# Patient Record
Sex: MALE | Race: BLACK OR AFRICAN AMERICAN | Employment: UNEMPLOYED | URBAN - METROPOLITAN AREA
[De-identification: names, ages, dates, MRNs, and addresses within clinical notes are randomized per-mention and may not be internally consistent; named-entity substitution may affect disease eponyms.]

---

## 2017-01-07 DIAGNOSIS — G35 MULTIPLE SCLEROSIS (HCC): ICD-10-CM

## 2017-01-08 RX ORDER — TADALAFIL 10 MG
TABLET ORAL
Qty: 141 TAB | Refills: 0 | Status: SHIPPED | OUTPATIENT
Start: 2017-01-08 | End: 2017-07-28 | Stop reason: DRUGHIGH

## 2017-04-13 DIAGNOSIS — K59.2 CONSTIPATION DUE TO NEUROGENIC BOWEL: ICD-10-CM

## 2017-04-13 DIAGNOSIS — N31.8 SPASTIC NEUROGENIC BLADDERS: ICD-10-CM

## 2017-04-13 DIAGNOSIS — G25.0 BENIGN ESSENTIAL TREMOR: ICD-10-CM

## 2017-04-13 DIAGNOSIS — G35 MULTIPLE SCLEROSIS (HCC): ICD-10-CM

## 2017-04-13 DIAGNOSIS — E55.9 VITAMIN D DEFICIENCY: ICD-10-CM

## 2017-04-13 DIAGNOSIS — K59.00 CONSTIPATION DUE TO NEUROGENIC BOWEL: ICD-10-CM

## 2017-04-13 DIAGNOSIS — N52.1 ERECTILE DYSFUNCTION DUE TO DISEASES CLASSIFIED ELSEWHERE: ICD-10-CM

## 2017-04-14 RX ORDER — TOLTERODINE 4 MG/1
CAPSULE, EXTENDED RELEASE ORAL
Qty: 100 CAP | Refills: 0 | Status: SHIPPED | OUTPATIENT
Start: 2017-04-14 | End: 2017-07-28 | Stop reason: SDUPTHER

## 2017-05-31 DIAGNOSIS — G35 MS (MULTIPLE SCLEROSIS) (HCC): ICD-10-CM

## 2017-05-31 RX ORDER — INTERFERON BETA-1A 30MCG/.5ML
KIT INTRAMUSCULAR
Qty: 3 KIT | Refills: 5 | Status: SHIPPED | OUTPATIENT
Start: 2017-05-31 | End: 2018-06-07 | Stop reason: SDUPTHER

## 2017-05-31 NOTE — TELEPHONE ENCOUNTER
Future Appointments  Date Time Provider Jimmy Tabaresi   7/28/2017 2:00 PM Markos Carias MD [address]                         Last Appointment My Department:  4/7/2016    Please advise of refill below.   Requested Prescriptions     Pending Prescriptions Disp Refills    AVONEX 30 mcg/0.5 mL pnkt [Pharmacy Med Name: Luisana Acuña (4/BOX) 30MCG/.5M] 3 Kit 5     Sig: INJECT 30 MCG INTRAMUSCULARLY ONCE A WEEK

## 2017-07-28 ENCOUNTER — OFFICE VISIT (OUTPATIENT)
Dept: NEUROLOGY | Age: 52
End: 2017-07-28

## 2017-07-28 VITALS
DIASTOLIC BLOOD PRESSURE: 76 MMHG | OXYGEN SATURATION: 98 % | HEART RATE: 68 BPM | WEIGHT: 197 LBS | HEIGHT: 64 IN | SYSTOLIC BLOOD PRESSURE: 116 MMHG | BODY MASS INDEX: 33.63 KG/M2 | RESPIRATION RATE: 12 BRPM

## 2017-07-28 DIAGNOSIS — G35 MULTIPLE SCLEROSIS (HCC): ICD-10-CM

## 2017-07-28 DIAGNOSIS — K59.00 CONSTIPATION DUE TO NEUROGENIC BOWEL: ICD-10-CM

## 2017-07-28 DIAGNOSIS — N52.1 ERECTILE DYSFUNCTION DUE TO DISEASES CLASSIFIED ELSEWHERE: ICD-10-CM

## 2017-07-28 DIAGNOSIS — N31.8 SPASTIC NEUROGENIC BLADDERS: ICD-10-CM

## 2017-07-28 DIAGNOSIS — K59.2 CONSTIPATION DUE TO NEUROGENIC BOWEL: ICD-10-CM

## 2017-07-28 DIAGNOSIS — G25.0 BENIGN ESSENTIAL TREMOR: ICD-10-CM

## 2017-07-28 DIAGNOSIS — M54.16 LUMBAR BACK PAIN WITH RADICULOPATHY AFFECTING RIGHT LOWER EXTREMITY: Primary | ICD-10-CM

## 2017-07-28 DIAGNOSIS — E55.9 VITAMIN D DEFICIENCY: ICD-10-CM

## 2017-07-28 RX ORDER — MELATONIN 5 MG
5 CAPSULE ORAL
COMMUNITY
End: 2018-10-01 | Stop reason: CLARIF

## 2017-07-28 RX ORDER — TADALAFIL 20 MG/1
20 TABLET ORAL AS NEEDED
Qty: 6 TAB | Refills: 11 | Status: SHIPPED | OUTPATIENT
Start: 2017-07-28 | End: 2019-04-23 | Stop reason: SDUPTHER

## 2017-07-28 RX ORDER — PREDNISONE 10 MG/1
TABLET ORAL
Refills: 0 | COMMUNITY
Start: 2017-07-24 | End: 2018-10-01 | Stop reason: CLARIF

## 2017-07-28 RX ORDER — TOLTERODINE 4 MG/1
CAPSULE, EXTENDED RELEASE ORAL
Qty: 100 CAP | Refills: 5 | Status: SHIPPED | OUTPATIENT
Start: 2017-07-28 | End: 2018-10-01 | Stop reason: CLARIF

## 2017-07-28 NOTE — PATIENT INSTRUCTIONS

## 2017-07-28 NOTE — LETTER
7/28/2017 9:55 PM 
 
Patient:  Anusha Ramos YOB: 1965 Date of Visit: 7/28/2017 Dear No Recipients: Thank you for referring Mr. Anusha Ramos to me for evaluation/treatment. Below are the relevant portions of my assessment and plan of care. Consult Subjective:  
 
Anusha Ramos is a 45 y.o. Right-handed Afro-American male seen for evaluation of a new problem of sudden severe pain in his back going down his leg that required him to go to the emergency room for evaluation and for which she was given prednisone with some improvement, and we are seeing the patient at the request of Dr. Chung Deutsch. Is also been having more difficulty with erectile dysfunction and desires treatment since his current therapy of Cialis 10 mg is not working. We suggested he try 20 mg and he agrees to give that a try. Is not much else to do. He had no acute injury for his back pain, and no real sensory loss or new focal weakness in the leg and seems to be getting slowly better. He was referred to an orthopedic with setting up physical therapy for him in addition. We will not proceed with further evaluation other than we gave him some back exercises to do today. He has problem with neurogenic bladder, but the Detrol 4 mg LA  we gave him for frequent urination during the daytime has been extremely helpful for occasional incontinence due to urinary urgency. He denies any infection or burning. He doesn't have nocturia more than once or twice a night. The main problem is the daytime. He has not had any major acute exacerbations of his MS, and his gait remains stable without new weakness or sensory loss or visual changes. He has no difficulty with his bowel movements. He suggested he see a urologist but he insists on trying medication first, so we will place him on detrol LA 4 mg every morning.  If he does not improve he needs to see a urologist. He has a slight tremor in his hands when he tries to hold things or grab things. It does not occur at rest and is not associated with gait abnormalities. He has no family history of similar problems. He is also seen because of his MS. He had optic neuritis in the right eye one year ago. His vision has improved and the eye pain is gone. His MRI scan showed a lesion in his right optic nerve that was acute, with stable slightly progressive white matter disease typical of his MS. He had no other new neurological symptoms. He does have erectile dysfunction, and had been given shot therapy by a urologist. He is on Avonex therapy once a week. He denies any side effect from the injections, other than a rare flulike episode. This is his first exacerbation in many years. He has had no other new focal weakness, visual problems, sensory loss, cognitive issues, gait problems, fevers or trauma or other new neurological symptoms. A complete review of systems and symptoms he's had no other new medical problems, complications or illnesses except for the erectile dysfunction and MS and tremor. Past Medical History:  
Diagnosis Date  Multiple sclerosis ([facility] [address] History reviewed. No pertinent surgical history. Family History Problem Relation Age of Onset  Hypertension Mother  Heart Disease Mother  High Cholesterol Mother  Arthritis-osteo Father  Hypertension Brother  Cancer Brother   
  lung  Hypertension Brother  Cancer Brother   
  prostate  Hypertension Sister  Other Sister   
  cerebral aneurysm Social History Substance Use Topics  Smoking status: Never Smoker  Smokeless tobacco: Never Used  Alcohol use No  
   
Current Outpatient Prescriptions Medication Sig Dispense Refill  predniSONE (DELTASONE) 10 mg tablet take 3 tablets by mouth daily for 2 days then take 2 tablets for . ..  (REFER TO PRESCRIPTION NOTES).   0  
 tolterodine ER (DETROL LA) 4 mg ER capsule TAKE ONE CAPSULE BY MOUTH ONCE DAILY 100 Cap 5  
  melatonin 5 mg cap capsule Take 5 mg by mouth nightly.  tadalafil (CIALIS) 20 mg tablet Take 1 Tab by mouth as needed. 6 Tab 11  
 AVONEX 30 mcg/0.5 mL pnkt INJECT 30 MCG INTRAMUSCULARLY ONCE A WEEK 3 Kit 5  cetirizine (ZYRTEC) 10 mg tablet Take 10 mg by mouth daily.  naproxen sodium (ALEVE) 220 mg tablet Take 220 mg by mouth as directed. Take 30 minutes before Avonex injections.  multivitamins-minerals-lutein (CENTRUM SILVER) Tab Take  by mouth.  Cholecalciferol, Vitamin D3, (VITAMIN D3) 1,000 unit cap Take  by mouth. No Known Allergies Review of Systems: A comprehensive review of systems was negative except for: Eyes: positive for visual disturbance Genitourinary: positive for erectile dysfunction Musculoskeletal: positive for myalgias, arthralgias and stiff joints Neurological: positive for tremor and visual loss Vitals:  
 07/28/17 1355 BP: 116/76 Pulse: 68 Resp: 12 SpO2: 98% Weight: 197 lb (89.4 kg) Height: 5' 4\" (1.626 m) Objective: I 
 
 
NEUROLOGICAL EXAM: 
 
Appearance: The patient is well developed, well nourished, provides a coherent history and is in no acute distress. Mental Status: Oriented to time, place and person, and the president. Mood and affect appropriate. Cranial Nerves:   Intact visual fields. Fundi show bilateral optic pallor, right side greater than left , with mild optic atrophy seen bilaterally. Visual acuity is difficult because the patient has marked presbyopia. Jeyson GRAJEDA, EOM's full, no nystagmus, no ptosis. Facial sensation is normal. Corneal reflexes are not tested. Facial movement is symmetric. Hearing is normal bilaterally. Palate is midline with normal sternocleidomastoid and trapezius muscles are normal. Tongue is midline. Neck is supple without meningismus or bruits. Temporal arteries are not tender or enlarged Motor:  5/5 strength in upper and lower proximal and distal muscles. Normal bulk and increased tone. No fasciculations. Reflexes:   Deep tendon reflexes 2+/4 and symmetrical. 
No babinski or clonus present Straight leg raising test is negative in the sitting position bilaterally He has some mild percussion tenderness over the lower lumbar spine Sensory:   Normal to touch, pinprick and vibration and temperature . Gait:  Normal gait, the patient has slight stiffness in his legs and slightly clumsy. Tremor:   Mild bilateral intention tremor noted. Cerebellar:  No cerebellar signs present. Neurovascular:  Normal heart sounds and regular rhythm, peripheral pulses intact, and no carotid bruits. Assessment: ICD-10-CM ICD-9-CM 1. Lumbar back pain with radiculopathy affecting right lower extremity M54.17 724.4 2. Multiple sclerosis (HCC) G35 340 predniSONE (DELTASONE) 10 mg tablet  
   tolterodine ER (DETROL LA) 4 mg ER capsule  
   tadalafil (CIALIS) 20 mg tablet CBC WITH AUTOMATED DIFF  
   METABOLIC PANEL, COMPREHENSIVE 3. Benign essential tremor G25.0 333.1 predniSONE (DELTASONE) 10 mg tablet  
   tolterodine ER (DETROL LA) 4 mg ER capsule  
   tadalafil (CIALIS) 20 mg tablet CBC WITH AUTOMATED DIFF  
   METABOLIC PANEL, COMPREHENSIVE 4. 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  
   tolterodine ER (DETROL LA) 4 mg ER capsule  
   tadalafil (CIALIS) 20 mg tablet CBC WITH AUTOMATED DIFF  
   METABOLIC PANEL, COMPREHENSIVE 5. Constipation due to neurogenic bowel K59.00 564.09 predniSONE (DELTASONE) 10 mg tablet  
   tolterodine ER (DETROL LA) 4 mg ER capsule  
   tadalafil (CIALIS) 20 mg tablet CBC WITH AUTOMATED DIFF  
   METABOLIC PANEL, COMPREHENSIVE 6. Erectile dysfunction due to diseases classified elsewhere N52.1 607.84 predniSONE (DELTASONE) 10 mg tablet  
   tolterodine ER (DETROL LA) 4 mg ER capsule  
   tadalafil (CIALIS) 20 mg tablet    CBC WITH AUTOMATED DIFF  
 METABOLIC PANEL, COMPREHENSIVE 7. Vitamin D deficiency E55.9 268.9 predniSONE (DELTASONE) 10 mg tablet  
   tolterodine ER (DETROL LA) 4 mg ER capsule  
   tadalafil (CIALIS) 20 mg tablet CBC WITH AUTOMATED DIFF  
   METABOLIC PANEL, COMPREHENSIVE Plan:  
 
Patient with sudden sciatica and probable right leg radiculopathy, better after steroids and going to see an orthopedist to set up physical therapy. Patient with refractory erectile dysfunction, will try to increase his sialoliths as tolerated. His neurogenic bladder is better on the Detrol, he will continue current therapy and again we encouraged him to see a urologist if his symptoms do not improve. For his MS he is to continue his Avonex, and metabolic parameters will be checked today and his medications were renewed. Patient has neurogenic bladder secondary to his MS, most likely a spastic bladder with urge incontinence We'll treat with Detrol LA every morning, and if he does not get better he needs to see a urologist 
Routine metabolic parameters also checked to rule out side effects of his therapy on Avonex Patient's essential tremor doesn't seem significant amount to treat yet, we will follow him he will call if any problems He is to continue his Avonex therapy for his MS and call us if there is any problem We discussed other disease modifying drugs in view of his mild progression on his MRI scan, but the patient wants to continue his current therapy He is encouraged to make sure that he take a multivitamin vitamin D on a regular basis, and remained medically and physically active. Followup in 6 months time or earlier if needed Signed By: Amanda Rosenberg MD   
 July 28, 2017 This note will not be viewable in [address] If you have questions, please do not hesitate to call me. I look forward to following Mr. Cruz Dallas along with you. Sincerely, Amanda Rosenberg MD

## 2017-07-28 NOTE — MR AVS SNAPSHOT
Visit Information Date & Time Provider Department Dept. Phone Encounter #  
 7/28/2017  2:00 PM Zaid Syed MD [facility] [phone] [id number] Follow-up Instructions Return in about 6 months (around 1/28/2018). Upcoming Health Maintenance Date Due Hepatitis C Screening 1965 DTaP/Tdap/Td series (1 - Tdap) 3/19/1984 FOBT Q 1 YEAR AGE 50-75 3/19/2013 INFLUENZA AGE 9 TO ADULT 8/1/2017 Allergies as of 7/28/2017  Review Complete On: 7/28/2017 By: Zaid Syed MD  
 No Known Allergies Current Immunizations  Never Reviewed No immunizations on file. Not reviewed this visit You Were Diagnosed With   
  
 Codes Comments [facility]    -  Primary ICD-10-CM: G35 
ICD-9-CM: 253 Benign essential tremor     ICD-10-CM: G25.0 ICD-9-CM: 333.1 Spastic neurogenic bladders     ICD-10-CM: N31.9 ICD-9-CM: 596.54 Constipation due to neurogenic bowel     ICD-10-CM: K59.00 ICD-9-CM: 564.09 Erectile dysfunction due to diseases classified elsewhere     ICD-10-CM: N52.1 ICD-9-CM: 607.84 Vitamin D deficiency     ICD-10-CM: E55.9 ICD-9-CM: 268.9 Vitals BP Pulse Resp Height(growth percentile) Weight(growth percentile) SpO2  
 116/76 68 12 5' 4\" (1.626 m) 197 lb (89.4 kg) 98% BMI Smoking Status 33.81 kg/m2 Never Smoker Vitals History BMI and BSA Data Body Mass Index Body Surface Area  
 33.81 kg/m 2 2.01 m 2 Preferred Pharmacy Pharmacy Name Phone [facility], [facility] [phone] Your Updated Medication List  
  
   
This list is accurate as of: 7/28/17  2:15 PM.  Always use your most recent med list.  
  
  
  
  
 ALEVE 220 mg tablet Generic drug:  naproxen sodium Take 220 mg by mouth as directed. Take 30 minutes before Avonex injections. AVONEX 30 mcg/0.5 mL Pnkt Generic drug:  interferon beta-1a INJECT 30 MCG INTRAMUSCULARLY ONCE A WEEK CENTRUM SILVER Tab tablet Generic drug:  multivitamins-minerals-lutein Take  by mouth. cetirizine 10 mg tablet Commonly known as:  ZYRTEC Take 10 mg by mouth daily. melatonin 5 mg Cap capsule Take 5 mg by mouth nightly. predniSONE 10 mg tablet Commonly known as:  DELTASONE  
take 3 tablets by mouth daily for 2 days then take 2 tablets for . ..  (REFER TO PRESCRIPTION NOTES). tadalafil 20 mg tablet Commonly known as:  CIALIS Take 1 Tab by mouth as needed. tolterodine ER 4 mg ER capsule Commonly known as:  DETROL LA  
TAKE ONE CAPSULE BY MOUTH ONCE DAILY  
  
 VITAMIN D3 1,000 unit Cap Generic drug:  cholecalciferol Take  by mouth. Prescriptions Sent to Pharmacy Refills  
 tolterodine ER (DETROL LA) 4 mg ER capsule 5 Sig: TAKE ONE CAPSULE BY MOUTH ONCE DAILY Class: Normal  
 Pharmacy: [place] [id number] Ph #: [phone]  
 tadalafil (CIALIS) 20 mg tablet 11 Sig: Take 1 Tab by mouth as needed. Class: Normal  
 Pharmacy: [id number] [place] [address] Ph #: [phone] Route: Oral  
  
We Performed the Following CBC WITH AUTOMATED DIFF [26361 CPT(R)] METABOLIC PANEL, COMPREHENSIVE [40537 CPT(R)] Follow-up Instructions Return in about 6 months (around 1/28/2018). Patient Instructions A Healthy Lifestyle: Care Instructions Your Care Instructions A healthy lifestyle can help you feel good, stay at a healthy weight, and have plenty of energy for both work and play. A healthy lifestyle is something you can share with your whole family. A healthy lifestyle also can lower your risk for serious health problems, such as high blood pressure, heart disease, and diabetes.  
You can follow a few steps listed below to improve your health and the health of your family. Follow-up care is a key part of your treatment and safety. Be sure to make and go to all appointments, and call your doctor if you are having problems. Its also a good idea to know your test results and keep a list of the medicines you take. How can you care for yourself at home? · Do not eat too much sugar, fat, or fast foods. You can still have dessert and treats now and then. The goal is moderation. · Start small to improve your eating habits. Pay attention to portion sizes, drink less juice and soda pop, and eat more fruits and vegetables. ¨ Eat a healthy amount of food. A 3-ounce serving of meat, for example, is about the size of a deck of cards. Fill the rest of your plate with vegetables and whole grains. ¨ Limit the amount of soda and sports drinks you have every day. Drink more water when you are thirsty. ¨ Eat at least 5 servings of fruits and vegetables every day. It may seem like a lot, but it is not hard to reach this goal. A serving or helping is 1 piece of fruit, 1 cup of vegetables, or 2 cups of leafy, raw vegetables. Have an apple or some carrot sticks as an afternoon snack instead of a candy bar. Try to have fruits and/or vegetables at every meal. 
· Make exercise part of your daily routine. You may want to start with simple activities, such as walking, bicycling, or slow swimming. Try to be active 30 to 60 minutes every day. You do not need to do all 30 to 60 minutes all at once. For example, you can exercise 3 times a day for 10 or 20 minutes. Moderate exercise is safe for most people, but it is always a good idea to talk to your doctor before starting an exercise program. 
· Keep moving. Joceline Bi the lawn, work in the garden, or [employer]. Take the stairs instead of the elevator at work. · If you smoke, quit. People who smoke have an increased risk for heart attack, stroke, cancer, and other lung illnesses.  Quitting is hard, but there are ways to boost your chance of quitting tobacco for good. ¨ Use nicotine gum, patches, or lozenges. ¨ Ask your doctor about stop-smoking programs and medicines. ¨ Keep trying. In addition to reducing your risk of diseases in the future, you will notice some benefits soon after you stop using tobacco. If you have shortness of breath or asthma symptoms, they will likely get better within a few weeks after you quit. · Limit how much alcohol you drink. Moderate amounts of alcohol (up to 2 drinks a day for men, 1 drink a day for women) are okay. But drinking too much can lead to liver problems, high blood pressure, and other health problems. Family health If you have a family, there are many things you can do together to improve your health. · Eat meals together as a family as often as possible. · Eat healthy foods. This includes fruits, vegetables, lean meats and dairy, and whole grains. · Include your family in your fitness plan. Most people think of activities such as jogging or tennis as the way to fitness, but there are many ways you and your family can be more active. Anything that makes you breathe hard and gets your heart pumping is exercise. Here are some tips: 
¨ Walk to do errands or to take your child to school or the bus. ¨ Go for a family bike ride after dinner instead of watching TV. Where can you learn more? Go to http://puneet-estephania.info/. Enter H124 in the search box to learn more about \"A Healthy Lifestyle: Care Instructions. \" Current as of: July 26, 2016 Content Version: 11.3 © 2678-0972 [employer] Care instructions adapted under license by [employer] (which disclaims liability or warranty for this information). If you have questions about a medical condition or this instruction, always ask your healthcare professional. [place] [address] any warranty or liability for your use of this information. Introducing [address]! [facility] introduces [employer] patient portal. Now you can access parts of your medical record, email your doctor's office, and request medication refills online. 1. In your internet browser, go to [email or web address] 2. Click on the First Time User? Click Here link in the Sign In box. You will see the New Member Sign Up page. 3. Enter your Online Prasad Access Code exactly as it appears below. You will not need to use this code after youve completed the sign-up process. If you do not sign up before the expiration date, you must request a new code. · Online Prasad Access Code: [email or web address] Expires: 10/26/2017  1:57 PM 
 
4. Enter the last four digits of your Social Security Number (xxxx) and Date of Birth (mm/dd/yyyy) as indicated and click Submit. You will be taken to the next sign-up page. 5. Create a Online Prasad ID. This will be your Online Prasad login ID and cannot be changed, so think of one that is secure and easy to remember. 6. Create a Online Prasad password. You can change your password at any time. 7. Enter your Password Reset Question and Answer. This can be used at a later time if you forget your password. 8. Enter your e-mail address. You will receive e-mail notification when new information is available in [address] 9. Click Sign Up. You can now view and download portions of your medical record. 10. Click the Download Summary menu link to download a portable copy of your medical information. If you have questions, please visit the Frequently Asked Questions section of the Online Prasad website. Remember, Online Prasad is NOT to be used for urgent needs. For medical emergencies, dial 911. Now available from your iPhone and Android! Please provide this summary of care documentation to your next provider. Your primary care clinician is listed as Yash Shetty. If you have any questions after today's visit, please call [phone].

## 2017-07-29 LAB
ALBUMIN SERPL-MCNC: 3.7 G/DL (ref 3.5–5.5)
ALBUMIN/GLOB SERPL: 1.3 {RATIO} (ref 1.2–2.2)
ALP SERPL-CCNC: 63 IU/L (ref 39–117)
ALT SERPL-CCNC: 20 IU/L (ref 0–44)
AST SERPL-CCNC: 20 IU/L (ref 0–40)
BASOPHILS # BLD AUTO: 0 X10E3/UL (ref 0–0.2)
BASOPHILS NFR BLD AUTO: 0 %
BILIRUB SERPL-MCNC: 0.3 MG/DL (ref 0–1.2)
BUN SERPL-MCNC: 19 MG/DL (ref 6–24)
BUN/CREAT SERPL: 20 (ref 9–20)
CALCIUM SERPL-MCNC: 9.3 MG/DL (ref 8.7–10.2)
CHLORIDE SERPL-SCNC: 102 MMOL/L (ref 96–106)
CO2 SERPL-SCNC: 24 MMOL/L (ref 18–29)
CREAT SERPL-MCNC: 0.97 MG/DL (ref 0.76–1.27)
EOSINOPHIL # BLD AUTO: 0 X10E3/UL (ref 0–0.4)
EOSINOPHIL NFR BLD AUTO: 0 %
ERYTHROCYTE [DISTWIDTH] IN BLOOD BY AUTOMATED COUNT: 13.5 % (ref 12.3–15.4)
GLOBULIN SER CALC-MCNC: 2.8 G/DL (ref 1.5–4.5)
GLUCOSE SERPL-MCNC: 82 MG/DL (ref 65–99)
HCT VFR BLD AUTO: 40.9 % (ref 37.5–51)
HGB BLD-MCNC: 13.8 G/DL (ref 12.6–17.7)
IMM GRANULOCYTES # BLD: 0 X10E3/UL (ref 0–0.1)
IMM GRANULOCYTES NFR BLD: 0 %
LYMPHOCYTES # BLD AUTO: 3.2 X10E3/UL (ref 0.7–3.1)
LYMPHOCYTES NFR BLD AUTO: 21 %
MCH RBC QN AUTO: 30.7 PG (ref 26.6–33)
MCHC RBC AUTO-ENTMCNC: 33.7 G/DL (ref 31.5–35.7)
MCV RBC AUTO: 91 FL (ref 79–97)
MONOCYTES # BLD AUTO: 1.8 X10E3/UL (ref 0.1–0.9)
MONOCYTES NFR BLD AUTO: 12 %
NEUTROPHILS # BLD AUTO: 10.6 X10E3/UL (ref 1.4–7)
NEUTROPHILS NFR BLD AUTO: 67 %
PLATELET # BLD AUTO: 301 X10E3/UL (ref 150–379)
POTASSIUM SERPL-SCNC: 3.8 MMOL/L (ref 3.5–5.2)
PROT SERPL-MCNC: 6.5 G/DL (ref 6–8.5)
RBC # BLD AUTO: 4.49 X10E6/UL (ref 4.14–5.8)
SODIUM SERPL-SCNC: 142 MMOL/L (ref 134–144)
WBC # BLD AUTO: 15.6 X10E3/UL (ref 3.4–10.8)

## 2017-07-29 NOTE — PROGRESS NOTES
Consult    Subjective:     Bao Rios is a 45 y.o. Right-handed Afro-American male seen for evaluation of a new problem of sudden severe pain in his back going down his leg that required him to go to the emergency room for evaluation and for which she was given prednisone with some improvement, and we are seeing the patient at the request of Dr. Burt Shelley. Is also been having more difficulty with erectile dysfunction and desires treatment since his current therapy of Cialis 10 mg is not working. We suggested he try 20 mg and he agrees to give that a try. Is not much else to do. He had no acute injury for his back pain, and no real sensory loss or new focal weakness in the leg and seems to be getting slowly better. He was referred to an orthopedic with setting up physical therapy for him in addition. We will not proceed with further evaluation other than we gave him some back exercises to do today. He has problem with neurogenic bladder, but the Detrol 4 mg LA  we gave him for frequent urination during the daytime has been extremely helpful for occasional incontinence due to urinary urgency. He denies any infection or burning. He doesn't have nocturia more than once or twice a night. The main problem is the daytime. He has not had any major acute exacerbations of his MS, and his gait remains stable without new weakness or sensory loss or visual changes. He has no difficulty with his bowel movements. He suggested he see a urologist but he insists on trying medication first, so we will place him on detrol LA 4 mg every morning. If he does not improve he needs to see a urologist. He has a slight tremor in his hands when he tries to hold things or grab things. It does not occur at rest and is not associated with gait abnormalities. He has no family history of similar problems. He is also seen because of his MS. He had optic neuritis in the right eye one year ago. His vision has improved and the eye pain is gone. His MRI scan showed a lesion in his right optic nerve that was acute, with stable slightly progressive white matter disease typical of his MS. He had no other new neurological symptoms. He does have erectile dysfunction, and had been given shot therapy by a urologist. He is on Avonex therapy once a week. He denies any side effect from the injections, other than a rare flulike episode. This is his first exacerbation in many years. He has had no other new focal weakness, visual problems, sensory loss, cognitive issues, gait problems, fevers or trauma or other new neurological symptoms. A complete review of systems and symptoms he's had no other new medical problems, complications or illnesses except for the erectile dysfunction and MS and tremor. Past Medical History:   Diagnosis Date    Multiple sclerosis ([facility] [address]       History reviewed. No pertinent surgical history. Family History   Problem Relation Age of Onset    Hypertension Mother     Heart Disease Mother     High Cholesterol Mother    [employer] Arthritis-osteo Father     Hypertension Brother     Cancer Brother      lung    Hypertension Brother     Cancer Brother      prostate    Hypertension Sister     Other Sister      cerebral aneurysm      Social History   Substance Use Topics    Smoking status: Never Smoker    Smokeless tobacco: Never Used    Alcohol use No       Current Outpatient Prescriptions   Medication Sig Dispense Refill    predniSONE (DELTASONE) 10 mg tablet take 3 tablets by mouth daily for 2 days then take 2 tablets for . ..  (REFER TO PRESCRIPTION NOTES). 0    tolterodine ER (DETROL LA) 4 mg ER capsule TAKE ONE CAPSULE BY MOUTH ONCE DAILY 100 Cap 5    melatonin 5 mg cap capsule Take 5 mg by mouth nightly.  tadalafil (CIALIS) 20 mg tablet Take 1 Tab by mouth as needed. 6 Tab 11    AVONEX 30 mcg/0.5 mL pnkt INJECT 30 MCG INTRAMUSCULARLY ONCE A WEEK 3 Kit 5    cetirizine (ZYRTEC) 10 mg tablet Take 10 mg by mouth daily.       naproxen sodium (ALEVE) 220 mg tablet Take 220 mg by mouth as directed. Take 30 minutes before Avonex injections.  multivitamins-minerals-lutein (CENTRUM SILVER) Tab Take  by mouth.  Cholecalciferol, Vitamin D3, (VITAMIN D3) 1,000 unit cap Take  by mouth. No Known Allergies     Review of Systems:  A comprehensive review of systems was negative except for: Eyes: positive for visual disturbance  Genitourinary: positive for erectile dysfunction  Musculoskeletal: positive for myalgias, arthralgias and stiff joints  Neurological: positive for tremor and visual loss   Vitals:    07/28/17 1355   BP: 116/76   Pulse: 68   Resp: 12   SpO2: 98%   Weight: 197 lb (89.4 kg)   Height: 5' 4\" (1.626 m)     Objective:     I      NEUROLOGICAL EXAM:    Appearance: The patient is well developed, well nourished, provides a coherent history and is in no acute distress. Mental Status: Oriented to time, place and person, and the president. Mood and affect appropriate. Cranial Nerves:   Intact visual fields. Fundi show bilateral optic pallor, right side greater than left , with mild optic atrophy seen bilaterally. Visual acuity is difficult because the patient has marked presbyopia. Kena Peaks TARAS, EOM's full, no nystagmus, no ptosis. Facial sensation is normal. Corneal reflexes are not tested. Facial movement is symmetric. Hearing is normal bilaterally. Palate is midline with normal sternocleidomastoid and trapezius muscles are normal. Tongue is midline. Neck is supple without meningismus or bruits. Temporal arteries are not tender or enlarged   Motor:  5/5 strength in upper and lower proximal and distal muscles. Normal bulk and increased tone. No fasciculations.    Reflexes:   Deep tendon reflexes 2+/4 and symmetrical.  No babinski or clonus present  Straight leg raising test is negative in the sitting position bilaterally  He has some mild percussion tenderness over the lower lumbar spine   Sensory:   Normal to touch, pinprick and vibration and temperature . Gait:  Normal gait, the patient has slight stiffness in his legs and slightly clumsy. Tremor:   Mild bilateral intention tremor noted. Cerebellar:  No cerebellar signs present. Neurovascular:  Normal heart sounds and regular rhythm, peripheral pulses intact, and no carotid bruits. Assessment:       ICD-10-CM ICD-9-CM    1. Lumbar back pain with radiculopathy affecting right lower extremity M54.17 724.4    2. Multiple sclerosis (HCC) G35 340 predniSONE (DELTASONE) 10 mg tablet      tolterodine ER (DETROL LA) 4 mg ER capsule      tadalafil (CIALIS) 20 mg tablet      CBC WITH AUTOMATED DIFF      METABOLIC PANEL, COMPREHENSIVE   3. Benign essential tremor G25.0 333.1 predniSONE (DELTASONE) 10 mg tablet      tolterodine ER (DETROL LA) 4 mg ER capsule      tadalafil (CIALIS) 20 mg tablet      CBC WITH AUTOMATED DIFF      METABOLIC PANEL, COMPREHENSIVE   4. Spastic neurogenic bladders N31.9 596.54 predniSONE (DELTASONE) 10 mg tablet      tolterodine ER (DETROL LA) 4 mg ER capsule      tadalafil (CIALIS) 20 mg tablet      CBC WITH AUTOMATED DIFF      METABOLIC PANEL, COMPREHENSIVE   5. Constipation due to neurogenic bowel K59.00 564.09 predniSONE (DELTASONE) 10 mg tablet      tolterodine ER (DETROL LA) 4 mg ER capsule      tadalafil (CIALIS) 20 mg tablet      CBC WITH AUTOMATED DIFF      METABOLIC PANEL, COMPREHENSIVE   6. Erectile dysfunction due to diseases classified elsewhere N52.1 607.84 predniSONE (DELTASONE) 10 mg tablet      tolterodine ER (DETROL LA) 4 mg ER capsule      tadalafil (CIALIS) 20 mg tablet      CBC WITH AUTOMATED DIFF      METABOLIC PANEL, COMPREHENSIVE   7.  Vitamin D deficiency E55.9 268.9 predniSONE (DELTASONE) 10 mg tablet      tolterodine ER (DETROL LA) 4 mg ER capsule      tadalafil (CIALIS) 20 mg tablet      CBC WITH AUTOMATED DIFF      METABOLIC PANEL, COMPREHENSIVE         Plan:     Patient with sudden sciatica and probable right leg radiculopathy, better after steroids and going to see an orthopedist to set up physical therapy. Patient with refractory erectile dysfunction, will try to increase his sialoliths as tolerated. His neurogenic bladder is better on the Detrol, he will continue current therapy and again we encouraged him to see a urologist if his symptoms do not improve. For his MS he is to continue his Avonex, and metabolic parameters will be checked today and his medications were renewed. Patient has neurogenic bladder secondary to his MS, most likely a spastic bladder with urge incontinence  We'll treat with Detrol LA every morning, and if he does not get better he needs to see a urologist  Routine metabolic parameters also checked to rule out side effects of his therapy on Avonex  Patient's essential tremor doesn't seem significant amount to treat yet, we will follow him he will call if any problems  He is to continue his Avonex therapy for his MS and call us if there is any problem  We discussed other disease modifying drugs in view of his mild progression on his MRI scan, but the patient wants to continue his current therapy  He is encouraged to make sure that he take a multivitamin vitamin D on a regular basis, and remained medically and physically active. Followup in 6 months time or earlier if needed    Signed By: Cl García MD     July 28, 2017       This note will not be viewable in [address]

## 2018-04-16 RX ORDER — TADALAFIL 10 MG
TABLET ORAL
Qty: 12 TAB | Refills: 1 | Status: SHIPPED | OUTPATIENT
Start: 2018-04-16 | End: 2018-11-19 | Stop reason: ALTCHOICE

## 2018-04-16 NOTE — TELEPHONE ENCOUNTER
----- Message from Janell Bai sent at 4/16/2018  1:41 PM EDT -----  Regarding: Dr Darline Macias request  Pt needs a prescription for Cialis sent [facility] located [place], [place] (Phone number should be on file). [facility] faxed a request. Pt is out of the medication. Pt can be reached at [phone].

## 2018-05-10 ENCOUNTER — TELEPHONE (OUTPATIENT)
Dept: NEUROLOGY | Age: 53
End: 2018-05-10

## 2018-05-10 NOTE — TELEPHONE ENCOUNTER
Re: Avonex - rec'd approval from [employer] [facility] to 5/1/2020. Faxed auth to [employer] [facility].

## 2018-06-07 DIAGNOSIS — G35 MS (MULTIPLE SCLEROSIS) (HCC): ICD-10-CM

## 2018-06-07 RX ORDER — INTERFERON BETA-1A 30MCG/.5ML
KIT INTRAMUSCULAR
Qty: 3 KIT | Refills: 5 | Status: SHIPPED | OUTPATIENT
Start: 2018-06-07 | End: 2018-11-19 | Stop reason: CLARIF

## 2018-10-01 ENCOUNTER — OFFICE VISIT (OUTPATIENT)
Dept: NEUROLOGY | Age: 53
End: 2018-10-01

## 2018-10-01 VITALS
OXYGEN SATURATION: 95 % | HEIGHT: 64 IN | BODY MASS INDEX: 34.49 KG/M2 | SYSTOLIC BLOOD PRESSURE: 126 MMHG | DIASTOLIC BLOOD PRESSURE: 80 MMHG | HEART RATE: 62 BPM | WEIGHT: 202 LBS

## 2018-10-01 DIAGNOSIS — N31.8 SPASTIC NEUROGENIC BLADDERS: ICD-10-CM

## 2018-10-01 DIAGNOSIS — M54.16 LUMBAR BACK PAIN WITH RADICULOPATHY AFFECTING RIGHT LOWER EXTREMITY: ICD-10-CM

## 2018-10-01 DIAGNOSIS — G25.0 BENIGN ESSENTIAL TREMOR: ICD-10-CM

## 2018-10-01 DIAGNOSIS — N52.1 ERECTILE DYSFUNCTION DUE TO DISEASES CLASSIFIED ELSEWHERE: ICD-10-CM

## 2018-10-01 DIAGNOSIS — G35 MULTIPLE SCLEROSIS (HCC): Primary | ICD-10-CM

## 2018-10-01 NOTE — LETTER
10/1/2018 10:28 AM 
 
Patient:  Jose Alfredo Graham YOB: 1965 Date of Visit: 10/1/2018 Dear No Recipients: Thank you for referring Mr. Jose Alfredo Graham to me for evaluation/treatment. Below are the relevant portions of my assessment and plan of care. Consult Subjective:  
 
Jose Alfredo Graham is a 52 y.o. Right-handed Afro-American male seen for evaluation at the request of Dr. Erasto Mackay of a new problem of increasing unsteadiness in walking, being more clumsy, no longer able to move fast, and feeling that his right side may be a little bit worse as far as his weakness and numbness for the last several weeks. His last MRI scan was over 3 years ago, we will repeat that, he did have some progression of nonenhancing white matter lesions indicating perhaps some progression of disease. Patient does have multiple sclerosis and is currently on Avonex once a week. His medications were just refilled. His back pain is gotten much better with physical therapy and treatment with orthopedist that he no longer has a problem with that. He has problem with neurogenic bladder, but the Detrol 4 mg LA  we gave him for frequent urination during the daytime helped initially but no longer seems to help him so he stopped the medication. He does not take Cialis for his erectile dysfunction anymore either. He has no difficulty with his bowel movements, but does have to get up to void 2 times a night. He has a slight tremor in his hands when he tries to hold things or grab things. It does not occur at rest and is not associated with gait abnormalities. He has no family history of similar problems. He is also seen because of his MS. He had optic neuritis in the right eye one year ago. His vision has improved and the eye pain is gone.  His MRI scan showed a lesion in his right optic nerve that was acute, with stable slightly progressive white matter disease typical of his MS. He had no other new neurological symptoms. He does have erectile dysfunction, and had been given shot therapy by a urologist. He is on Avonex therapy once a week. He denies any side effect from the injections, other than a rare flulike episode. This is his first exacerbation in many years. He has had no other new focal weakness, visual problems, sensory loss, cognitive issues, gait problems, fevers or trauma or other new neurological symptoms. A complete review of systems and symptoms he's had no other new medical problems, complications or illnesses except for the erectile dysfunction and MS and tremor. Past Medical History:  
Diagnosis Date  Multiple sclerosis ([facility] [address] History reviewed. No pertinent surgical history. Family History Problem Relation Age of Onset  Hypertension Mother  Heart Disease Mother  High Cholesterol Mother  Arthritis-osteo Father  Hypertension Brother  Cancer Brother   
  lung  Hypertension Brother  Cancer Brother   
  prostate  Hypertension Sister  Other Sister   
  cerebral aneurysm Social History Substance Use Topics  Smoking status: Never Smoker  Smokeless tobacco: Never Used  Alcohol use No  
   
Current Outpatient Prescriptions Medication Sig Dispense Refill  AVONEX 30 mcg/0.5 mL pnkt INJECT ONE PEN (30 MCG) INTRAMUSCULARLY ONCE WEEKLY. REFRIGERATE. PROTECT FROM LIGHT. ALLOW TO WARM TO ROOM TEMPERATURE PRIOR TO USE. 3 Kit 5  
 CIALIS 10 mg tablet take 1 tablet by mouth once daily as directed 12 Tab 1  
 tadalafil (CIALIS) 20 mg tablet Take 1 Tab by mouth as needed. 6 Tab 11  
 cetirizine (ZYRTEC) 10 mg tablet Take 10 mg by mouth daily.  naproxen sodium (ALEVE) 220 mg tablet Take 220 mg by mouth as directed. Take 30 minutes before Avonex injections.  multivitamins-minerals-lutein (CENTRUM SILVER) Tab Take  by mouth.  Cholecalciferol, Vitamin D3, (VITAMIN D3) 1,000 unit cap Take  by mouth. No Known Allergies Review of Systems: A comprehensive review of systems was negative except for: Eyes: positive for visual disturbance Genitourinary: positive for erectile dysfunction Musculoskeletal: positive for myalgias, arthralgias and stiff joints Neurological: positive for tremor and visual loss Vitals:  
 10/01/18 7475 BP: 126/80 Pulse: 62 SpO2: 95% Weight: 202 lb (91.6 kg) Height: 5' 4\" (1.626 m) Objective: I 
 
 
NEUROLOGICAL EXAM: 
 
Appearance: The patient is well developed, well nourished, provides a coherent history and is in no acute distress. Mental Status: Oriented to time, place and person, and the president. Mood and affect appropriate. Speech is fluent without aphasia or dysarthria Cranial Nerves:   Intact visual fields. Fundi show bilateral optic pallor, right side greater than left , with mild optic atrophy seen bilaterally. Visual acuity is difficult because the patient has marked presbyopia. Elvira Moustapha TARAS, EOM's full, no nystagmus, no ptosis. Facial sensation is normal. Corneal reflexes are not tested. Facial movement is symmetric. Hearing is normal bilaterally. Palate is midline with normal sternocleidomastoid and trapezius muscles are normal. Tongue is midline. Neck is supple without meningismus or bruits. Temporal arteries are not tender or enlarged Motor:  5/5 strength in upper and lower proximal and distal muscles, but the right lower extremity shows strength about 4/5, and he has decreased rapid alternating movement in his right leg, and a trace in his right hand with slight weakness in the right upper extremity. Normal bulk and increased tone. No fasciculations. Reflexes:   Deep tendon reflexes 2+/4 and symmetrical. 
No babinski or clonus present Straight leg raising test is negative in the sitting position bilaterally He has some mild percussion tenderness over the lower lumbar spine Sensory:   Normal to touch, pinprick and vibration and temperature . Gait:  Abnormal gait, the patient has slight stiffness in his legs and slightly clumsy in his walking, particular on the right side which has a spastic right hemiparesis, and he tends to stumble occasionally on the right leg. Tremor:   Mild bilateral intention tremor noted right greater than left. Cerebellar:   Mildly abnormal Romberg and tandem cerebellar signs present. Neurovascular:  Normal heart sounds and regular rhythm, peripheral pulses decreased, and no carotid bruits. Assessment: ICD-10-CM ICD-9-CM 1. Multiple sclerosis ([facility] [address] G35 340 MRI BRAIN W WO CONT  
   MRI CERV SPINE W WO CONT  
   CBC WITH AUTOMATED DIFF  
   METABOLIC PANEL, COMPREHENSIVE REFERRAL TO PHYSICAL THERAPY 2. Benign essential tremor G25.0 333.1 MRI BRAIN W WO CONT  
   MRI CERV SPINE W WO CONT  
   CBC WITH AUTOMATED DIFF  
   METABOLIC PANEL, COMPREHENSIVE REFERRAL TO PHYSICAL THERAPY 3. Lumbar back pain with radiculopathy affecting right lower extremity M54.16 724.4 MRI BRAIN W WO CONT  
   MRI CERV SPINE W WO CONT  
   CBC WITH AUTOMATED DIFF  
   METABOLIC PANEL, COMPREHENSIVE REFERRAL TO PHYSICAL THERAPY 4. Erectile dysfunction due to diseases classified elsewhere N52.1 607.84 MRI BRAIN W WO CONT  
   MRI CERV SPINE W WO CONT  
   CBC WITH AUTOMATED DIFF  
   METABOLIC PANEL, COMPREHENSIVE REFERRAL TO PHYSICAL THERAPY 5. Spastic neurogenic bladders N31.9 596.54 MRI BRAIN W WO CONT  
   MRI CERV SPINE W WO CONT  
   CBC WITH AUTOMATED DIFF  
   METABOLIC PANEL, COMPREHENSIVE REFERRAL TO PHYSICAL THERAPY Plan:  
 
Patient with new problem of progressive ataxia and clumsiness on the right side, may be having an exacerbation of his MS, he did check his MRI of the brain and cervical spine that has been over 3 years since his last MRIs, and his last MRIs done over 3-1/2 years ago did show increased nonenhancing disease activity in both the spine and the brain, and he may need to change his therapy from Avonex to another agent or disease modifying drug to try to stop his progression We also did complete metabolic studies to rule out other causes of his symptoms, and he will check my chart for results of his lab test and MRI We sent him to physical therapy for gait training and strengthening His medication of Avonex was already just renewed, so we did not renew that again Patient sciatica is much better with exercise program, at this time no further treatment needed. Patient with refractory erectile dysfunction, will try to increase his sialoliths as tolerated. His neurogenic bladder was better on the Detrol, but he discontinued it because it did not seem to help and does not want any new medication and does not want to see a urologist 
For his MS he is to continue his Avonex, and metabolic parameters will be checked today and his medications were renewed. Patient has neurogenic bladder secondary to his MS, most likely a spastic bladder with urge incontinence Routine metabolic parameters also checked to rule out side effects of his therapy on Avonex Patient's essential tremor doesn't seem significant amount to treat yet, we will follow him he will call if any problems He is to continue his Avonex therapy for his MS and call us if there is any problem We discussed other disease modifying drugs in view of his mild progression on his MRI scan, but the patient wants to continue his current therapy He is encouraged to make sure that he take a multivitamin vitamin D on a regular basis, and remained medically and physically active. Followup in 6 months time or earlier if needed Signed By: Charito Ruelas MD   
 October 1, 2018 This note will not be viewable in [address] If you have questions, please do not hesitate to call me. I look forward to following Mr. Niko Linares along with you. Sincerely, Collette Arreguin MD

## 2018-10-01 NOTE — MR AVS SNAPSHOT
[address] [place] [address], 
[address], [address] 0 [facility] 
[phone] Patient: Jonah Alba MRN: [id number] ZTQ:[id number] Visit Information Date & Time Provider Department Dept. Phone Encounter #  
 10/1/2018  8:40 AM Mi Tsai MD [facility] [phone] [id number] Follow-up Instructions Return in about 6 months (around 4/1/2019). Upcoming Health Maintenance Date Due Hepatitis C Screening 1965 DTaP/Tdap/Td series (1 - Tdap) 3/19/1984 Shingrix Vaccine Age 50> (1 of 2) 3/19/2013 FOBT Q 1 YEAR AGE 50-75 3/19/2013 Influenza Age 5 to Adult 8/1/2018 Allergies as of 10/1/2018  Review Complete On: 10/1/2018 By: Mi Tsai MD  
 No Known Allergies Current Immunizations  Never Reviewed No immunizations on file. Not reviewed this visit You Were Diagnosed With   
  
 Codes Comments [facility]    -  Primary ICD-10-CM: G35 
ICD-9-CM: 003 Benign essential tremor     ICD-10-CM: G25.0 ICD-9-CM: 333.1 Lumbar back pain with radiculopathy affecting right lower extremity     ICD-10-CM: M54.16 
ICD-9-CM: 724.4 Erectile dysfunction due to diseases classified elsewhere     ICD-10-CM: N52.1 ICD-9-CM: 607.84 Spastic neurogenic bladders     ICD-10-CM: N31.9 ICD-9-CM: 596.54 Vitals BP Pulse Height(growth percentile) Weight(growth percentile) SpO2 BMI  
 126/80 62 5' 4\" (1.626 m) 202 lb (91.6 kg) 95% 34.67 kg/m2 Smoking Status Never Smoker BMI and BSA Data Body Mass Index Body Surface Area  
 34.67 kg/m 2 2.03 m 2 Preferred Pharmacy Pharmacy Name Phone [facility] [place] [phone] Your Updated Medication List  
  
   
This list is accurate as of 10/1/18  9:24 AM.  Always use your most recent med list.  
  
  
  
  
 ALEVE 220 mg tablet Generic drug:  naproxen sodium Take 220 mg by mouth as directed. Take 30 minutes before Avonex injections. AVONEX 30 mcg/0.5 mL Pnkt Generic drug:  interferon beta-1a INJECT ONE PEN (30 MCG) INTRAMUSCULARLY ONCE WEEKLY. REFRIGERATE. PROTECT FROM LIGHT. ALLOW TO WARM TO ROOM TEMPERATURE PRIOR TO USE. CENTRUM SILVER Tab tablet Generic drug:  multivitamins-minerals-lutein Take  by mouth. cetirizine 10 mg tablet Commonly known as:  ZYRTEC Take 10 mg by mouth daily. * tadalafil 20 mg tablet Commonly known as:  CIALIS Take 1 Tab by mouth as needed. * CIALIS 10 mg tablet Generic drug:  tadalafil  
take 1 tablet by mouth once daily as directed VITAMIN D3 1,000 unit Cap Generic drug:  cholecalciferol Take  by mouth. * Notice: This list has 2 medication(s) that are the same as other medications prescribed for you. Read the directions carefully, and ask your doctor or other care provider to review them with you. We Performed the Following CBC WITH AUTOMATED DIFF [13110 CPT(R)] METABOLIC PANEL, COMPREHENSIVE [30036 CPT(R)] REFERRAL TO PHYSICAL THERAPY [BIW15 Custom] Comments:  
 Gait training and strengthening for ataxia and MS Follow-up Instructions Return in about 6 months (around 4/1/2019). To-Do List   
 10/08/2018 Imaging:  MRI BRAIN W WO CONT   
  
 10/08/2018 Imaging:  MRI CERV SPINE W WO CONT Referral Information Referral ID Referred By Referred To  
  
 [id number] Catherine KHAN [facility] OP PT   
   [address] [facility] [address], [facility] Phone: [phone] Fax: [phone] Visits Status Start Date End Date 1 New Request 10/1/18 10/1/19 If your referral has a status of pending review or denied, additional information will be sent to support the outcome of this decision. Patient Instructions Office Policies o Phone calls/patient messages: Please allow up to 24 hours for someone in the office to contact you about your call or message. Be mindful your provider may be out of the office or your message may require further review. We encourage you to use [employer] for your messages as this is a faster, more efficient way to communicate with our office 
 
o Medication Refills: 
Prescription medications require up to 48 business hours to process. We encourage you to use [employer] for your refills. For controlled medications: Please allow up to 72 business hours to process. Certain medications may require you to  a written prescription at our office. NO narcotic/controlled medications will be prescribed after 4pm Monday through Friday or on weekends 
 
o Form/Paperwork Completion: 
Please note there is a $25 fee for all paperwork completed by our providers. We ask that you allow 7-14 business days. Pre-payment is due prior to picking up/faxing the completed form. You may also download your forms to [employer] to have your doctor print off. A Healthy Lifestyle: Care Instructions Your Care Instructions A healthy lifestyle can help you feel good, stay at a healthy weight, and have plenty of energy for both work and play. A healthy lifestyle is something you can share with your whole family. A healthy lifestyle also can lower your risk for serious health problems, such as high blood pressure, heart disease, and diabetes. You can follow a few steps listed below to improve your health and the health of your family. Follow-up care is a key part of your treatment and safety. Be sure to make and go to all appointments, and call your doctor if you are having problems. It's also a good idea to know your test results and keep a list of the medicines you take. How can you care for yourself at home? · Do not eat too much sugar, fat, or fast foods.  You can still have dessert and treats now and then. The goal is moderation. · Start small to improve your eating habits. Pay attention to portion sizes, drink less juice and soda pop, and eat more fruits and vegetables. ¨ Eat a healthy amount of food. A 3-ounce serving of meat, for example, is about the size of a deck of cards. Fill the rest of your plate with vegetables and whole grains. ¨ Limit the amount of soda and sports drinks you have every day. Drink more water when you are thirsty. ¨ Eat at least 5 servings of fruits and vegetables every day. It may seem like a lot, but it is not hard to reach this goal. A serving or helping is 1 piece of fruit, 1 cup of vegetables, or 2 cups of leafy, raw vegetables. Have an apple or some carrot sticks as an afternoon snack instead of a candy bar. Try to have fruits and/or vegetables at every meal. 
· Make exercise part of your daily routine. You may want to start with simple activities, such as walking, bicycling, or slow swimming. Try to be active 30 to 60 minutes every day. You do not need to do all 30 to 60 minutes all at once. For example, you can exercise 3 times a day for 10 or 20 minutes. Moderate exercise is safe for most people, but it is always a good idea to talk to your doctor before starting an exercise program. 
· Keep moving. Edwena Rust the lawn, work in the garden, or [employer]. Take the stairs instead of the elevator at work. · If you smoke, quit. People who smoke have an increased risk for heart attack, stroke, cancer, and other lung illnesses. Quitting is hard, but there are ways to boost your chance of quitting tobacco for good. ¨ Use nicotine gum, patches, or lozenges. ¨ Ask your doctor about stop-smoking programs and medicines. ¨ Keep trying.  
In addition to reducing your risk of diseases in the future, you will notice some benefits soon after you stop using tobacco. If you have shortness of breath or asthma symptoms, they will likely get better within a few weeks after you quit. · Limit how much alcohol you drink. Moderate amounts of alcohol (up to 2 drinks a day for men, 1 drink a day for women) are okay. But drinking too much can lead to liver problems, high blood pressure, and other health problems. Family health If you have a family, there are many things you can do together to improve your health. · Eat meals together as a family as often as possible. · Eat healthy foods. This includes fruits, vegetables, lean meats and dairy, and whole grains. · Include your family in your fitness plan. Most people think of activities such as jogging or tennis as the way to fitness, but there are many ways you and your family can be more active. Anything that makes you breathe hard and gets your heart pumping is exercise. Here are some tips: 
¨ Walk to do errands or to take your child to school or the bus. ¨ Go for a family bike ride after dinner instead of watching TV. Where can you learn more? Go to http://puneet-estephania.info/. Enter L198 in the search box to learn more about \"A Healthy Lifestyle: Care Instructions. \" Current as of: December 7, 2017 Content Version: 11.7 © 4121-8955 [employer]. Care instructions adapted under license by [employer] (which disclaims liability or warranty for this information). If you have questions about a medical condition or this instruction, always ask your healthcare professional. [place] [address] any warranty or liability for your use of this information. Introducing [facility]! Rui Broussard introduces [employer] patient portal. Now you can access parts of your medical record, email your doctor's office, and request medication refills online. 1. In your internet browser, go to [email or web address] 2. Click on the First Time User? Click Here link in the Sign In box. You will see the New Member Sign Up page. 3. Enter your copygram Access Code exactly as it appears below. You will not need to use this code after youve completed the sign-up process. If you do not sign up before the expiration date, you must request a new code. · copygram Access Code: [email or web address] Expires: 12/30/2018  8:35 AM 
 
4. Enter the last four digits of your Social Security Number (xxxx) and Date of Birth (mm/dd/yyyy) as indicated and click Submit. You will be taken to the next sign-up page. 5. Create a copygram ID. This will be your copygram login ID and cannot be changed, so think of one that is secure and easy to remember. 6. Create a copygram password. You can change your password at any time. 7. Enter your Password Reset Question and Answer. This can be used at a later time if you forget your password. 8. Enter your e-mail address. You will receive e-mail notification when new information is available in [address]. 9. Click Sign Up. You can now view and download portions of your medical record. 10. Click the Download Summary menu link to download a portable copy of your medical information. If you have questions, please visit the Frequently Asked Questions section of the copygram website. Remember, copygram is NOT to be used for urgent needs. For medical emergencies, dial 911. Now available from your iPhone and Android! Please provide this summary of care documentation to your next provider. Your primary care clinician is listed as Jess Tello. If you have any questions after today's visit, please call [phone].

## 2018-10-01 NOTE — PATIENT INSTRUCTIONS
Office Policies 
 
o Phone calls/patient messages: Please allow up to 24 hours for someone in the office to contact you about your call or message. Be mindful your provider may be out of the office or your message may require further review. We encourage you to use [employer] for your messages as this is a faster, more efficient way to communicate with our office 
 
o Medication Refills: 
Prescription medications require up to 48 business hours to process. We encourage you to use [employer]io for your refills. For controlled medications: Please allow up to 72 business hours to process. Certain medications may require you to  a written prescription at our office. NO narcotic/controlled medications will be prescribed after 4pm Monday through Friday or on weekends 
 
o Form/Paperwork Completion: 
Please note there is a $25 fee for all paperwork completed by our providers. We ask that you allow 7-14 business days. Pre-payment is due prior to picking up/faxing the completed form. You may also download your forms to Intentio to have your doctor print off. A Healthy Lifestyle: Care Instructions Your Care Instructions A healthy lifestyle can help you feel good, stay at a healthy weight, and have plenty of energy for both work and play. A healthy lifestyle is something you can share with your whole family. A healthy lifestyle also can lower your risk for serious health problems, such as high blood pressure, heart disease, and diabetes. You can follow a few steps listed below to improve your health and the health of your family. Follow-up care is a key part of your treatment and safety. Be sure to make and go to all appointments, and call your doctor if you are having problems. It's also a good idea to know your test results and keep a list of the medicines you take. How can you care for yourself at home? · Do not eat too much sugar, fat, or fast foods.  You can still have dessert and treats now and then. The goal is moderation. · Start small to improve your eating habits. Pay attention to portion sizes, drink less juice and soda pop, and eat more fruits and vegetables. ¨ Eat a healthy amount of food. A 3-ounce serving of meat, for example, is about the size of a deck of cards. Fill the rest of your plate with vegetables and whole grains. ¨ Limit the amount of soda and sports drinks you have every day. Drink more water when you are thirsty. ¨ Eat at least 5 servings of fruits and vegetables every day. It may seem like a lot, but it is not hard to reach this goal. A serving or helping is 1 piece of fruit, 1 cup of vegetables, or 2 cups of leafy, raw vegetables. Have an apple or some carrot sticks as an afternoon snack instead of a candy bar. Try to have fruits and/or vegetables at every meal. 
· Make exercise part of your daily routine. You may want to start with simple activities, such as walking, bicycling, or slow swimming. Try to be active 30 to 60 minutes every day. You do not need to do all 30 to 60 minutes all at once. For example, you can exercise 3 times a day for 10 or 20 minutes. Moderate exercise is safe for most people, but it is always a good idea to talk to your doctor before starting an exercise program. 
· Keep moving. Izabella Distad the lawn, work in the garden, or [employer]. Take the stairs instead of the elevator at work. · If you smoke, quit. People who smoke have an increased risk for heart attack, stroke, cancer, and other lung illnesses. Quitting is hard, but there are ways to boost your chance of quitting tobacco for good. ¨ Use nicotine gum, patches, or lozenges. ¨ Ask your doctor about stop-smoking programs and medicines. ¨ Keep trying.  
In addition to reducing your risk of diseases in the future, you will notice some benefits soon after you stop using tobacco. If you have shortness of breath or asthma symptoms, they will likely get better within a few weeks after you quit. · Limit how much alcohol you drink. Moderate amounts of alcohol (up to 2 drinks a day for men, 1 drink a day for women) are okay. But drinking too much can lead to liver problems, high blood pressure, and other health problems. Family health If you have a family, there are many things you can do together to improve your health. · Eat meals together as a family as often as possible. · Eat healthy foods. This includes fruits, vegetables, lean meats and dairy, and whole grains. · Include your family in your fitness plan. Most people think of activities such as jogging or tennis as the way to fitness, but there are many ways you and your family can be more active. Anything that makes you breathe hard and gets your heart pumping is exercise. Here are some tips: 
¨ Walk to do errands or to take your child to school or the bus. ¨ Go for a family bike ride after dinner instead of watching TV. Where can you learn more? Go to http://puneet-estephania.info/. Enter U795 in the search box to learn more about \"A Healthy Lifestyle: Care Instructions. \" Current as of: December 7, 2017 Content Version: 11.7 © 5214-8021 [employer]. Care instructions adapted under license by [employer] (which disclaims liability or warranty for this information). If you have questions about a medical condition or this instruction, always ask your healthcare professional. [place] [address] any warranty or liability for your use of this information.

## 2018-10-01 NOTE — PROGRESS NOTES
Consult Subjective:  
 
Liban Epstein is a 52 y.o. Right-handed Afro-American male seen for evaluation at the request of Dr. Porsha Lomax of a new problem of increasing unsteadiness in walking, being more clumsy, no longer able to move fast, and feeling that his right side may be a little bit worse as far as his weakness and numbness for the last several weeks. His last MRI scan was over 3 years ago, we will repeat that, he did have some progression of nonenhancing white matter lesions indicating perhaps some progression of disease. Patient does have multiple sclerosis and is currently on Avonex once a week. His medications were just refilled. His back pain is gotten much better with physical therapy and treatment with orthopedist that he no longer has a problem with that. He has problem with neurogenic bladder, but the Detrol 4 mg LA  we gave him for frequent urination during the daytime helped initially but no longer seems to help him so he stopped the medication. He does not take Cialis for his erectile dysfunction anymore either. He has no difficulty with his bowel movements, but does have to get up to void 2 times a night. He has a slight tremor in his hands when he tries to hold things or grab things. It does not occur at rest and is not associated with gait abnormalities. He has no family history of similar problems. He is also seen because of his MS. He had optic neuritis in the right eye one year ago. His vision has improved and the eye pain is gone. His MRI scan showed a lesion in his right optic nerve that was acute, with stable slightly progressive white matter disease typical of his MS. He had no other new neurological symptoms. He does have erectile dysfunction, and had been given shot therapy by a urologist. He is on Avonex therapy once a week. He denies any side effect from the injections, other than a rare flulike episode.  This is his first exacerbation in many years. He has had no other new focal weakness, visual problems, sensory loss, cognitive issues, gait problems, fevers or trauma or other new neurological symptoms. A complete review of systems and symptoms he's had no other new medical problems, complications or illnesses except for the erectile dysfunction and MS and tremor. Past Medical History:  
Diagnosis Date  Multiple sclerosis ([facility] [address] History reviewed. No pertinent surgical history. Family History Problem Relation Age of Onset  Hypertension Mother  Heart Disease Mother  High Cholesterol Mother  Arthritis-osteo Father  Hypertension Brother  Cancer Brother   
  lung  Hypertension Brother  Cancer Brother   
  prostate  Hypertension Sister  Other Sister   
  cerebral aneurysm Social History Substance Use Topics  Smoking status: Never Smoker  Smokeless tobacco: Never Used  Alcohol use No  
   
Current Outpatient Prescriptions Medication Sig Dispense Refill  AVONEX 30 mcg/0.5 mL pnkt INJECT ONE PEN (30 MCG) INTRAMUSCULARLY ONCE WEEKLY. REFRIGERATE. PROTECT FROM LIGHT. ALLOW TO WARM TO ROOM TEMPERATURE PRIOR TO USE. 3 Kit 5  
 CIALIS 10 mg tablet take 1 tablet by mouth once daily as directed 12 Tab 1  
 tadalafil (CIALIS) 20 mg tablet Take 1 Tab by mouth as needed. 6 Tab 11  
 cetirizine (ZYRTEC) 10 mg tablet Take 10 mg by mouth daily.  naproxen sodium (ALEVE) 220 mg tablet Take 220 mg by mouth as directed. Take 30 minutes before Avonex injections.  multivitamins-minerals-lutein (CENTRUM SILVER) Tab Take  by mouth.  Cholecalciferol, Vitamin D3, (VITAMIN D3) 1,000 unit cap Take  by mouth. No Known Allergies Review of Systems: A comprehensive review of systems was negative except for: Eyes: positive for visual disturbance Genitourinary: positive for erectile dysfunction Musculoskeletal: positive for myalgias, arthralgias and stiff joints Neurological: positive for tremor and visual loss Vitals:  
 10/01/18 5646 BP: 126/80 Pulse: 62 SpO2: 95% Weight: 202 lb (91.6 kg) Height: 5' 4\" (1.626 m) Objective: I 
 
 
NEUROLOGICAL EXAM: 
 
Appearance: The patient is well developed, well nourished, provides a coherent history and is in no acute distress. Mental Status: Oriented to time, place and person, and the president. Mood and affect appropriate. Speech is fluent without aphasia or dysarthria Cranial Nerves:   Intact visual fields. Fundi show bilateral optic pallor, right side greater than left , with mild optic atrophy seen bilaterally. Visual acuity is difficult because the patient has marked presbyopia. Nolberto GRAJEDA, EOM's full, no nystagmus, no ptosis. Facial sensation is normal. Corneal reflexes are not tested. Facial movement is symmetric. Hearing is normal bilaterally. Palate is midline with normal sternocleidomastoid and trapezius muscles are normal. Tongue is midline. Neck is supple without meningismus or bruits. Temporal arteries are not tender or enlarged Motor:  5/5 strength in upper and lower proximal and distal muscles, but the right lower extremity shows strength about 4/5, and he has decreased rapid alternating movement in his right leg, and a trace in his right hand with slight weakness in the right upper extremity. Normal bulk and increased tone. No fasciculations. Reflexes:   Deep tendon reflexes 2+/4 and symmetrical. 
No babinski or clonus present Straight leg raising test is negative in the sitting position bilaterally He has some mild percussion tenderness over the lower lumbar spine Sensory:   Normal to touch, pinprick and vibration and temperature . Gait:  Abnormal gait, the patient has slight stiffness in his legs and slightly clumsy in his walking, particular on the right side which has a spastic right hemiparesis, and he tends to stumble occasionally on the right leg. Tremor:   Mild bilateral intention tremor noted right greater than left. Cerebellar:   Mildly abnormal Romberg and tandem cerebellar signs present. Neurovascular:  Normal heart sounds and regular rhythm, peripheral pulses decreased, and no carotid bruits. Assessment: ICD-10-CM ICD-9-CM 1. Multiple sclerosis ([address] G35 340 MRI BRAIN W WO CONT  
   MRI CERV SPINE W WO CONT  
   CBC WITH AUTOMATED DIFF  
   METABOLIC PANEL, COMPREHENSIVE REFERRAL TO PHYSICAL THERAPY 2. Benign essential tremor G25.0 333.1 MRI BRAIN W WO CONT  
   MRI CERV SPINE W WO CONT  
   CBC WITH AUTOMATED DIFF  
   METABOLIC PANEL, COMPREHENSIVE REFERRAL TO PHYSICAL THERAPY 3. Lumbar back pain with radiculopathy affecting right lower extremity M54.16 724.4 MRI BRAIN W WO CONT  
   MRI CERV SPINE W WO CONT  
   CBC WITH AUTOMATED DIFF  
   METABOLIC PANEL, COMPREHENSIVE REFERRAL TO PHYSICAL THERAPY 4. Erectile dysfunction due to diseases classified elsewhere N52.1 607.84 MRI BRAIN W WO CONT  
   MRI CERV SPINE W WO CONT  
   CBC WITH AUTOMATED DIFF  
   METABOLIC PANEL, COMPREHENSIVE REFERRAL TO PHYSICAL THERAPY 5. Spastic neurogenic bladders N31.9 596.54 MRI BRAIN W WO CONT  
   MRI CERV SPINE W WO CONT  
   CBC WITH AUTOMATED DIFF  
   METABOLIC PANEL, COMPREHENSIVE REFERRAL TO PHYSICAL THERAPY Plan:  
 
Patient with new problem of progressive ataxia and clumsiness on the right side, may be having an exacerbation of his MS, he did check his MRI of the brain and cervical spine that has been over 3 years since his last MRIs, and his last MRIs done over 3-1/2 years ago did show increased nonenhancing disease activity in both the spine and the brain, and he may need to change his therapy from Avonex to another agent or disease modifying drug to try to stop his progression We also did complete metabolic studies to rule out other causes of his symptoms, and he will check my chart for results of his lab test and MRI We sent him to physical therapy for gait training and strengthening His medication of Avonex was already just renewed, so we did not renew that again Patient sciatica is much better with exercise program, at this time no further treatment needed. Patient with refractory erectile dysfunction, will try to increase his sialoliths as tolerated. His neurogenic bladder was better on the Detrol, but he discontinued it because it did not seem to help and does not want any new medication and does not want to see a urologist 
For his MS he is to continue his Avonex, and metabolic parameters will be checked today and his medications were renewed. Patient has neurogenic bladder secondary to his MS, most likely a spastic bladder with urge incontinence Routine metabolic parameters also checked to rule out side effects of his therapy on Avonex Patient's essential tremor doesn't seem significant amount to treat yet, we will follow him he will call if any problems He is to continue his Avonex therapy for his MS and call us if there is any problem We discussed other disease modifying drugs in view of his mild progression on his MRI scan, but the patient wants to continue his current therapy He is encouraged to make sure that he take a multivitamin vitamin D on a regular basis, and remained medically and physically active. Followup in 6 months time or earlier if needed Signed By: Hamilton Powers MD   
 October 1, 2018 This note will not be viewable in [address]

## 2018-11-08 ENCOUNTER — APPOINTMENT (OUTPATIENT)
Dept: GENERAL RADIOLOGY | Age: 53
End: 2018-11-08
Attending: EMERGENCY MEDICINE
Payer: COMMERCIAL

## 2018-11-08 ENCOUNTER — APPOINTMENT (OUTPATIENT)
Dept: CT IMAGING | Age: 53
End: 2018-11-08
Attending: EMERGENCY MEDICINE
Payer: COMMERCIAL

## 2018-11-08 ENCOUNTER — TELEPHONE (OUTPATIENT)
Dept: NEUROLOGY | Age: 53
End: 2018-11-08

## 2018-11-08 ENCOUNTER — HOSPITAL ENCOUNTER (EMERGENCY)
Age: 53
Discharge: HOME | End: 2018-11-08
Attending: EMERGENCY MEDICINE
Payer: COMMERCIAL

## 2018-11-08 VITALS
RESPIRATION RATE: 16 BRPM | OXYGEN SATURATION: 97 % | BODY MASS INDEX: 34.15 KG/M2 | HEART RATE: 91 BPM | DIASTOLIC BLOOD PRESSURE: 70 MMHG | TEMPERATURE: 100.1 F | WEIGHT: 200 LBS | HEIGHT: 64 IN | SYSTOLIC BLOOD PRESSURE: 121 MMHG

## 2018-11-08 DIAGNOSIS — R53.1 WEAKNESS: Primary | ICD-10-CM

## 2018-11-08 DIAGNOSIS — N30.00 ACUTE CYSTITIS WITHOUT HEMATURIA: ICD-10-CM

## 2018-11-08 LAB
ALBUMIN SERPL-MCNC: 3.9 G/DL (ref 3.5–5)
ALBUMIN/GLOB SERPL: 1 {RATIO} (ref 1.1–2.2)
ALP SERPL-CCNC: 81 U/L (ref 45–117)
ALT SERPL-CCNC: 34 U/L (ref 12–78)
ANION GAP SERPL CALC-SCNC: 8 MMOL/L (ref 5–15)
APPEARANCE UR: CLEAR
AST SERPL-CCNC: 29 U/L (ref 15–37)
BACTERIA URNS QL MICRO: ABNORMAL /HPF
BASOPHILS # BLD: 0 K/UL (ref 0–0.1)
BASOPHILS NFR BLD: 0 % (ref 0–1)
BILIRUB SERPL-MCNC: 0.9 MG/DL (ref 0.2–1)
BILIRUB UR QL: NEGATIVE
BUN SERPL-MCNC: 12 MG/DL (ref 6–20)
BUN/CREAT SERPL: 11 (ref 12–20)
CALCIUM SERPL-MCNC: 9 MG/DL (ref 8.5–10.1)
CHLORIDE SERPL-SCNC: 102 MMOL/L (ref 97–108)
CO2 SERPL-SCNC: 26 MMOL/L (ref 21–32)
COLOR UR: ABNORMAL
COMMENT, HOLDF: NORMAL
CREAT SERPL-MCNC: 1.11 MG/DL (ref 0.7–1.3)
DIFFERENTIAL METHOD BLD: ABNORMAL
EOSINOPHIL # BLD: 0 K/UL (ref 0–0.4)
EOSINOPHIL NFR BLD: 0 % (ref 0–7)
EPITH CASTS URNS QL MICRO: ABNORMAL /LPF
ERYTHROCYTE [DISTWIDTH] IN BLOOD BY AUTOMATED COUNT: 13.1 % (ref 11.5–14.5)
GLOBULIN SER CALC-MCNC: 4.1 G/DL (ref 2–4)
GLUCOSE SERPL-MCNC: 118 MG/DL (ref 65–100)
GLUCOSE UR STRIP.AUTO-MCNC: NEGATIVE MG/DL
HCT VFR BLD AUTO: 45 % (ref 36.6–50.3)
HGB BLD-MCNC: 15.2 G/DL (ref 12.1–17)
HGB UR QL STRIP: NEGATIVE
HYALINE CASTS URNS QL MICRO: ABNORMAL /LPF (ref 0–5)
IMM GRANULOCYTES # BLD: 0.1 K/UL (ref 0–0.04)
IMM GRANULOCYTES NFR BLD AUTO: 1 % (ref 0–0.5)
KETONES UR QL STRIP.AUTO: NEGATIVE MG/DL
LACTATE SERPL-SCNC: 1.7 MMOL/L (ref 0.4–2)
LEUKOCYTE ESTERASE UR QL STRIP.AUTO: ABNORMAL
LYMPHOCYTES # BLD: 1.2 K/UL (ref 0.8–3.5)
LYMPHOCYTES NFR BLD: 7 % (ref 12–49)
MCH RBC QN AUTO: 30.6 PG (ref 26–34)
MCHC RBC AUTO-ENTMCNC: 33.8 G/DL (ref 30–36.5)
MCV RBC AUTO: 90.7 FL (ref 80–99)
MONOCYTES # BLD: 2 K/UL (ref 0–1)
MONOCYTES NFR BLD: 11 % (ref 5–13)
NEUTS SEG # BLD: 15.6 K/UL (ref 1.8–8)
NEUTS SEG NFR BLD: 82 % (ref 32–75)
NITRITE UR QL STRIP.AUTO: NEGATIVE
NRBC # BLD: 0 K/UL (ref 0–0.01)
NRBC BLD-RTO: 0 PER 100 WBC
PH UR STRIP: 8 [PH] (ref 5–8)
PLATELET # BLD AUTO: 256 K/UL (ref 150–400)
PMV BLD AUTO: 10 FL (ref 8.9–12.9)
POTASSIUM SERPL-SCNC: 3.4 MMOL/L (ref 3.5–5.1)
PROT SERPL-MCNC: 8 G/DL (ref 6.4–8.2)
PROT UR STRIP-MCNC: NEGATIVE MG/DL
RBC # BLD AUTO: 4.96 M/UL (ref 4.1–5.7)
RBC #/AREA URNS HPF: ABNORMAL /HPF (ref 0–5)
SAMPLES BEING HELD,HOLD: NORMAL
SODIUM SERPL-SCNC: 136 MMOL/L (ref 136–145)
SP GR UR REFRACTOMETRY: 1 (ref 1–1.03)
UA: UC IF INDICATED,UAUC: ABNORMAL
UROBILINOGEN UR QL STRIP.AUTO: 1 EU/DL (ref 0.2–1)
WBC # BLD AUTO: 19 K/UL (ref 4.1–11.1)
WBC URNS QL MICRO: ABNORMAL /HPF (ref 0–4)

## 2018-11-08 PROCEDURE — 70450 CT HEAD/BRAIN W/O DYE: CPT

## 2018-11-08 PROCEDURE — 80053 COMPREHEN METABOLIC PANEL: CPT

## 2018-11-08 PROCEDURE — 87077 CULTURE AEROBIC IDENTIFY: CPT

## 2018-11-08 PROCEDURE — 96365 THER/PROPH/DIAG IV INF INIT: CPT

## 2018-11-08 PROCEDURE — 74011000258 HC RX REV CODE- 258: Performed by: EMERGENCY MEDICINE

## 2018-11-08 PROCEDURE — 83605 ASSAY OF LACTIC ACID: CPT

## 2018-11-08 PROCEDURE — 36415 COLL VENOUS BLD VENIPUNCTURE: CPT

## 2018-11-08 PROCEDURE — 87086 URINE CULTURE/COLONY COUNT: CPT

## 2018-11-08 PROCEDURE — 96361 HYDRATE IV INFUSION ADD-ON: CPT

## 2018-11-08 PROCEDURE — 99285 EMERGENCY DEPT VISIT HI MDM: CPT

## 2018-11-08 PROCEDURE — 74011250637 HC RX REV CODE- 250/637: Performed by: EMERGENCY MEDICINE

## 2018-11-08 PROCEDURE — 70498 CT ANGIOGRAPHY NECK: CPT

## 2018-11-08 PROCEDURE — 87040 BLOOD CULTURE FOR BACTERIA: CPT

## 2018-11-08 PROCEDURE — 74011250636 HC RX REV CODE- 250/636: Performed by: EMERGENCY MEDICINE

## 2018-11-08 PROCEDURE — 87186 SC STD MICRODIL/AGAR DIL: CPT

## 2018-11-08 PROCEDURE — 85025 COMPLETE CBC W/AUTO DIFF WBC: CPT

## 2018-11-08 PROCEDURE — 81001 URINALYSIS AUTO W/SCOPE: CPT

## 2018-11-08 PROCEDURE — 93005 ELECTROCARDIOGRAM TRACING: CPT

## 2018-11-08 PROCEDURE — 74011636320 HC RX REV CODE- 636/320: Performed by: EMERGENCY MEDICINE

## 2018-11-08 PROCEDURE — 71045 X-RAY EXAM CHEST 1 VIEW: CPT

## 2018-11-08 RX ORDER — CEPHALEXIN 500 MG/1
500 CAPSULE ORAL 4 TIMES DAILY
Qty: 28 CAP | Refills: 0 | Status: SHIPPED | OUTPATIENT
Start: 2018-11-08 | End: 2018-11-15

## 2018-11-08 RX ORDER — SODIUM CHLORIDE 9 MG/ML
50 INJECTION, SOLUTION INTRAVENOUS
Status: COMPLETED | OUTPATIENT
Start: 2018-11-08 | End: 2018-11-08

## 2018-11-08 RX ORDER — SODIUM CHLORIDE 0.9 % (FLUSH) 0.9 %
10 SYRINGE (ML) INJECTION
Status: COMPLETED | OUTPATIENT
Start: 2018-11-08 | End: 2018-11-08

## 2018-11-08 RX ORDER — SODIUM CHLORIDE 0.9 % (FLUSH) 0.9 %
5-10 SYRINGE (ML) INJECTION AS NEEDED
Status: DISCONTINUED | OUTPATIENT
Start: 2018-11-08 | End: 2018-11-09 | Stop reason: HOSPADM

## 2018-11-08 RX ORDER — ACETAMINOPHEN 500 MG
1000 TABLET ORAL
Status: COMPLETED | OUTPATIENT
Start: 2018-11-08 | End: 2018-11-08

## 2018-11-08 RX ADMIN — SODIUM CHLORIDE 1000 ML: 900 INJECTION, SOLUTION INTRAVENOUS at 19:39

## 2018-11-08 RX ADMIN — SODIUM CHLORIDE 50 ML/HR: 900 INJECTION, SOLUTION INTRAVENOUS at 19:23

## 2018-11-08 RX ADMIN — Medication 10 ML: at 19:23

## 2018-11-08 RX ADMIN — ACETAMINOPHEN 1000 MG: 500 TABLET ORAL at 20:21

## 2018-11-08 RX ADMIN — CEFTRIAXONE 1 G: 1 INJECTION, POWDER, FOR SOLUTION INTRAMUSCULAR; INTRAVENOUS at 21:35

## 2018-11-08 RX ADMIN — IOPAMIDOL 100 ML: 755 INJECTION, SOLUTION INTRAVENOUS at 19:22

## 2018-11-08 RX ADMIN — SODIUM CHLORIDE 1000 ML: 900 INJECTION, SOLUTION INTRAVENOUS at 20:22

## 2018-11-08 NOTE — ED PROVIDER NOTES
EMERGENCY DEPARTMENT HISTORY AND PHYSICAL EXAM 
 
 
Date: 11/8/2018 Patient Name: Wilmer Vickers History of Presenting Illness Chief Complaint Patient presents with  Extremity Weakness  
  to triage in a wheelchair, that started at 10am while riding in the car, denies hx of a stroke, pt stated his coworkers had to help him stand up, A&Ox2  Fever History Provided By: Patient HPI: Wilmer Vickers, 52 y.o. male with PMHx significant for MS, presents via wheelchair to the ED for further evaluation of new onset gait problem with B/L lower extremity weakness x1000 this morning. Pt reports associated sx of a 102.7F fever as well. He expresses around 1000 this morning he noticed he was unable to stand due to B/L leg weakness. Pt discloses there were no exacerbating or alleviating factors to his weakness leading him to the ED. Pt ensures his weakness has been improving upon ED arrival and is currently without complaints. He specifically denies any chills, nausea, vomiting, chest pain, shortness of breath, headache, rash, diarrhea, sweating or weight loss. There are no other complaints, changes, or physical findings at this time. PCP: Balwinder Sampson MD 
SHx: (-)Tobacco; (-) ETOH; (-) Illicit drug use Current Outpatient Medications Medication Sig Dispense Refill  AVONEX 30 mcg/0.5 mL pnkt INJECT ONE PEN (30 MCG) INTRAMUSCULARLY ONCE WEEKLY. REFRIGERATE. PROTECT FROM LIGHT. ALLOW TO WARM TO ROOM TEMPERATURE PRIOR TO USE. 3 Kit 5  
 CIALIS 10 mg tablet take 1 tablet by mouth once daily as directed 12 Tab 1  
 tadalafil (CIALIS) 20 mg tablet Take 1 Tab by mouth as needed. 6 Tab 11  
 cetirizine (ZYRTEC) 10 mg tablet Take 10 mg by mouth daily.  naproxen sodium (ALEVE) 220 mg tablet Take 220 mg by mouth as directed. Take 30 minutes before Avonex injections.  multivitamins-minerals-lutein (CENTRUM SILVER) Tab Take  by mouth.  Cholecalciferol, Vitamin D3, (VITAMIN D3) 1,000 unit cap Take  by mouth. Past History Past Medical History: 
Past Medical History:  
Diagnosis Date  Multiple sclerosis ([address] Past Surgical History: No past surgical history on file. Family History: 
Family History Problem Relation Age of Onset  Hypertension Mother  Heart Disease Mother  High Cholesterol Mother  Arthritis-osteo Father  Hypertension Brother  Cancer Brother   
     lung  Hypertension Brother  Cancer Brother   
     prostate  Hypertension Sister  Other Sister   
     cerebral aneurysm Social History: 
Social History Tobacco Use  Smoking status: Never Smoker  Smokeless tobacco: Never Used Substance Use Topics  Alcohol use: No  
 Drug use: No  
 
 
Allergies: 
No Known Allergies Review of Systems Review of Systems Constitutional: Positive for fever (102.7F). Negative for activity change, appetite change, chills, fatigue and unexpected weight change. HENT: Negative. Negative for congestion, hearing loss, rhinorrhea, sneezing and voice change. Eyes: Negative. Negative for pain and visual disturbance. Respiratory: Negative. Negative for apnea, cough, choking, chest tightness and shortness of breath. Cardiovascular: Negative. Negative for chest pain and palpitations. Gastrointestinal: Negative. Negative for abdominal distention, abdominal pain, blood in stool, diarrhea, nausea and vomiting. Genitourinary: Negative. Negative for difficulty urinating, flank pain, frequency and urgency. No discharge Musculoskeletal: Positive for gait problem. Negative for arthralgias, back pain, myalgias and neck stiffness. Skin: Negative. Negative for color change and rash. Neurological: Positive for weakness (B/L lower extremities). Negative for dizziness, seizures, syncope, speech difficulty, numbness and headaches. Hematological: Negative for adenopathy. Psychiatric/Behavioral: Negative. Negative for agitation, behavioral problems, dysphoric mood and suicidal ideas. The patient is not nervous/anxious. Physical Exam  
Physical Exam  
Constitutional: He is oriented to person, place, and time. He appears well-developed and well-nourished. No distress. HENT:  
Head: Normocephalic and atraumatic. Mouth/Throat: Oropharynx is clear and moist. No oropharyngeal exudate. Eyes: Conjunctivae and EOM are normal. Pupils are equal, round, and reactive to light. Right eye exhibits no discharge. Left eye exhibits no discharge. Neck: Normal range of motion. Neck supple. Cardiovascular: Normal rate, regular rhythm and intact distal pulses. Exam reveals no gallop and no friction rub. No murmur heard. Pulmonary/Chest: Effort normal and breath sounds normal. No respiratory distress. He has no wheezes. He has no rales. He exhibits no tenderness. Abdominal: Soft. Bowel sounds are normal. He exhibits no distension and no mass. There is no tenderness. There is no rebound and no guarding. Musculoskeletal: Normal range of motion. He exhibits no edema. Lymphadenopathy:  
  He has no cervical adenopathy. Neurological: He is alert and oriented to person, place, and time. No cranial nerve deficit. Coordination normal.  
Equal strength B/L upper and lower extremities Skin: Skin is warm and dry. No rash noted. No erythema. Psychiatric: He has a normal mood and affect. Nursing note and vitals reviewed. Diagnostic Study Results Labs - Recent Results (from the past 12 hour(s)) CBC WITH AUTOMATED DIFF Collection Time: 11/08/18  7:03 PM  
Result Value Ref Range WBC 19.0 (H) 4.1 - 11.1 K/uL  
 RBC 4.96 4.10 - 5.70 M/uL  
 HGB 15.2 12.1 - 17.0 g/dL HCT 45.0 36.6 - 50.3 % MCV 90.7 80.0 - 99.0 FL  
 MCH 30.6 26.0 - 34.0 PG  
 MCHC 33.8 30.0 - 36.5 g/dL  
 RDW 13.1 11.5 - 14.5 % PLATELET 086 327 - 232 K/uL MPV 10.0 8.9 - 12.9 FL  
 NRBC 0.0 0  WBC ABSOLUTE NRBC 0.00 0.00 - 0.01 K/uL NEUTROPHILS 82 (H) 32 - 75 % LYMPHOCYTES 7 (L) 12 - 49 % MONOCYTES 11 5 - 13 % EOSINOPHILS 0 0 - 7 % BASOPHILS 0 0 - 1 % IMMATURE GRANULOCYTES 1 (H) 0.0 - 0.5 % ABS. NEUTROPHILS 15.6 (H) 1.8 - 8.0 K/UL  
 ABS. LYMPHOCYTES 1.2 0.8 - 3.5 K/UL  
 ABS. MONOCYTES 2.0 (H) 0.0 - 1.0 K/UL  
 ABS. EOSINOPHILS 0.0 0.0 - 0.4 K/UL  
 ABS. BASOPHILS 0.0 0.0 - 0.1 K/UL  
 ABS. IMM. GRANS. 0.1 (H) 0.00 - 0.04 K/UL  
 DF AUTOMATED METABOLIC PANEL, COMPREHENSIVE Collection Time: 11/08/18  7:03 PM  
Result Value Ref Range Sodium 136 136 - 145 mmol/L Potassium 3.4 (L) 3.5 - 5.1 mmol/L Chloride 102 97 - 108 mmol/L  
 CO2 26 21 - 32 mmol/L Anion gap 8 5 - 15 mmol/L Glucose 118 (H) 65 - 100 mg/dL BUN 12 6 - 20 MG/DL Creatinine 1.11 0.70 - 1.30 MG/DL  
 BUN/Creatinine ratio 11 (L) 12 - 20 GFR est AA >60 >60 ml/min/1.73m2 GFR est non-AA >60 >60 ml/min/1.73m2 Calcium 9.0 8.5 - 10.1 MG/DL Bilirubin, total 0.9 0.2 - 1.0 MG/DL  
 ALT (SGPT) 34 12 - 78 U/L  
 AST (SGOT) 29 15 - 37 U/L Alk. phosphatase 81 45 - 117 U/L Protein, total 8.0 6.4 - 8.2 g/dL Albumin 3.9 3.5 - 5.0 g/dL Globulin 4.1 (H) 2.0 - 4.0 g/dL A-G Ratio 1.0 (L) 1.1 - 2.2 LACTIC ACID Collection Time: 11/08/18  7:03 PM  
Result Value Ref Range Lactic acid 1.7 0.4 - 2.0 MMOL/L  
SAMPLES BEING HELD Collection Time: 11/08/18  7:03 PM  
Result Value Ref Range SAMPLES BEING HELD 1RED COMMENT Add-on orders for these samples will be processed based on acceptable specimen integrity and analyte stability, which may vary by analyte. EKG, 12 LEAD, INITIAL Collection Time: 11/08/18  7:30 PM  
Result Value Ref Range Ventricular Rate 103 BPM  
 Atrial Rate 103 BPM  
 P-R Interval 154 ms QRS Duration 76 ms  
 Q-T Interval 346 ms  
 QTC Calculation (Bezet) 453 ms Calculated P Axis 39 degrees Calculated R Axis -17 degrees Calculated T Axis 44 degrees Diagnosis Sinus tachycardia No previous ECGs available URINALYSIS W/ REFLEX CULTURE Collection Time: 11/08/18  8:24 PM  
Result Value Ref Range Color YELLOW/STRAW Appearance CLEAR CLEAR Specific gravity 1.005 1.003 - 1.030    
 pH (UA) 8.0 5.0 - 8.0 Protein NEGATIVE  NEG mg/dL Glucose NEGATIVE  NEG mg/dL Ketone NEGATIVE  NEG mg/dL Bilirubin NEGATIVE  NEG Blood NEGATIVE  NEG Urobilinogen 1.0 0.2 - 1.0 EU/dL Nitrites NEGATIVE  NEG Leukocyte Esterase TRACE (A) NEG    
 UA:UC IF INDICATED URINE CULTURE ORDERED (A) CNI    
 WBC 10-20 0 - 4 /hpf  
 RBC 0-5 0 - 5 /hpf Epithelial cells FEW FEW /lpf Bacteria 1+ (A) NEG /hpf Hyaline cast 0-2 0 - 5 /lpf Radiologic Studies -  
CT Results  (Last 48 hours) 11/08/18 1922  CTA CODE NEURO HEAD AND NECK W CONT Preliminary result Narrative:  PRELIMINARY REPORT No occlusion Preliminary report was provided by Dr. Kelly Palafox, the on-call radiologist, at [facility] [address]  
hours Final report to follow. END PRELIMINARY REPORT*  
   
   
   
   
   
   
  
 11/08/18 1910  CT CODE NEURO HEAD WO CONTRAST Final result Impression:  IMPRESSION:   
   
As compared to 2009, interval development of the 2 deep white matter infarcts,  
age indeterminate. Narrative:  EXAM:  CT CODE NEURO HEAD WO CONTRAST INDICATION:   Fever. Extremity weakness. Evaluation for Stroke COMPARISON: 11/6/2009. CONTRAST:  None. TECHNIQUE: Unenhanced CT of the head was performed using 5 mm images. Brain and  
bone windows were generated. CT dose reduction was achieved through use of a  
standardized protocol tailored for this examination and automatic exposure  
control for dose modulation.     
   
FINDINGS:  
The ventricles and sulci are normal in size, shape and configuration and  
 midline. There is periventricular white matter hypodensity. A new 7.5 mm  
hypodensity is present in the left corona radiata (image 19). An additional 13  
mm hypodensity is now seen adjacent to the right lateral ventricle (image 17). Danis Matamoros Stable oval focus of encephalomalacia in the right superior cerebellar  
hemisphere (image 10). There is no intracranial hemorrhage, extra-axial  
collection, mass, mass effect or midline shift. The basilar cisterns are open. No acute infarct is identified. The bone windows demonstrate no abnormalities. The visualized portions of the paranasal sinuses and mastoid air cells are  
clear. CXR Results  (Last 48 hours) 11/08/18 1945  XR CHEST PORT Final result Impression:  IMPRESSION: No acute cardiopulmonary process seen Narrative:  EXAM:  XR CHEST PORT INDICATION:  meets SIRS criteria COMPARISON:  None. FINDINGS: A portable AP radiograph of the chest was obtained at 1921 hours. The  
patient is on a cardiac monitor. The lungs are clear. The cardiac and  
mediastinal contours and pulmonary vascularity are normal.  The bones and soft  
tissues are grossly within normal limits. Medical Decision Making I am the first provider for this patient. I reviewed the vital signs, available nursing notes, past medical history, past surgical history, family history and social history. Vital Signs-Reviewed the patient's vital signs. Patient Vitals for the past 12 hrs: 
 Temp Pulse Resp BP SpO2  
11/08/18 2112 100.1 °F (37.8 °C)      
11/08/18 2100  97 19 130/70 97 % 11/08/18 2015  95 20  97 % 11/08/18 1842 (!) 102.7 °F (39.3 °C) (!) 103 20 152/69 100 % Pulse Oximetry Analysis - 100% on room air Cardiac Monitor:  
Rate: 103 bpm 
Rhythm: Sinus Tachycardia EKG interpretation: (Preliminary) 1930 Rhythm: sinus tachycardia; and regular . Rate (approx.): 103;  Axis: normal; VT interval: normal; QRS interval: normal ; ST/T wave: normal; Other findings: non-ischemic. Records Reviewed: Nursing Notes, Old Medical Records, Previous electrocardiograms, Previous Radiology Studies and Previous Laboratory Studies Provider Notes (Medical Decision Making): DDx: MS flare, Sepsis, TIA, CVA. ED Course:  
Initial assessment performed. The patients presenting problems have been discussed, and they are in agreement with the care plan formulated and outlined with them. I have encouraged them to ask questions as they arise throughout their visit. Progress Notes: 
 
8:23 PM 
The patient states that their symptoms have resolved and they feel much better. There are no other new complaints at this time. His questions have been answered. We are awaiting final results and those will be reviewed with them when they become available. Pt expresses he is refusing to stay for further workup and would like to be discharged home.  
 
 
7:09 PM - I suspect that this patient has an active infection. 7:09 PM - The patient met criteria for severe sepsis at this time. PROVIDER SEPSIS PHYSICAL EXAM EVAL Vital signs reviewed (see nursing documentation for further details): Vitals:  
 11/08/18 1842 BP: 152/69 Pulse: (!) 103 Resp: 20 Temp: (!) 102.7 °F (39.3 °C) SpO2: 100% Cardiac exam:Tachycardic Pulmonary exam:Normal 
Peripheral pulses:Normal 
Capillary refill:Normal 
Skin exam:pink Exam performed Angela Cohen MD 
SEP-1 Core Measure Exclusion Criteria Has the patient/family refused IV fluids? no 
 
Critical Care Time: 0 minutes Disposition: 
Discharge Note: 
9:15 PM 
The patient is ready for discharge. The patient's signs, symptoms, diagnosis, and discharge instruction have been discussed and the patient has conveyed their understanding. The patient is to follow up as recommended or return to the ER should their symptoms worsen.  Plan has been discussed and the patient is in agreement. Written by Hunter Hicks ED Scribe, as dictated by Loc Fisher. Diallo Bustillo MD 
 
PLAN: 
1. Current Discharge Medication List  
  
START taking these medications Details  
cephALEXin (KEFLEX) 500 mg capsule Take 1 Cap by mouth four (4) times daily for 7 days. Qty: 28 Cap, Refills: 0  
  
  
 
2. Follow-up Information Follow up With Specialties Details Why Contact Info Melissa Gorman MD [facility] Call in 2 days  [id number] [facility] [address] [facility] [address] [facility] [address] 
[phone] Return to ED if worse Diagnosis Clinical Impression:  
1. Weakness 2. Acute cystitis without hematuria Attestations: 
 
Attestation: This note is prepared by Temi Hicks, acting as Scribe for Loc Fisher. Diallo Bustillo, [address] [facility] Diallo Bustillo MD: The scribe's documentation has been prepared under my direction and personally reviewed by me in its entirety. I confirm that the note above accurately reflects all work, treatment, procedures, and medical decision making performed by me.

## 2018-11-08 NOTE — TELEPHONE ENCOUNTER
----- Message from Javy Harding sent at 11/8/2018  1:21 PM EST -----  Regarding: Dr. Lorne Mendez  Pt's wife stated pt keeps losing his balance every time he stands up, and would like a call from the doctor to advise what he should do. Best contact number [phone] or [phone], or(wife) [phone].

## 2018-11-08 NOTE — LETTER
[facility] EM[facility]ENCY [facility] 
[address] [place] [address] [address] [id number] [phone] Work/School Note Date: 11/8/2018 To Whom It May concern: 
 
Suhail Anthony was seen and treated today in the emergency room by the following provider(s): 
Attending Provider: Stephanie Combs MD. Suhail Anthony may return to work on 11/10/18. Sincerely, Ernesto Ford RN

## 2018-11-08 NOTE — TELEPHONE ENCOUNTER
----- Message from Johanna Hutchinson sent at 11/8/2018  1:21 PM EST -----  Regarding: Dr. Chanell Perez  Pt's wife stated pt keeps losing his balance every time he stands up, and would like a call from the doctor to advise what he should do. Best contact number [phone] or [phone], or(wife) [phone].

## 2018-11-09 ENCOUNTER — TELEPHONE (OUTPATIENT)
Dept: NEUROLOGY | Age: 53
End: 2018-11-09

## 2018-11-09 LAB
ATRIAL RATE: 103 BPM
CALCULATED P AXIS, ECG09: 39 DEGREES
CALCULATED R AXIS, ECG10: -17 DEGREES
CALCULATED T AXIS, ECG11: 44 DEGREES
DIAGNOSIS, 93000: NORMAL
P-R INTERVAL, ECG05: 154 MS
Q-T INTERVAL, ECG07: 346 MS
QRS DURATION, ECG06: 76 MS
QTC CALCULATION (BEZET), ECG08: 453 MS
VENTRICULAR RATE, ECG03: 103 BPM

## 2018-11-09 NOTE — ED NOTES
ED tech informed this nurse patient was in room. Dr. Shaye Choi had evaluated patient in triage and placed orders for Code S Level 2 for this patient. [facility] had already been called on patient due to meeting sepsis criteria by the triage nurse and sepsis orders were also placed. Patient was placed in Rm 15 by ED tech. Discussed with Dr. Shaye Choi that patient has hx of MS. He would like to proceed with Code S protocol. IV established and patient transported by this nurse to CT.

## 2018-11-09 NOTE — TELEPHONE ENCOUNTER
Patient went to the ER and found something. Would like to have discussion with Dr Valdez Valdez.     Spoke with Dr Valdez Valdez before going to the hospital.  Will monitor for now

## 2018-11-09 NOTE — ED NOTES
Pt discharged by Dr. Shilpa Meyer. Pt provided with discharge instructions Rx and instructions on follow up care. Pt out of ED in stable condition accompanied by wife.

## 2018-11-09 NOTE — DISCHARGE INSTRUCTIONS
Fatigue: Care Instructions  Your Care Instructions    Fatigue is a feeling of tiredness, exhaustion, or lack of energy. You may feel fatigue because of too much or not enough activity. It can also come from stress, lack of sleep, boredom, and poor diet. Many medical problems, such as viral infections, can cause fatigue. Emotional problems, especially depression, are often the cause of fatigue. Fatigue is most often a symptom of another problem. Treatment for fatigue depends on the cause. For example, if you have fatigue because you have a certain health problem, treating this problem also treats your fatigue. If depression or anxiety is the cause, treatment may help. Follow-up care is a key part of your treatment and safety. Be sure to make and go to all appointments, and call your doctor if you are having problems. It's also a good idea to know your test results and keep a list of the medicines you take. How can you care for yourself at home? · Get regular exercise. But don't overdo it. Go back and forth between rest and exercise. · Get plenty of rest.  · Eat a healthy diet. Do not skip meals, especially breakfast.  · Reduce your use of caffeine, tobacco, and alcohol. Caffeine is most often found in coffee, tea, cola drinks, and chocolate. · Limit medicines that can cause fatigue. This includes tranquilizers and cold and allergy medicines. When should you call for help? Watch closely for changes in your health, and be sure to contact your doctor if:    · You have new symptoms such as fever or a rash.     · Your fatigue gets worse.     · You have been feeling down, depressed, or hopeless. Or you may have lost interest in things that you usually enjoy.     · You are not getting better as expected. Where can you learn more? Go to http://puneet-estephania.info/. Enter S574 in the search box to learn more about \"Fatigue: Care Instructions. \"  Current as of: November 20, 2017  Content Version: 11.8  © 5160-4191 [email or web address]. Care instructions adapted under license by [employer] (which disclaims liability or warranty for this information). If you have questions about a medical condition or this instruction, always ask your healthcare professional. [address] any warranty or liability for your use of this information. Urinary Tract Infections in Men: Care Instructions  Your Care Instructions    A urinary tract infection, or UTI, is a general term for an infection anywhere between the kidneys and the tip of the penis. UTIs can also be a result of a prostate problem. Most cause pain or burning when you urinate. Most UTIs are caused by bacteria and can be cured with antibiotics. It is important to complete your treatment so that the infection does not get worse. Follow-up care is a key part of your treatment and safety. Be sure to make and go to all appointments, and call your doctor if you are having problems. It's also a good idea to know your test results and keep a list of the medicines you take. How can you care for yourself at home? · Take your antibiotics as prescribed. Do not stop taking them just because you feel better. You need to take the full course of antibiotics. · Take your medicines exactly as prescribed. Your doctor may have prescribed a medicine, such as phenazopyridine (Pyridium), to help relieve pain when you urinate. This turns your urine orange. You may stop taking it when your symptoms get better. But be sure to take all of your antibiotics, which treat the infection. · Drink extra water for the next day or two. This will help make the urine less concentrated and help wash out the bacteria causing the infection. (If you have kidney, heart, or liver disease and have to limit your fluids, talk with your doctor before you increase your fluid intake.)  · Avoid drinks that are carbonated or have caffeine.  They can irritate the bladder. · Urinate often. Try to empty your bladder each time. · To relieve pain, take a hot bath or lay a heating pad (set on low) over your lower belly or genital area. Never go to sleep with a heating pad in place. To help prevent UTIs  · Drink plenty of fluids, enough so that your urine is light yellow or clear like water. If you have kidney, heart, or liver disease and have to limit fluids, talk with your doctor before you increase the amount of fluids you drink. · Urinate when you have the urge. Do not hold your urine for a long time. Urinate before you go to sleep. · Keep your penis clean. Catheter care  If you have a drainage tube (catheter) in place, the following steps will help you care for it. · Always wash your hands before and after touching your catheter. · Check the area around the urethra for inflammation or signs of infection. Signs of infection include irritated, swollen, red, or tender skin, or pus around the catheter. · Clean the area around the catheter with soap and water two times a day. Dry with a clean towel afterward. · Do not apply powder or lotion to the skin around the catheter. To empty the urine collection bag  · Wash your hands with soap and water. · Without touching the drain spout, remove the spout from its sleeve at the bottom of the collection bag. Open the valve on the spout. · Let the urine flow out of the bag and into the toilet or a container. Do not let the tubing or drain spout touch anything. · After you empty the bag, clean the end of the drain spout with tissue and water. Close the valve and put the drain spout back into its sleeve at the bottom of the collection bag. · Wash your hands with soap and water. When should you call for help?   Call your doctor now or seek immediate medical care if:    · Symptoms such as a fever, chills, nausea, or vomiting get worse or happen for the first time.     · You have new pain in your back just below your rib cage. This is called flank pain.     · There is new blood or pus in your urine.     · You are not able to take or keep down your antibiotics.    Watch closely for changes in your health, and be sure to contact your doctor if:    · You are not getting better after taking an antibiotic for 2 days.     · Your symptoms go away but then come back. Where can you learn more? Go to http://puneet-estephania.info/. Enter B162 in the search box to learn more about \"Urinary Tract Infections in Men: Care Instructions. \"  Current as of: March 21, 2018  Content Version: 11.8  © 3512-8450 [email or web address]. Care instructions adapted under license by [employer] (which disclaims liability or warranty for this information). If you have questions about a medical condition or this instruction, always ask your healthcare professional. [address] any warranty or liability for your use of this information.

## 2018-11-09 NOTE — TELEPHONE ENCOUNTER
Message via Hello please call ps is losing use of his legs, requesting a call back to discuss what they should do moving foward

## 2018-11-10 LAB
BACTERIA SPEC CULT: ABNORMAL
CC UR VC: ABNORMAL
SERVICE CMNT-IMP: ABNORMAL

## 2018-11-12 ENCOUNTER — TELEPHONE (OUTPATIENT)
Dept: NEUROLOGY | Age: 53
End: 2018-11-12

## 2018-11-12 NOTE — TELEPHONE ENCOUNTER
----- Message from Onesimo Javier sent at 11/12/2018  1:27 PM EST -----  Regarding: Dr Luna/telephone  Pt (p) [phone], pt said he was seen at [facility] ER on this past Thursday night 11/8/18 and had a head scan done and found two spots on his brain, and wanted to know if Dr Ghada Ng was aware and if he was able to review his scans. Pt would like a return call back to discuss the results and find out if there is anything he needs to do.

## 2018-11-13 LAB
BACTERIA SPEC CULT: NORMAL
SERVICE CMNT-IMP: NORMAL

## 2018-11-14 NOTE — TELEPHONE ENCOUNTER
I called the patient and told him it looks like one lesion was then new one we found on the MRI in August, but the other one was questionable so he will come in on Monday at noon, for evaluation and we probably need to repeat his MRI of the brain to look for new enhancing lesions otherwise he may need this changes at the next 2 other disease modifying drugs. That is if he has new lesions.     Chaz Coreas, I told patient to come at noon on Monday the 19th

## 2018-11-19 ENCOUNTER — OFFICE VISIT (OUTPATIENT)
Dept: NEUROLOGY | Age: 53
End: 2018-11-19

## 2018-11-19 VITALS
DIASTOLIC BLOOD PRESSURE: 88 MMHG | OXYGEN SATURATION: 97 % | SYSTOLIC BLOOD PRESSURE: 144 MMHG | BODY MASS INDEX: 33.46 KG/M2 | HEIGHT: 64 IN | WEIGHT: 196 LBS | HEART RATE: 70 BPM

## 2018-11-19 DIAGNOSIS — G25.0 BENIGN ESSENTIAL TREMOR: ICD-10-CM

## 2018-11-19 DIAGNOSIS — M54.16 LUMBAR BACK PAIN WITH RADICULOPATHY AFFECTING RIGHT LOWER EXTREMITY: ICD-10-CM

## 2018-11-19 DIAGNOSIS — G35 MULTIPLE SCLEROSIS (HCC): Primary | ICD-10-CM

## 2018-11-19 DIAGNOSIS — N31.8 SPASTIC NEUROGENIC BLADDERS: ICD-10-CM

## 2018-11-19 NOTE — PROGRESS NOTES
Consult Subjective:  
 
Martha Torres is a 52 y.o. Right-handed Afro-American male seen for evaluation at the request of Dr. Rashi Mishra of a new problem of recent abnormal MRI scan of the brain and cervical spine showing 1 or 2 new lesions in the brain in 2 or 3 new lesions in the spine that were not enhancing, but change in the brain from 2014, and change in the spine from 2010. He is on Avonex once a week, never to change therapy other than that. He is having more trouble with his balance coordination and walking, having more falls, more neurogenic bladder symptoms, repeated urinary tract infections, more generalized fatigue, and feels he can no longer work because he wears himself out, cannot get up in the morning get to work and time because he is so tired and fatigued, having some more memory difficulty and concentration difficulty handling his job at work. I suggested we might need to change therapy he might want to switch over to 1 of the new oral agents or other injectable therapies like Rebif. After a long discussion with the patient and his wife, they agreed to try Aubagio, and start a form filled out for the patient today and lab studies also ordered for the patient to get monthly blood checks for 6 months, and get a QuantiFERON and blood test done prior to starting therapy either today or tomorrow. Patient was seen for increasing unsteadiness in walking, being more clumsy, no longer able to move fast, and feeling that his right side may be a little bit worse as far as his weakness and numbness for the last several weeks. His back pain is gotten much better with physical therapy and treatment with orthopedist that he no longer has a problem with that.   He has problem with neurogenic bladder, but the Detrol 4 mg LA  we gave him for frequent urination during the daytime helped initially but no longer seems to help him so he stopped the medication. He does not take Cialis for his erectile dysfunction anymore either. He has no difficulty with his bowel movements, but does have to get up to void 2 times a night. He has a slight tremor in his hands when he tries to hold things or grab things. It does not occur at rest and is not associated with gait abnormalities. He had optic neuritis in the right eye one year ago. His vision has improved and the eye pain is gone. His MRI scan showed a lesion in his right optic nerve that was acute, with stable slightly progressive white matter disease typical of his MS. He had no other new neurological symptoms. He does have erectile dysfunction, and had been given shot therapy by a urologist. He is on Avonex therapy once a week. He denies any side effect from the injections, other than a rare flulike episode. This is his first exacerbation in many years. He has had no other new focal weakness, visual problems, sensory loss, cognitive issues, gait problems, fevers or trauma or other new neurological symptoms. A complete review of systems and symptoms he's had no other new medical problems, complications or illnesses except for the erectile dysfunction and MS and tremor. Past Medical History:  
Diagnosis Date  Multiple sclerosis ([facility] [address] History reviewed. No pertinent surgical history. Family History Problem Relation Age of Onset  Hypertension Mother  Heart Disease Mother  High Cholesterol Mother  Arthritis-osteo Father  Hypertension Brother  Cancer Brother   
     lung  Hypertension Brother  Cancer Brother   
     prostate  Hypertension Sister  Other Sister   
     cerebral aneurysm Social History Tobacco Use  Smoking status: Never Smoker  Smokeless tobacco: Never Used Substance Use Topics  Alcohol use: No  
   
Current Outpatient Medications Medication Sig Dispense Refill  teriflunomide (AUBAGIO) 14 mg tab Take 14 mg by mouth daily (with dinner). 30 Tab 11  
 tadalafil (CIALIS) 20 mg tablet Take 1 Tab by mouth as needed. 6 Tab 11  
 naproxen sodium (ALEVE) 220 mg tablet Take 220 mg by mouth as directed. Take 30 minutes before Avonex injections.  multivitamins-minerals-lutein (CENTRUM SILVER) Tab Take  by mouth.  Cholecalciferol, Vitamin D3, (VITAMIN D3) 1,000 unit cap Take  by mouth. No Known Allergies Review of Systems: A comprehensive review of systems was negative except for: Eyes: positive for visual disturbance Genitourinary: positive for erectile dysfunction Musculoskeletal: positive for myalgias, arthralgias and stiff joints Neurological: positive for tremor and visual loss Vitals:  
 11/19/18 1302 BP: 144/88 Pulse: 70 SpO2: 97% Weight: 196 lb (88.9 kg) Height: 5' 4\" (1.626 m) Objective: I 
 
 
NEUROLOGICAL EXAM: 
 
Appearance: The patient is well developed, well nourished, provides a coherent history and is in no acute distress. Mental Status: Oriented to time, place and person, and the president. Mood and affect appropriate. Speech is fluent without aphasia or dysarthria Cranial Nerves:   Intact visual fields. Fundi show bilateral optic pallor, right side greater than left , with mild optic atrophy seen bilaterally, but no vascular lesions of the retina seen. . Visual acuity is difficult because the patient has marked presbyopia. Paulo GRAJEDA, EOM's full, no nystagmus, no ptosis. Facial sensation is normal. Corneal reflexes are not tested. Facial movement is symmetric. Hearing is normal bilaterally. Palate is midline with normal sternocleidomastoid and trapezius muscles are normal. Tongue is midline. Neck is supple without meningismus or bruits. Temporal arteries are not tender or enlarged Motor:  4/5 strength in upper and lower proximal and distal muscles, but the right lower extremity shows strength about 4/5, and he has decreased rapid alternating movement in his right leg, and a trace in his right hand with slight weakness in the right upper extremity. Normal bulk and increased tone. No fasciculations. Rapid alternate movement is slightly slow bilaterally Reflexes:   Deep tendon reflexes 2+/4 and symmetrical. 
No babinski or clonus present Straight leg raising test is negative in the sitting position bilaterally He has some mild percussion tenderness over the lower lumbar spine Sensory:   Normal to touch, pinprick and vibration and temperature . Gait:  Abnormal gait, the patient has slight stiffness in his legs and slightly clumsy in his walking, particular on the right side which has a spastic right hemiparesis, and he tends to stumble occasionally on the right leg. Tremor:   Mild bilateral intention tremor noted right greater than left. Cerebellar:   Mildly abnormal Romberg and tandem cerebellar signs present. Neurovascular:  Normal heart sounds and regular rhythm, peripheral pulses decreased, and no carotid bruits. Assessment: ICD-10-CM ICD-9-CM 1. [facility] G35 340 CBC WITH AUTOMATED DIFF  
   METABOLIC PANEL, COMPREHENSIVE QUANTIFERON-TB GOLD PLUS  
   teriflunomide (AUBAGIO) 14 mg tab 2. Lumbar back pain with radiculopathy affecting right lower extremity M54.16 724.4 CBC WITH AUTOMATED DIFF  
   METABOLIC PANEL, COMPREHENSIVE QUANTIFERON-TB GOLD PLUS  
   teriflunomide (AUBAGIO) 14 mg tab 3. Benign essential tremor G25.0 333.1 CBC WITH AUTOMATED DIFF  
   METABOLIC PANEL, COMPREHENSIVE QUANTIFERON-TB GOLD PLUS  
   teriflunomide (AUBAGIO) 14 mg tab 4. 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  
   METABOLIC PANEL, COMPREHENSIVE QUANTIFERON-TB GOLD PLUS  
   teriflunomide (AUBAGIO) 14 mg tab Plan:  
 
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 We also did complete metabolic studies to rule out other causes of his symptoms, and they were okay We sent him to physical therapy for gait training and strengthening without much improvement We will switch him to [employer] And benefits of medication explained to the wife and patient in detail and other medications all explained also, they elected to try Aubagio 
 because of the progression of his disease, with worsening bladder function, worsening falls, worsening cognitive issues, worsening fatigue and stamina, and his blood work was also ordered for today for monitoring and baseline tests. Patient sciatica is much better with exercise program, at this time no further treatment needed. Patient with refractory erectile dysfunction, will try to increase his sialoliths as tolerated. His neurogenic bladder was better on the Detrol, but he discontinued it because it did not seem to help and does not want any new medication and does not want to see a urologist 
Patient has neurogenic bladder secondary to his MS, most likely a spastic bladder with urge incontinence Routine metabolic parameters also checked to rule out side effects of his therapy on Avonex Patient's essential tremor doesn't seem significant amount to treat yet, we will follow him he will call if any problems We discussed other disease modifying drugs in view of his mild progression on his MRI scan, but the patient wants to continue his current therapy He is encouraged to make sure that he take a multivitamin vitamin D on a regular basis, and remained medically and physically active. Followup in 6 months time or earlier if needed Signed By: Tessa Farnsworth MD   
 November 19, 2018 This note will not be viewable in [address]

## 2018-11-19 NOTE — LETTER
11/19/2018 3:04 PM 
 
Patient:  Suhail Anthony YOB: 1965 Date of Visit: 11/19/2018 Dear No Recipients: Thank you for referring Mr. Suhail Anthony to me for evaluation/treatment. Below are the relevant portions of my assessment and plan of care. Consult Subjective:  
 
Suhail Anthony is a 52 y.o. Right-handed Afro-American male seen for evaluation at the request of Dr. Marco Antonio Lemon of a new problem of recent abnormal MRI scan of the brain and cervical spine showing 1 or 2 new lesions in the brain in 2 or 3 new lesions in the spine that were not enhancing, but change in the brain from 2014, and change in the spine from 2010. He is on Avonex once a week, never to change therapy other than that. He is having more trouble with his balance coordination and walking, having more falls, more neurogenic bladder symptoms, repeated urinary tract infections, more generalized fatigue, and feels he can no longer work because he wears himself out, cannot get up in the morning get to work and time because he is so tired and fatigued, having some more memory difficulty and concentration difficulty handling his job at work. I suggested we might need to change therapy he might want to switch over to 1 of the new oral agents or other injectable therapies like Rebif. After a long discussion with the patient and his wife, they agreed to try Aubagio, and start a form filled out for the patient today and lab studies also ordered for the patient to get monthly blood checks for 6 months, and get a QuantiFERON and blood test done prior to starting therapy either today or tomorrow. Patient was seen for increasing unsteadiness in walking, being more clumsy, no longer able to move fast, and feeling that his right side may be a little bit worse as far as his weakness and numbness for the last several weeks.    His back pain is gotten much better with physical therapy and treatment with orthopedist that he no longer has a problem with that. He has problem with neurogenic bladder, but the Detrol 4 mg LA  we gave him for frequent urination during the daytime helped initially but no longer seems to help him so he stopped the medication. He does not take Cialis for his erectile dysfunction anymore either. He has no difficulty with his bowel movements, but does have to get up to void 2 times a night. He has a slight tremor in his hands when he tries to hold things or grab things. It does not occur at rest and is not associated with gait abnormalities. He had optic neuritis in the right eye one year ago. His vision has improved and the eye pain is gone. His MRI scan showed a lesion in his right optic nerve that was acute, with stable slightly progressive white matter disease typical of his MS. He had no other new neurological symptoms. He does have erectile dysfunction, and had been given shot therapy by a urologist. He is on Avonex therapy once a week. He denies any side effect from the injections, other than a rare flulike episode. This is his first exacerbation in many years. He has had no other new focal weakness, visual problems, sensory loss, cognitive issues, gait problems, fevers or trauma or other new neurological symptoms. A complete review of systems and symptoms he's had no other new medical problems, complications or illnesses except for the erectile dysfunction and MS and tremor. Past Medical History:  
Diagnosis Date  Multiple sclerosis ([facility] [address] History reviewed. No pertinent surgical history. Family History Problem Relation Age of Onset  Hypertension Mother  Heart Disease Mother  High Cholesterol Mother  Arthritis-osteo Father  Hypertension Brother  Cancer Brother   
     lung  Hypertension Brother  Cancer Brother   
     prostate  Hypertension Sister  Other Sister   
     cerebral aneurysm Social History Tobacco Use  Smoking status: Never Smoker  Smokeless tobacco: Never Used Substance Use Topics  Alcohol use: No  
   
Current Outpatient Medications Medication Sig Dispense Refill  teriflunomide (AUBAGIO) 14 mg tab Take 14 mg by mouth daily (with dinner). 30 Tab 11  
 tadalafil (CIALIS) 20 mg tablet Take 1 Tab by mouth as needed. 6 Tab 11  
 naproxen sodium (ALEVE) 220 mg tablet Take 220 mg by mouth as directed. Take 30 minutes before Avonex injections.  multivitamins-minerals-lutein (CENTRUM SILVER) Tab Take  by mouth.  Cholecalciferol, Vitamin D3, (VITAMIN D3) 1,000 unit cap Take  by mouth. No Known Allergies Review of Systems: A comprehensive review of systems was negative except for: Eyes: positive for visual disturbance Genitourinary: positive for erectile dysfunction Musculoskeletal: positive for myalgias, arthralgias and stiff joints Neurological: positive for tremor and visual loss Vitals:  
 11/19/18 1302 BP: 144/88 Pulse: 70 SpO2: 97% Weight: 196 lb (88.9 kg) Height: 5' 4\" (1.626 m) Objective: I 
 
 
NEUROLOGICAL EXAM: 
 
Appearance: The patient is well developed, well nourished, provides a coherent history and is in no acute distress. Mental Status: Oriented to time, place and person, and the president. Mood and affect appropriate. Speech is fluent without aphasia or dysarthria Cranial Nerves:   Intact visual fields. Fundi show bilateral optic pallor, right side greater than left , with mild optic atrophy seen bilaterally, but no vascular lesions of the retina seen. . Visual acuity is difficult because the patient has marked presbyopia. Jaxson GRAJEDA, EOM's full, no nystagmus, no ptosis. Facial sensation is normal. Corneal reflexes are not tested. Facial movement is symmetric. Hearing is normal bilaterally. Palate is midline with normal sternocleidomastoid and trapezius muscles are normal. Tongue is midline. Neck is supple without meningismus or bruits. Temporal arteries are not tender or enlarged Motor:  4/5 strength in upper and lower proximal and distal muscles, but the right lower extremity shows strength about 4/5, and he has decreased rapid alternating movement in his right leg, and a trace in his right hand with slight weakness in the right upper extremity. Normal bulk and increased tone. No fasciculations. Rapid alternate movement is slightly slow bilaterally Reflexes:   Deep tendon reflexes 2+/4 and symmetrical. 
No babinski or clonus present Straight leg raising test is negative in the sitting position bilaterally He has some mild percussion tenderness over the lower lumbar spine Sensory:   Normal to touch, pinprick and vibration and temperature . Gait:  Abnormal gait, the patient has slight stiffness in his legs and slightly clumsy in his walking, particular on the right side which has a spastic right hemiparesis, and he tends to stumble occasionally on the right leg. Tremor:   Mild bilateral intention tremor noted right greater than left. Cerebellar:   Mildly abnormal Romberg and tandem cerebellar signs present. Neurovascular:  Normal heart sounds and regular rhythm, peripheral pulses decreased, and no carotid bruits. Assessment: ICD-10-CM ICD-9-CM 1. [facility] G35 340 CBC WITH AUTOMATED DIFF  
   METABOLIC PANEL, COMPREHENSIVE QUANTIFERON-TB GOLD PLUS  
   teriflunomide (AUBAGIO) 14 mg tab 2. Lumbar back pain with radiculopathy affecting right lower extremity M54.16 724.4 CBC WITH AUTOMATED DIFF  
   METABOLIC PANEL, COMPREHENSIVE QUANTIFERON-TB GOLD PLUS  
   teriflunomide (AUBAGIO) 14 mg tab 3. Benign essential tremor G25.0 333.1 CBC WITH AUTOMATED DIFF  
   METABOLIC PANEL, COMPREHENSIVE QUANTIFERON-TB GOLD PLUS  
   teriflunomide (AUBAGIO) 14 mg tab 4.  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  
 METABOLIC PANEL, COMPREHENSIVE QUANTIFERON-TB GOLD PLUS  
   teriflunomide (AUBAGIO) 14 mg tab Plan:  
 
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 We also did complete metabolic studies to rule out other causes of his symptoms, and they were okay We sent him to physical therapy for gait training and strengthening without much improvement We will switch him to [employer] And benefits of medication explained to the wife and patient in detail and other medications all explained also, they elected to try Aubagio 
 because of the progression of his disease, with worsening bladder function, worsening falls, worsening cognitive issues, worsening fatigue and stamina, and his blood work was also ordered for today for monitoring and baseline tests. Patient sciatica is much better with exercise program, at this time no further treatment needed. Patient with refractory erectile dysfunction, will try to increase his sialoliths as tolerated. His neurogenic bladder was better on the Detrol, but he discontinued it because it did not seem to help and does not want any new medication and does not want to see a urologist 
Patient has neurogenic bladder secondary to his MS, most likely a spastic bladder with urge incontinence Routine metabolic parameters also checked to rule out side effects of his therapy on Avonex Patient's essential tremor doesn't seem significant amount to treat yet, we will follow him he will call if any problems We discussed other disease modifying drugs in view of his mild progression on his MRI scan, but the patient wants to continue his current therapy He is encouraged to make sure that he take a multivitamin vitamin D on a regular basis, and remained medically and physically active. Followup in 6 months time or earlier if needed Signed By: Ramirez Amador MD   
 November 19, 2018 This note will not be viewable in [address] If you have questions, please do not hesitate to call me. I look forward to following Mr. Wendie Giraldo along with you. Sincerely, Ramirez Amador MD

## 2018-11-19 NOTE — PATIENT INSTRUCTIONS
A Healthy Lifestyle: Care Instructions Your Care Instructions A healthy lifestyle can help you feel good, stay at a healthy weight, and have plenty of energy for both work and play. A healthy lifestyle is something you can share with your whole family. A healthy lifestyle also can lower your risk for serious health problems, such as high blood pressure, heart disease, and diabetes. You can follow a few steps listed below to improve your health and the health of your family. Follow-up care is a key part of your treatment and safety. Be sure to make and go to all appointments, and call your doctor if you are having problems. It's also a good idea to know your test results and keep a list of the medicines you take. How can you care for yourself at home? · Do not eat too much sugar, fat, or fast foods. You can still have dessert and treats now and then. The goal is moderation. · Start small to improve your eating habits. Pay attention to portion sizes, drink less juice and soda pop, and eat more fruits and vegetables. ? Eat a healthy amount of food. A 3-ounce serving of meat, for example, is about the size of a deck of cards. Fill the rest of your plate with vegetables and whole grains. ? Limit the amount of soda and sports drinks you have every day. Drink more water when you are thirsty. ? Eat at least 5 servings of fruits and vegetables every day. It may seem like a lot, but it is not hard to reach this goal. A serving or helping is 1 piece of fruit, 1 cup of vegetables, or 2 cups of leafy, raw vegetables. Have an apple or some carrot sticks as an afternoon snack instead of a candy bar. Try to have fruits and/or vegetables at every meal. 
· Make exercise part of your daily routine. You may want to start with simple activities, such as walking, bicycling, or slow swimming. Try to be active 30 to 60 minutes every day.  You do not need to do all 30 to 60 minutes all at once. For example, you can exercise 3 times a day for 10 or 20 minutes. Moderate exercise is safe for most people, but it is always a good idea to talk to your doctor before starting an exercise program. 
· Keep moving. Edwena Rust the lawn, work in the garden, or [place]. Take the stairs instead of the elevator at work. · If you smoke, quit. People who smoke have an increased risk for heart attack, stroke, cancer, and other lung illnesses. Quitting is hard, but there are ways to boost your chance of quitting tobacco for good. ? Use nicotine gum, patches, or lozenges. ? Ask your doctor about stop-smoking programs and medicines. ? Keep trying. In addition to reducing your risk of diseases in the future, you will notice some benefits soon after you stop using tobacco. If you have shortness of breath or asthma symptoms, they will likely get better within a few weeks after you quit. · Limit how much alcohol you drink. Moderate amounts of alcohol (up to 2 drinks a day for men, 1 drink a day for women) are okay. But drinking too much can lead to liver problems, high blood pressure, and other health problems. Family health If you have a family, there are many things you can do together to improve your health. · Eat meals together as a family as often as possible. · Eat healthy foods. This includes fruits, vegetables, lean meats and dairy, and whole grains. · Include your family in your fitness plan. Most people think of activities such as jogging or tennis as the way to fitness, but there are many ways you and your family can be more active. Anything that makes you breathe hard and gets your heart pumping is exercise. Here are some tips: 
? Walk to do errands or to take your child to school or the bus. 
? Go for a family bike ride after dinner instead of watching TV. Where can you learn more? Go to http://puneet-estephania.info/. Enter J760 in the search box to learn more about \"A Healthy Lifestyle: Care Instructions. \" Current as of: December 7, 2017 Content Version: 11.8 © 9213-8628 Healthwise, [employer]. Care instructions adapted under license by [employer] (which disclaims liability or warranty for this information). If you have questions about a medical condition or this instruction, always ask your healthcare professional. [address] any warranty or liability for your use of this information. Office Policieso Phone calls/patient messages: Please allow up to 24 hours for someone in the office to contact you about your call or message. Be mindful your provider may be out of the office or your message may require further review. We encourage you to use [employer] for your messages as this is a faster, more efficient way to communicate with our office 
o Medication Refills: 
Prescription medications require up to 48 business hours to process. We encourage you to use [employer] for your refills. For controlled medications: Please allow up to 72 business hours to process. Certain medications may require you to  a written prescription at our office. NO narcotic/controlled medications will be prescribed after 4pm Monday through Friday or on weekends 
o Form/Paperwork Completion: 
Please note there is a $25 fee for all paperwork completed by our providers. We ask that you allow 7-14 business days. Pre-payment is due prior to picking up/faxing the completed form. You may also download your forms to [employer] to have your doctor print off. Effective December 28,2018, we will be moving across the [facility] to: 
[address] [place] [address] [address] [place] [address], [address] Our phone number will remain the same at [phone]

## 2018-11-26 ENCOUNTER — TELEPHONE (OUTPATIENT)
Dept: NEUROLOGY | Age: 53
End: 2018-11-26

## 2018-11-26 LAB
ALBUMIN SERPL-MCNC: 4.5 G/DL (ref 3.5–5.5)
ALBUMIN/GLOB SERPL: 1.7 {RATIO} (ref 1.2–2.2)
ALP SERPL-CCNC: 74 IU/L (ref 39–117)
ALT SERPL-CCNC: 26 IU/L (ref 0–44)
AST SERPL-CCNC: 27 IU/L (ref 0–40)
BASOPHILS # BLD AUTO: 0 X10E3/UL (ref 0–0.2)
BASOPHILS NFR BLD AUTO: 0 %
BILIRUB SERPL-MCNC: 0.5 MG/DL (ref 0–1.2)
BUN SERPL-MCNC: 23 MG/DL (ref 6–24)
BUN/CREAT SERPL: 24 (ref 9–20)
CALCIUM SERPL-MCNC: 9.9 MG/DL (ref 8.7–10.2)
CHLORIDE SERPL-SCNC: 104 MMOL/L (ref 96–106)
CO2 SERPL-SCNC: 26 MMOL/L (ref 20–29)
CREAT SERPL-MCNC: 0.94 MG/DL (ref 0.76–1.27)
EOSINOPHIL # BLD AUTO: 0.1 X10E3/UL (ref 0–0.4)
EOSINOPHIL NFR BLD AUTO: 1 %
ERYTHROCYTE [DISTWIDTH] IN BLOOD BY AUTOMATED COUNT: 13.3 % (ref 12.3–15.4)
GAMMA INTERFERON BACKGROUND BLD IA-ACNC: 0.02 IU/ML
GLOBULIN SER CALC-MCNC: 2.7 G/DL (ref 1.5–4.5)
GLUCOSE SERPL-MCNC: 90 MG/DL (ref 65–99)
HCT VFR BLD AUTO: 45.2 % (ref 37.5–51)
HGB BLD-MCNC: 15.5 G/DL (ref 13–17.7)
IMM GRANULOCYTES # BLD: 0 X10E3/UL (ref 0–0.1)
IMM GRANULOCYTES NFR BLD: 0 %
LYMPHOCYTES # BLD AUTO: 2.1 X10E3/UL (ref 0.7–3.1)
LYMPHOCYTES NFR BLD AUTO: 26 %
M TB IFN-G BLD-IMP: NEGATIVE
M TB IFN-G CD4+ BCKGRND COR BLD-ACNC: 0.02 IU/ML
MCH RBC QN AUTO: 31.2 PG (ref 26.6–33)
MCHC RBC AUTO-ENTMCNC: 34.3 G/DL (ref 31.5–35.7)
MCV RBC AUTO: 91 FL (ref 79–97)
MITOGEN IGNF BLD-ACNC: >10 IU/ML
MONOCYTES # BLD AUTO: 0.6 X10E3/UL (ref 0.1–0.9)
MONOCYTES NFR BLD AUTO: 7 %
NEUTROPHILS # BLD AUTO: 5.3 X10E3/UL (ref 1.4–7)
NEUTROPHILS NFR BLD AUTO: 66 %
PLATELET # BLD AUTO: 347 X10E3/UL (ref 150–379)
POTASSIUM SERPL-SCNC: 4.7 MMOL/L (ref 3.5–5.2)
PROT SERPL-MCNC: 7.2 G/DL (ref 6–8.5)
QUANTIFERON INCUBATION, QF1T: NORMAL
QUANTIFERON TB2 AG: 0.02 IU/ML
RBC # BLD AUTO: 4.97 X10E6/UL (ref 4.14–5.8)
SERVICE CMNT-IMP: NORMAL
SODIUM SERPL-SCNC: 144 MMOL/L (ref 134–144)
WBC # BLD AUTO: 8 X10E3/UL (ref 3.4–10.8)

## 2018-11-26 NOTE — TELEPHONE ENCOUNTER
----- Message from Laly Eaton sent at 11/26/2018 10:47 AM EST -----  Regarding: Dr. Christopher Keen  The patient is letting the doctor know that he had blood work done.  ([phone]

## 2018-11-27 ENCOUNTER — TELEPHONE (OUTPATIENT)
Dept: NEUROLOGY | Age: 53
End: 2018-11-27

## 2018-11-27 NOTE — TELEPHONE ENCOUNTER
----- Message from Jennifer Connell sent at 11/27/2018 10:17 AM EST -----  Regarding: Dr. Yanet Blackwell  The patient is requesting the doctor or nurse calls him back in regards to a paper being sent to the facility that he had his test done last Friday.  (o)[phone]

## 2018-11-27 NOTE — TELEPHONE ENCOUNTER
Patient has not received the medication as of yet.  He will call me back as soon as he starts the medication so that I can send a lab slip to his home for the month after that

## 2018-12-06 ENCOUNTER — TELEPHONE (OUTPATIENT)
Dept: NEUROLOGY | Age: 53
End: 2018-12-06

## 2018-12-06 NOTE — TELEPHONE ENCOUNTER
Set up PA for Lizabeth cornejo/ [facility] - sent notes via CM. Ryan EVANS Case ID: [id number]  Status pending.

## 2018-12-07 ENCOUNTER — TELEPHONE (OUTPATIENT)
Dept: NEUROLOGY | Age: 53
End: 2018-12-07

## 2018-12-07 DIAGNOSIS — G35 MULTIPLE SCLEROSIS (HCC): Primary | ICD-10-CM

## 2018-12-07 NOTE — TELEPHONE ENCOUNTER
I called the patient's wife and notified that per Dr Monae Spine, he is ok to start the Aubagio.     Will send out lab slips next week in the mail

## 2018-12-07 NOTE — TELEPHONE ENCOUNTER
Aubagio approval from [facility] to 12/6/2020.  Faxed to [email or web address] and emailed [facility].

## 2018-12-10 ENCOUNTER — TELEPHONE (OUTPATIENT)
Dept: NEUROLOGY | Age: 53
End: 2018-12-10

## 2018-12-10 DIAGNOSIS — G25.0 BENIGN ESSENTIAL TREMOR: ICD-10-CM

## 2018-12-10 DIAGNOSIS — N31.8 SPASTIC NEUROGENIC BLADDERS: ICD-10-CM

## 2018-12-10 DIAGNOSIS — M54.16 LUMBAR BACK PAIN WITH RADICULOPATHY AFFECTING RIGHT LOWER EXTREMITY: ICD-10-CM

## 2018-12-10 DIAGNOSIS — G35 MULTIPLE SCLEROSIS (HCC): ICD-10-CM

## 2018-12-10 NOTE — TELEPHONE ENCOUNTER
----- Message from Jael Quiroga sent at 12/10/2018  8:31 AM EST -----  Regarding: Dr. Armand Mitchell is calling regarding pt's \"Aubagio\" Best contact is [phone] fax is [phone]. Stated it has been over a week. Call pt to update status as well.

## 2018-12-11 NOTE — TELEPHONE ENCOUNTER
They just did not get the script.  The patient received his first month of medication through [facility].  escribed as per Dr Desmond Javier orders and in chart

## 2018-12-12 ENCOUNTER — TELEPHONE (OUTPATIENT)
Dept: NEUROLOGY | Age: 53
End: 2018-12-12

## 2018-12-12 NOTE — TELEPHONE ENCOUNTER
Received a phone call from the patient and he stated that since he has been taking the [place] he has been having some swelling in his mid section and he said he is having some pain. Patient stated he has only taken one dose. Advised to the patient if he continues to get worse to go to the ED to be evaluated however will send this information to Dr. Nicole Holguin for review.

## 2018-12-12 NOTE — TELEPHONE ENCOUNTER
----- Message from Deedee Martinez sent at 12/12/2018  2:24 PM EST -----  Regarding: Dr. Kody Ely  Pt stated his medication(\"for his MS\") was changed, and would like a call from the doctor today because he is having pain on the (L) side. Pt stated he left a message this morning, but have not heard back. Best contact number [phone].

## 2018-12-12 NOTE — TELEPHONE ENCOUNTER
When patient called this morning, the nurse that took the call asked him to go to the hospital to have a work up as this is urgent in matter. There is a message sent to Dr Jennifer Damon, but not to wait for a call back in order to have a work up.   I left a message of this for the patient to go to the ER in order to be urgently seen

## 2018-12-21 ENCOUNTER — TELEPHONE (OUTPATIENT)
Dept: NEUROLOGY | Age: 53
End: 2018-12-21

## 2018-12-21 NOTE — TELEPHONE ENCOUNTER
----- Message from Adolm Apgar sent at 2018  8:29 AM EST -----  Regarding: Dr. Lili Tracey  Mrs. Meghann Powers pt's spouse, is calling regarding paperwork for pt's labs sent to address on file. [phone].

## 2018-12-27 LAB
ALBUMIN SERPL-MCNC: 4.1 G/DL (ref 3.5–5.5)
ALBUMIN/GLOB SERPL: 1.5 {RATIO} (ref 1.2–2.2)
ALP SERPL-CCNC: 72 IU/L (ref 39–117)
ALT SERPL-CCNC: 26 IU/L (ref 0–44)
AST SERPL-CCNC: 27 IU/L (ref 0–40)
BASOPHILS # BLD AUTO: 0 X10E3/UL (ref 0–0.2)
BASOPHILS NFR BLD AUTO: 0 %
BILIRUB SERPL-MCNC: 0.2 MG/DL (ref 0–1.2)
BUN SERPL-MCNC: 17 MG/DL (ref 6–24)
BUN/CREAT SERPL: 20 (ref 9–20)
CALCIUM SERPL-MCNC: 9.4 MG/DL (ref 8.7–10.2)
CHLORIDE SERPL-SCNC: 105 MMOL/L (ref 96–106)
CO2 SERPL-SCNC: 22 MMOL/L (ref 20–29)
CREAT SERPL-MCNC: 0.86 MG/DL (ref 0.76–1.27)
EOSINOPHIL # BLD AUTO: 0.1 X10E3/UL (ref 0–0.4)
EOSINOPHIL NFR BLD AUTO: 1 %
ERYTHROCYTE [DISTWIDTH] IN BLOOD BY AUTOMATED COUNT: 13.6 % (ref 12.3–15.4)
GLOBULIN SER CALC-MCNC: 2.7 G/DL (ref 1.5–4.5)
GLUCOSE SERPL-MCNC: 117 MG/DL (ref 65–99)
HCT VFR BLD AUTO: 43 % (ref 37.5–51)
HGB BLD-MCNC: 14.8 G/DL (ref 13–17.7)
IMM GRANULOCYTES # BLD: 0 X10E3/UL (ref 0–0.1)
IMM GRANULOCYTES NFR BLD: 0 %
LYMPHOCYTES # BLD AUTO: 1.6 X10E3/UL (ref 0.7–3.1)
LYMPHOCYTES NFR BLD AUTO: 16 %
MCH RBC QN AUTO: 30.8 PG (ref 26.6–33)
MCHC RBC AUTO-ENTMCNC: 34.4 G/DL (ref 31.5–35.7)
MCV RBC AUTO: 89 FL (ref 79–97)
MONOCYTES # BLD AUTO: 0.8 X10E3/UL (ref 0.1–0.9)
MONOCYTES NFR BLD AUTO: 8 %
NEUTROPHILS # BLD AUTO: 7.9 X10E3/UL (ref 1.4–7)
NEUTROPHILS NFR BLD AUTO: 75 %
PLATELET # BLD AUTO: 439 X10E3/UL (ref 150–379)
POTASSIUM SERPL-SCNC: 4.2 MMOL/L (ref 3.5–5.2)
PROT SERPL-MCNC: 6.8 G/DL (ref 6–8.5)
RBC # BLD AUTO: 4.81 X10E6/UL (ref 4.14–5.8)
SODIUM SERPL-SCNC: 142 MMOL/L (ref 134–144)
WBC # BLD AUTO: 10.4 X10E3/UL (ref 3.4–10.8)

## 2019-01-22 LAB
ALBUMIN SERPL-MCNC: 4.4 G/DL (ref 3.5–5.5)
ALBUMIN/GLOB SERPL: 1.4 {RATIO} (ref 1.2–2.2)
ALP SERPL-CCNC: 74 IU/L (ref 39–117)
ALT SERPL-CCNC: 24 IU/L (ref 0–44)
AST SERPL-CCNC: 24 IU/L (ref 0–40)
BASOPHILS # BLD AUTO: 0 X10E3/UL (ref 0–0.2)
BASOPHILS NFR BLD AUTO: 1 %
BILIRUB SERPL-MCNC: 0.3 MG/DL (ref 0–1.2)
BUN SERPL-MCNC: 15 MG/DL (ref 6–24)
BUN/CREAT SERPL: 19 (ref 9–20)
CALCIUM SERPL-MCNC: 9.6 MG/DL (ref 8.7–10.2)
CHLORIDE SERPL-SCNC: 104 MMOL/L (ref 96–106)
CO2 SERPL-SCNC: 22 MMOL/L (ref 20–29)
CREAT SERPL-MCNC: 0.79 MG/DL (ref 0.76–1.27)
EOSINOPHIL # BLD AUTO: 0.1 X10E3/UL (ref 0–0.4)
EOSINOPHIL NFR BLD AUTO: 1 %
ERYTHROCYTE [DISTWIDTH] IN BLOOD BY AUTOMATED COUNT: 13.2 % (ref 12.3–15.4)
GLOBULIN SER CALC-MCNC: 3.2 G/DL (ref 1.5–4.5)
GLUCOSE SERPL-MCNC: 108 MG/DL (ref 65–99)
HCT VFR BLD AUTO: 45.3 % (ref 37.5–51)
HGB BLD-MCNC: 14.8 G/DL (ref 13–17.7)
IMM GRANULOCYTES # BLD AUTO: 0 X10E3/UL (ref 0–0.1)
IMM GRANULOCYTES NFR BLD AUTO: 0 %
LYMPHOCYTES # BLD AUTO: 1.4 X10E3/UL (ref 0.7–3.1)
LYMPHOCYTES NFR BLD AUTO: 17 %
MCH RBC QN AUTO: 30.5 PG (ref 26.6–33)
MCHC RBC AUTO-ENTMCNC: 32.7 G/DL (ref 31.5–35.7)
MCV RBC AUTO: 93 FL (ref 79–97)
MONOCYTES # BLD AUTO: 0.9 X10E3/UL (ref 0.1–0.9)
MONOCYTES NFR BLD AUTO: 11 %
NEUTROPHILS # BLD AUTO: 5.5 X10E3/UL (ref 1.4–7)
NEUTROPHILS NFR BLD AUTO: 70 %
PLATELET # BLD AUTO: 423 X10E3/UL (ref 150–379)
POTASSIUM SERPL-SCNC: 4.1 MMOL/L (ref 3.5–5.2)
PROT SERPL-MCNC: 7.6 G/DL (ref 6–8.5)
RBC # BLD AUTO: 4.85 X10E6/UL (ref 4.14–5.8)
SODIUM SERPL-SCNC: 143 MMOL/L (ref 134–144)
WBC # BLD AUTO: 7.9 X10E3/UL (ref 3.4–10.8)

## 2019-02-19 LAB
ALBUMIN SERPL-MCNC: 4.2 G/DL (ref 3.5–5.5)
ALBUMIN/GLOB SERPL: 1.6 {RATIO} (ref 1.2–2.2)
ALP SERPL-CCNC: 74 IU/L (ref 39–117)
ALT SERPL-CCNC: 24 IU/L (ref 0–44)
AST SERPL-CCNC: 28 IU/L (ref 0–40)
BASOPHILS # BLD AUTO: 0 X10E3/UL (ref 0–0.2)
BASOPHILS NFR BLD AUTO: 0 %
BILIRUB SERPL-MCNC: 0.4 MG/DL (ref 0–1.2)
BUN SERPL-MCNC: 15 MG/DL (ref 6–24)
BUN/CREAT SERPL: 19 (ref 9–20)
CALCIUM SERPL-MCNC: 9.4 MG/DL (ref 8.7–10.2)
CHLORIDE SERPL-SCNC: 106 MMOL/L (ref 96–106)
CO2 SERPL-SCNC: 23 MMOL/L (ref 20–29)
CREAT SERPL-MCNC: 0.77 MG/DL (ref 0.76–1.27)
EOSINOPHIL # BLD AUTO: 0.1 X10E3/UL (ref 0–0.4)
EOSINOPHIL NFR BLD AUTO: 2 %
ERYTHROCYTE [DISTWIDTH] IN BLOOD BY AUTOMATED COUNT: 13.5 % (ref 12.3–15.4)
GLOBULIN SER CALC-MCNC: 2.7 G/DL (ref 1.5–4.5)
GLUCOSE SERPL-MCNC: 91 MG/DL (ref 65–99)
HCT VFR BLD AUTO: 46.2 % (ref 37.5–51)
HGB BLD-MCNC: 14.9 G/DL (ref 13–17.7)
IMM GRANULOCYTES # BLD AUTO: 0 X10E3/UL (ref 0–0.1)
IMM GRANULOCYTES NFR BLD AUTO: 0 %
LYMPHOCYTES # BLD AUTO: 1.6 X10E3/UL (ref 0.7–3.1)
LYMPHOCYTES NFR BLD AUTO: 25 %
MCH RBC QN AUTO: 29.9 PG (ref 26.6–33)
MCHC RBC AUTO-ENTMCNC: 32.3 G/DL (ref 31.5–35.7)
MCV RBC AUTO: 93 FL (ref 79–97)
MONOCYTES # BLD AUTO: 1 X10E3/UL (ref 0.1–0.9)
MONOCYTES NFR BLD AUTO: 15 %
NEUTROPHILS # BLD AUTO: 3.8 X10E3/UL (ref 1.4–7)
NEUTROPHILS NFR BLD AUTO: 58 %
PLATELET # BLD AUTO: 290 X10E3/UL (ref 150–379)
POTASSIUM SERPL-SCNC: 3.9 MMOL/L (ref 3.5–5.2)
PROT SERPL-MCNC: 6.9 G/DL (ref 6–8.5)
RBC # BLD AUTO: 4.98 X10E6/UL (ref 4.14–5.8)
SODIUM SERPL-SCNC: 144 MMOL/L (ref 134–144)
WBC # BLD AUTO: 6.6 X10E3/UL (ref 3.4–10.8)

## 2019-02-20 ENCOUNTER — TELEPHONE (OUTPATIENT)
Dept: NEUROLOGY | Age: 54
End: 2019-02-20

## 2019-02-20 DIAGNOSIS — G35 MULTIPLE SCLEROSIS (HCC): Primary | ICD-10-CM

## 2019-02-20 NOTE — TELEPHONE ENCOUNTER
I called the patient and notified that he is to get CBC and CMP very month for 6 months from starting Aubagio 12/6/18. He got them done 1/21/19, 2/18/19. He does not have any more lab slips.  Notified I will send out to him

## 2019-02-20 NOTE — TELEPHONE ENCOUNTER
Received call from patient, he stated that Dr. Angie Lew changed one of his medications, and he wanted to know if he needed to come back in to \"get blood drawn\"      He would like a call back    [phone]

## 2019-03-15 ENCOUNTER — TELEPHONE (OUTPATIENT)
Dept: NEUROLOGY | Age: 54
End: 2019-03-15

## 2019-03-15 NOTE — TELEPHONE ENCOUNTER
----- Message from [facility] sent at 3/15/2019 10:24 AM EDT -----  Regarding: Dr. Diogo Adorno  Contact: [phone]  Pt would like some mild sleeping pills because he is having a hard time sleeping. Pt would like the script sent to [facility] in [address]?

## 2019-03-21 ENCOUNTER — TELEPHONE (OUTPATIENT)
Dept: NEUROLOGY | Age: 54
End: 2019-03-21

## 2019-03-21 NOTE — TELEPHONE ENCOUNTER
I called and notified that I will put on the cancellation list for Dr Patti Rivera as we will need to see him to discuss sleep meds

## 2019-03-21 NOTE — TELEPHONE ENCOUNTER
----- Message from Gene Herron sent at 3/21/2019 11:57 AM EDT -----  Regarding: Dr. Luna/Telelohone  Pt stated he received a call to schedule an appt for a f/up. No appts available until November.   Best contact number [phone]

## 2019-03-25 ENCOUNTER — TELEPHONE (OUTPATIENT)
Dept: NEUROLOGY | Age: 54
End: 2019-03-25

## 2019-03-25 NOTE — TELEPHONE ENCOUNTER
I called the patient and notified him again that he would need to be seen, but he also could speak to his PCP about this.  Because this came up before his Aubagio medication, it should not be from the medication and should be discussed first with primary care

## 2019-03-25 NOTE — TELEPHONE ENCOUNTER
Received call from patient, he stated that he is having trouble sleeping at night and wants to know if Dr. Ilene Clemons send some sleep medicine to [facility].       [phone]

## 2019-04-16 ENCOUNTER — TELEPHONE (OUTPATIENT)
Dept: NEUROLOGY | Age: 54
End: 2019-04-16

## 2019-04-16 NOTE — TELEPHONE ENCOUNTER
----- Message from Tor Self sent at 4/16/2019 12:42 PM EDT -----  Regarding: Dr. Ariana Joaquin: [phone]  Pt requesting a call back concerning how many blood simples he has left to take.

## 2019-04-18 NOTE — TELEPHONE ENCOUNTER
I called and left a message that per 2/20/19 phone call conversation, he is to get labs once every month for 6 months from starting Aubagio 12/2018. As long as he got his blood work done March and April, he will get another in May and June. I called the other number that was given to me and spoke with the patient.  I notified him of above information

## 2019-04-19 LAB
ALBUMIN SERPL-MCNC: 4.1 G/DL (ref 3.5–5.5)
ALBUMIN/GLOB SERPL: 1.7 {RATIO} (ref 1.2–2.2)
ALP SERPL-CCNC: 79 IU/L (ref 39–117)
ALT SERPL-CCNC: 18 IU/L (ref 0–44)
AST SERPL-CCNC: 25 IU/L (ref 0–40)
BASOPHILS # BLD AUTO: 0 X10E3/UL (ref 0–0.2)
BASOPHILS NFR BLD AUTO: 0 %
BILIRUB SERPL-MCNC: 0.4 MG/DL (ref 0–1.2)
BUN SERPL-MCNC: 22 MG/DL (ref 6–24)
BUN/CREAT SERPL: 27 (ref 9–20)
CALCIUM SERPL-MCNC: 9.2 MG/DL (ref 8.7–10.2)
CHLORIDE SERPL-SCNC: 108 MMOL/L (ref 96–106)
CO2 SERPL-SCNC: 22 MMOL/L (ref 20–29)
CREAT SERPL-MCNC: 0.81 MG/DL (ref 0.76–1.27)
EOSINOPHIL # BLD AUTO: 0.1 X10E3/UL (ref 0–0.4)
EOSINOPHIL NFR BLD AUTO: 1 %
ERYTHROCYTE [DISTWIDTH] IN BLOOD BY AUTOMATED COUNT: 13.9 % (ref 12.3–15.4)
GLOBULIN SER CALC-MCNC: 2.4 G/DL (ref 1.5–4.5)
GLUCOSE SERPL-MCNC: 136 MG/DL (ref 65–99)
HCT VFR BLD AUTO: 46.6 % (ref 37.5–51)
HGB BLD-MCNC: 15.2 G/DL (ref 13–17.7)
IMM GRANULOCYTES # BLD AUTO: 0 X10E3/UL (ref 0–0.1)
IMM GRANULOCYTES NFR BLD AUTO: 0 %
LYMPHOCYTES # BLD AUTO: 2 X10E3/UL (ref 0.7–3.1)
LYMPHOCYTES NFR BLD AUTO: 22 %
MCH RBC QN AUTO: 29.9 PG (ref 26.6–33)
MCHC RBC AUTO-ENTMCNC: 32.6 G/DL (ref 31.5–35.7)
MCV RBC AUTO: 92 FL (ref 79–97)
MONOCYTES # BLD AUTO: 1.1 X10E3/UL (ref 0.1–0.9)
MONOCYTES NFR BLD AUTO: 12 %
NEUTROPHILS # BLD AUTO: 6 X10E3/UL (ref 1.4–7)
NEUTROPHILS NFR BLD AUTO: 65 %
PLATELET # BLD AUTO: 275 X10E3/UL (ref 150–379)
POTASSIUM SERPL-SCNC: 3.7 MMOL/L (ref 3.5–5.2)
PROT SERPL-MCNC: 6.5 G/DL (ref 6–8.5)
RBC # BLD AUTO: 5.08 X10E6/UL (ref 4.14–5.8)
SODIUM SERPL-SCNC: 143 MMOL/L (ref 134–144)
WBC # BLD AUTO: 9.3 X10E3/UL (ref 3.4–10.8)

## 2019-04-23 DIAGNOSIS — E55.9 VITAMIN D DEFICIENCY: ICD-10-CM

## 2019-04-23 DIAGNOSIS — K59.00 CONSTIPATION DUE TO NEUROGENIC BOWEL: ICD-10-CM

## 2019-04-23 DIAGNOSIS — G35 MULTIPLE SCLEROSIS (HCC): ICD-10-CM

## 2019-04-23 DIAGNOSIS — G25.0 BENIGN ESSENTIAL TREMOR: ICD-10-CM

## 2019-04-23 DIAGNOSIS — N31.8 SPASTIC NEUROGENIC BLADDERS: ICD-10-CM

## 2019-04-23 DIAGNOSIS — N52.1 ERECTILE DYSFUNCTION DUE TO DISEASES CLASSIFIED ELSEWHERE: ICD-10-CM

## 2019-04-23 DIAGNOSIS — K59.2 CONSTIPATION DUE TO NEUROGENIC BOWEL: ICD-10-CM

## 2019-04-23 RX ORDER — TADALAFIL 20 MG/1
20 TABLET ORAL
Qty: 6 TAB | Refills: 11 | Status: SHIPPED | OUTPATIENT
Start: 2019-04-23 | End: 2019-04-24 | Stop reason: SDUPTHER

## 2019-04-23 NOTE — TELEPHONE ENCOUNTER
Future Appointments   Date Time Provider Jimmy Ayers   11/5/2019  3:40 PM MD IRENA Velasquez/ [address]                         Last Appointment My Department:  11/19/2018    Please advise of refill below. Requested Prescriptions     Pending Prescriptions Disp Refills    tadalafil (CIALIS) 20 mg tablet 6 Tab 11     Sig: Take 1 Tab by mouth daily as needed ([facility]).

## 2019-04-24 RX ORDER — TADALAFIL 20 MG/1
20 TABLET ORAL
Qty: 6 TAB | Refills: 11 | Status: SHIPPED | OUTPATIENT
Start: 2019-04-24 | End: 2022-03-09 | Stop reason: ALTCHOICE

## 2019-08-20 ENCOUNTER — TELEPHONE (OUTPATIENT)
Dept: NEUROLOGY | Age: 54
End: 2019-08-20

## 2019-08-20 DIAGNOSIS — M54.16 LUMBAR BACK PAIN WITH RADICULOPATHY AFFECTING RIGHT LOWER EXTREMITY: ICD-10-CM

## 2019-08-20 DIAGNOSIS — G35 MULTIPLE SCLEROSIS (HCC): ICD-10-CM

## 2019-08-20 DIAGNOSIS — G25.0 BENIGN ESSENTIAL TREMOR: ICD-10-CM

## 2019-08-20 DIAGNOSIS — N31.8 SPASTIC NEUROGENIC BLADDERS: ICD-10-CM

## 2019-08-22 ENCOUNTER — TELEPHONE (OUTPATIENT)
Dept: NEUROLOGY | Age: 54
End: 2019-08-22

## 2019-08-27 ENCOUNTER — TELEPHONE (OUTPATIENT)
Dept: NEUROLOGY | Age: 54
End: 2019-08-27

## 2019-10-23 ENCOUNTER — HOSPITAL ENCOUNTER (OUTPATIENT)
Dept: CT IMAGING | Age: 54
Discharge: HOME | End: 2019-10-23
Attending: SURGERY
Payer: COMMERCIAL

## 2019-10-23 DIAGNOSIS — M79.89 RIGHT LEG SWELLING: ICD-10-CM

## 2019-10-23 PROCEDURE — 74011636320 HC RX REV CODE- 636/320: Performed by: SURGERY

## 2019-10-23 PROCEDURE — 74174 CTA ABD&PLVS W/CONTRAST: CPT

## 2019-10-23 RX ORDER — SODIUM CHLORIDE 0.9 % (FLUSH) 0.9 %
10 SYRINGE (ML) INJECTION
Status: COMPLETED | OUTPATIENT
Start: 2019-10-23 | End: 2019-10-23

## 2019-10-23 RX ADMIN — IOPAMIDOL 100 ML: 755 INJECTION, SOLUTION INTRAVENOUS at 07:05

## 2019-10-23 RX ADMIN — Medication 10 ML: at 07:06

## 2019-10-31 ENCOUNTER — TELEPHONE (OUTPATIENT)
Dept: NEUROLOGY | Age: 54
End: 2019-10-31

## 2019-11-01 ENCOUNTER — OFFICE VISIT (OUTPATIENT)
Dept: NEUROLOGY | Age: 54
End: 2019-11-01

## 2019-11-01 VITALS
BODY MASS INDEX: 30.73 KG/M2 | RESPIRATION RATE: 12 BRPM | HEART RATE: 72 BPM | OXYGEN SATURATION: 97 % | SYSTOLIC BLOOD PRESSURE: 142 MMHG | DIASTOLIC BLOOD PRESSURE: 90 MMHG | WEIGHT: 180 LBS | HEIGHT: 64 IN

## 2019-11-01 DIAGNOSIS — G35 MS (MULTIPLE SCLEROSIS) (HCC): Primary | ICD-10-CM

## 2019-11-01 DIAGNOSIS — N31.8 SPASTIC NEUROGENIC BLADDERS: ICD-10-CM

## 2019-11-01 DIAGNOSIS — G35 MULTIPLE SCLEROSIS (HCC): ICD-10-CM

## 2019-11-01 DIAGNOSIS — M54.16 LUMBAR BACK PAIN WITH RADICULOPATHY AFFECTING RIGHT LOWER EXTREMITY: ICD-10-CM

## 2019-11-02 NOTE — PROGRESS NOTES
Consult    Subjective:     Leila Vargas is a 62 y.o. Right-handed Afro-American male seen for evaluation at the request of Dr. Monica Sarabia of a new problem of recent swelling in his right thigh, of unclear etiology but seems to have gotten better since he quit working for 2 weeks, and is getting a vascular examination and evaluation with Dr. Dejah Hernandez for this. Apparently the studies has not shown any blood clots in the leg, and he had a CTA of the pelvis that looks essentially normal by looking at the report, but he will see Dr. Dejah Hernandez for final interpretation. I advised the patient this is most likely not related to his MS because that is disease only of the brain spinal cord and optic nerves and the central nervous system only. Patient has been on Aubagio for the last 1 year and doing well, and not had any major recurrent neurological symptoms. 2 years ago patient had abnormal MRI scan of the brain and cervical spine showing 1 or 2 new lesions in the brain in 2 or 3 new lesions in the spine that were not enhancing, but change in the brain from 2014, and change in the spine from 2010. He was on Avonex once a week, and switched to Aubagio 1 year ago, and has remained stable since. Patient was seen for increasing unsteadiness in walking, being more clumsy, no longer able to move fast, and feeling that his right side may be a little bit worse as far as his weakness and numbness for the last several weeks. His back pain is gotten much better with physical therapy and treatment with orthopedist that he no longer has a problem with that. He has problem with neurogenic bladder, but the Detrol 4 mg LA  we gave him for frequent urination during the daytime helped initially but no longer seems to help him so he stopped the medication. He does not take Cialis for his erectile dysfunction anymore either. He has no difficulty with his bowel movements, but does have to get up to void 2 times a night.  He has a slight tremor in his hands when he tries to hold things or grab things. It does not occur at rest and is not associated with gait abnormalities. He had optic neuritis in the right eye one year ago. His vision has improved and the eye pain is gone. His MRI scan showed a lesion in his right optic nerve that was acute, with stable slightly progressive white matter disease typical of his MS. He had no other new neurological symptoms. He does have erectile dysfunction, and had been given shot therapy by a urologist. He is on Aubagio. He denies any side effect from the injections, other than a rare flulike episode. This is his first exacerbation in many years. He has had no other new focal weakness, visual problems, sensory loss, cognitive issues, gait problems, fevers or trauma or other new neurological symptoms. A complete review of systems and symptoms he's had no other new medical problems, complications or illnesses except for the erectile dysfunction and MS and tremor. Past Medical History:   Diagnosis Date    Multiple sclerosis ([address]       No past surgical history on file. Family History   Problem Relation Age of Onset    Hypertension Mother     Heart Disease Mother     High Cholesterol Mother    [facility] Arthritis-osteo Father     Hypertension Brother     Cancer Brother         lung    Hypertension Brother     Cancer Brother         prostate    Hypertension Sister     Other Sister         cerebral aneurysm      Social History     Tobacco Use    Smoking status: Never Smoker    Smokeless tobacco: Never Used   Substance Use Topics    Alcohol use: No       Current Outpatient Medications   Medication Sig Dispense Refill    teriflunomide (AUBAGIO) 14 mg tab Take 14 mg by mouth daily (with dinner). 30 Tab 11    tadalafil (CIALIS) 20 mg tablet Take 1 Tab by mouth daily as needed (BPH). 6 Tab 11    naproxen sodium (ALEVE) 220 mg tablet Take 220 mg by mouth as directed. Take 30 minutes before Avonex injections.  multivitamins-minerals-lutein (CENTRUM SILVER) Tab Take  by mouth.  Cholecalciferol, Vitamin D3, (VITAMIN D3) 1,000 unit cap Take  by mouth. No Known Allergies     Review of Systems:  A comprehensive review of systems was negative except for: Eyes: positive for visual disturbance  Genitourinary: positive for erectile dysfunction  Musculoskeletal: positive for myalgias, arthralgias and stiff joints  Neurological: positive for tremor and visual loss   Vitals:    11/01/19 1502   BP: 142/90   Pulse: 72   SpO2: 97%   Weight: 180 lb (81.6 kg)   Height: 5' 4\" (1.626 m)     Objective:     I      NEUROLOGICAL EXAM:    Appearance: The patient is well developed, well nourished, provides a coherent history and is in no acute distress. Mental Status: Oriented to time, place and person, and the president. Mood and affect appropriate. Speech is fluent without aphasia or dysarthria   Cranial Nerves:   Intact visual fields. Fundi show bilateral optic pallor, right side greater than left , with mild optic atrophy seen bilaterally, but no vascular lesions of the retina seen. . Visual acuity is difficult because the patient has marked presbyopia. Trish Bulls TARAS, EOM's full, no nystagmus, no ptosis. Facial sensation is normal. Corneal reflexes are not tested. Facial movement is symmetric. Hearing is normal bilaterally. Palate is midline with normal sternocleidomastoid and trapezius muscles are normal. Tongue is midline. Neck is supple without meningismus or bruits. Temporal arteries are not tender or enlarged   Motor:  4/5 strength in upper and lower proximal and distal muscles, but the right lower extremity shows strength about 4/5, and he has decreased rapid alternating movement in his right leg, and a trace in his right hand with slight weakness in the right upper extremity. Normal bulk and increased tone. No fasciculations.   Rapid alternate movement is slightly slow bilaterally   Reflexes:   Deep tendon reflexes 2+/4 and symmetrical.  No babinski or clonus present  Straight leg raising test is negative in the sitting position bilaterally  He has some mild percussion tenderness over the lower lumbar spine   Sensory:   Normal to touch, pinprick and vibration and temperature . Gait:  Abnormal gait, the patient has slight stiffness in his legs and slightly clumsy in his walking, particular on the right side which has a spastic right hemiparesis, and he tends to stumble occasionally on the right leg. Tremor:   Mild bilateral intention tremor noted right greater than left. Cerebellar:   Mildly abnormal Romberg and tandem cerebellar signs present. Neurovascular:  Normal heart sounds and regular rhythm, peripheral pulses decreased, and no carotid bruits. Assessment:       ICD-10-CM ICD-9-CM    1. MS (multiple sclerosis) ([facility]) G35 340 MRI BRAIN W WO CONT      CBC WITH AUTOMATED DIFF      METABOLIC PANEL, COMPREHENSIVE   2. Lumbar back pain with radiculopathy affecting right lower extremity M54.16 724.4 MRI BRAIN W WO CONT      CBC WITH AUTOMATED DIFF      METABOLIC PANEL, COMPREHENSIVE   3. Spastic neurogenic bladders N31.9 596.54 MRI BRAIN W WO CONT      CBC WITH AUTOMATED DIFF      METABOLIC PANEL, COMPREHENSIVE   4.  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 He agrees to get MRI scan as above and get his metabolic parameters all checked again. .  We also did complete metabolic studies to rule out other causes of his symptoms, and they were okay  Patient sciatica is much better with exercise program, at this time no further treatment needed. Patient with refractory erectile dysfunction, will try to increase his sialoliths as tolerated. His neurogenic bladder was better on the Detrol, but he discontinued it because it did not seem to help and does not want any new medication and does not want to see a urologist  Patient encouraged to stay physically mentally active, do not smoke, take his vitamins and vitamin D every day, and call us if there is any problem. Patient has neurogenic bladder secondary to his MS, most likely a spastic bladder with urge incontinence  Routine metabolic parameters also checked to rule out side effects of his therapy on Avonex  Patient's essential tremor doesn't seem significant amount to treat yet, we will follow him he will call if any problems  We discussed other disease modifying drugs in view of his mild progression on his MRI scan, but the patient wants to continue his current therapy  He is encouraged to make sure that he take a multivitamin vitamin D on a regular basis, and remained medically and physically active. Followup in 6 months time or earlier if needed    Signed By: Tim Tucker MD     November 1, 2019       This note will not be viewable in [address]

## 2019-11-11 ENCOUNTER — TELEPHONE (OUTPATIENT)
Dept: NEUROLOGY | Age: 54
End: 2019-11-11

## 2019-11-11 NOTE — TELEPHONE ENCOUNTER
Jonas called from the patient care team to notify they were trying to schedule the MRI for the patient, but the open unit is at [place] and this is too far for the patient to go. He stated that her will need an MRI with anesthesia and will need a new order for this if warranted.     Please advise

## 2019-11-12 NOTE — TELEPHONE ENCOUNTER
I called the patient, told him general anesthesia is not recommended, just driving a little further to the open unit is much safer medically and he should do that because I do not want to put him in under general anesthesia unless he cannot do the open MRI  Patient understands and agrees.

## 2019-11-14 LAB
ALBUMIN SERPL-MCNC: 4.4 G/DL (ref 3.5–5.5)
ALBUMIN/GLOB SERPL: 2.2 {RATIO} (ref 1.2–2.2)
ALP SERPL-CCNC: 64 IU/L (ref 39–117)
ALT SERPL-CCNC: 16 IU/L (ref 0–44)
AST SERPL-CCNC: 24 IU/L (ref 0–40)
BASOPHILS # BLD AUTO: 0 X10E3/UL (ref 0–0.2)
BASOPHILS NFR BLD AUTO: 1 %
BILIRUB SERPL-MCNC: 0.7 MG/DL (ref 0–1.2)
BUN SERPL-MCNC: 16 MG/DL (ref 6–24)
BUN/CREAT SERPL: 18 (ref 9–20)
CALCIUM SERPL-MCNC: 9.6 MG/DL (ref 8.7–10.2)
CHLORIDE SERPL-SCNC: 106 MMOL/L (ref 96–106)
CO2 SERPL-SCNC: 24 MMOL/L (ref 20–29)
CREAT SERPL-MCNC: 0.88 MG/DL (ref 0.76–1.27)
EOSINOPHIL # BLD AUTO: 0 X10E3/UL (ref 0–0.4)
EOSINOPHIL NFR BLD AUTO: 0 %
ERYTHROCYTE [DISTWIDTH] IN BLOOD BY AUTOMATED COUNT: 12 % (ref 12.3–15.4)
GLOBULIN SER CALC-MCNC: 2 G/DL (ref 1.5–4.5)
GLUCOSE SERPL-MCNC: 94 MG/DL (ref 65–99)
HCT VFR BLD AUTO: 44.6 % (ref 37.5–51)
HGB BLD-MCNC: 14.3 G/DL (ref 13–17.7)
IMM GRANULOCYTES # BLD AUTO: 0 X10E3/UL (ref 0–0.1)
IMM GRANULOCYTES NFR BLD AUTO: 0 %
LYMPHOCYTES # BLD AUTO: 1.8 X10E3/UL (ref 0.7–3.1)
LYMPHOCYTES NFR BLD AUTO: 22 %
MCH RBC QN AUTO: 29 PG (ref 26.6–33)
MCHC RBC AUTO-ENTMCNC: 32.1 G/DL (ref 31.5–35.7)
MCV RBC AUTO: 91 FL (ref 79–97)
MONOCYTES # BLD AUTO: 0.9 X10E3/UL (ref 0.1–0.9)
MONOCYTES NFR BLD AUTO: 12 %
NEUTROPHILS # BLD AUTO: 5 X10E3/UL (ref 1.4–7)
NEUTROPHILS NFR BLD AUTO: 65 %
PLATELET # BLD AUTO: 284 X10E3/UL (ref 150–450)
POTASSIUM SERPL-SCNC: 3.8 MMOL/L (ref 3.5–5.2)
PROT SERPL-MCNC: 6.4 G/DL (ref 6–8.5)
RBC # BLD AUTO: 4.93 X10E6/UL (ref 4.14–5.8)
SODIUM SERPL-SCNC: 143 MMOL/L (ref 134–144)
WBC # BLD AUTO: 7.8 X10E3/UL (ref 3.4–10.8)

## 2019-11-21 ENCOUNTER — TELEPHONE (OUTPATIENT)
Dept: NEUROLOGY | Age: 54
End: 2019-11-21

## 2019-11-21 NOTE — TELEPHONE ENCOUNTER
Please call in regards to his MRI. He is having one at [facility] tomorrow for another doctor and wondering if he can have the one for Dr Mike Chaudhary tomorrow also.

## 2019-12-13 ENCOUNTER — TELEPHONE (OUTPATIENT)
Dept: NEUROLOGY | Age: 54
End: 2019-12-13

## 2019-12-13 NOTE — TELEPHONE ENCOUNTER
----- Message from Renee Lr sent at 12/13/2019  9:36 AM EST -----  Regarding: /Telephone  General Message/Vendor Calls    Caller's first and last name: Ganga Harper      Reason for call: location of testing       Callback required yes/no and why: yes      Best contact number(s): [phone]      Details to clarify the request:Pt called requesting a call back in regards to needing the address for testing pt testing is coming up soon . This is the second request  .      Renee Lr

## 2019-12-16 ENCOUNTER — TELEPHONE (OUTPATIENT)
Dept: NEUROLOGY | Age: 54
End: 2019-12-16

## 2019-12-16 NOTE — TELEPHONE ENCOUNTER
----- Message from Neo Begum sent at 12/16/2019 10:57 AM EST -----  Regarding: Dr. Yovany Cummings  Pt would like the address to the hospital he suppose go to get his Brain scan done.  Contact is [phone]

## 2020-01-10 ENCOUNTER — DOCUMENTATION ONLY (OUTPATIENT)
Dept: NEUROLOGY | Age: 55
End: 2020-01-10

## 2020-01-10 ENCOUNTER — HOSPITAL ENCOUNTER (OUTPATIENT)
Dept: MRI IMAGING | Age: 55
Discharge: HOME | End: 2020-01-10
Attending: PSYCHIATRY & NEUROLOGY
Payer: COMMERCIAL

## 2020-01-10 DIAGNOSIS — N31.8 SPASTIC NEUROGENIC BLADDERS: ICD-10-CM

## 2020-01-10 DIAGNOSIS — G35 MULTIPLE SCLEROSIS (HCC): ICD-10-CM

## 2020-01-10 DIAGNOSIS — M54.16 LUMBAR BACK PAIN WITH RADICULOPATHY AFFECTING RIGHT LOWER EXTREMITY: ICD-10-CM

## 2020-01-10 DIAGNOSIS — G35 MS (MULTIPLE SCLEROSIS) (HCC): ICD-10-CM

## 2020-01-10 PROCEDURE — A9575 INJ GADOTERATE MEGLUMI 0.1ML: HCPCS | Performed by: PSYCHIATRY & NEUROLOGY

## 2020-01-10 PROCEDURE — 70553 MRI BRAIN STEM W/O & W/DYE: CPT

## 2020-01-10 PROCEDURE — 74011250636 HC RX REV CODE- 250/636: Performed by: PSYCHIATRY & NEUROLOGY

## 2020-01-10 RX ORDER — GADOTERATE MEGLUMINE 376.9 MG/ML
16 INJECTION INTRAVENOUS
Status: COMPLETED | OUTPATIENT
Start: 2020-01-10 | End: 2020-01-10

## 2020-01-10 RX ADMIN — GADOTERATE MEGLUMINE 16 ML: 376.9 INJECTION INTRAVENOUS at 10:17

## 2020-01-11 NOTE — PROGRESS NOTES
I called the patient, left message because of no answer, his MRI scan showed no new enhancing lesions, so he will continue his Aubagio, repeat his scan again in 6 months time because he did have 3 small T2 new lesions since 2015 and since he started his Aubagio in 2018 is not clear whether that came before or after the Aubagio, but most likely before

## 2020-04-30 ENCOUNTER — OFFICE VISIT (OUTPATIENT)
Dept: NEUROLOGY | Age: 55
End: 2020-04-30

## 2020-04-30 ENCOUNTER — TELEPHONE (OUTPATIENT)
Dept: NEUROLOGY | Age: 55
End: 2020-04-30

## 2020-04-30 VITALS — BODY MASS INDEX: 30.56 KG/M2 | WEIGHT: 179 LBS | HEIGHT: 64 IN

## 2020-04-30 DIAGNOSIS — G35 MULTIPLE SCLEROSIS (HCC): ICD-10-CM

## 2020-04-30 DIAGNOSIS — K59.00 CONSTIPATION DUE TO NEUROGENIC BOWEL: ICD-10-CM

## 2020-04-30 DIAGNOSIS — N52.1 ERECTILE DYSFUNCTION DUE TO DISEASES CLASSIFIED ELSEWHERE: ICD-10-CM

## 2020-04-30 DIAGNOSIS — M54.16 LUMBAR BACK PAIN WITH RADICULOPATHY AFFECTING RIGHT LOWER EXTREMITY: ICD-10-CM

## 2020-04-30 DIAGNOSIS — G47.00 INSOMNIA, UNSPECIFIED TYPE: ICD-10-CM

## 2020-04-30 DIAGNOSIS — N31.8 SPASTIC NEUROGENIC BLADDERS: ICD-10-CM

## 2020-04-30 DIAGNOSIS — G35 MS (MULTIPLE SCLEROSIS) (HCC): Primary | ICD-10-CM

## 2020-04-30 DIAGNOSIS — K59.2 CONSTIPATION DUE TO NEUROGENIC BOWEL: ICD-10-CM

## 2020-04-30 DIAGNOSIS — G25.0 BENIGN ESSENTIAL TREMOR: ICD-10-CM

## 2020-04-30 RX ORDER — TRAZODONE HYDROCHLORIDE 50 MG/1
50-100 TABLET ORAL
Qty: 60 TAB | Refills: 11 | Status: SHIPPED | OUTPATIENT
Start: 2020-04-30 | End: 2020-06-02 | Stop reason: SDUPTHER

## 2020-04-30 NOTE — PROGRESS NOTES
Wilmar Mcgregor is a 60 y.o. male evaluated via telephone on 4/30/2020. Consent:  He and/or health care decision maker is aware that he may receive a bill for this telephone service, depending on his insurance coverage, and has provided verbal consent to proceed: Yes      Documentation:  I communicated with the patient and/or health care decision maker about his multiple sclerosis and disease treatment with Aubagio. Patient's lab work has remained stable on the medication with normal lymphocyte counts and normal liver functions. Patient had new problem of recent prostate surgery for prostate cancer, and did well with the surgery, and will get a follow-up lab test at the end of May. Patient has new problem of insomnia, cannot sleep, has tried melatonin and over-the-counter sleeping aids he cannot sleep, so we will try him on trazodone 50 to 100 mg at night as needed for sleep and see how he does with that. It is important to maintain his sleep cycle where he can function normally and not get too fatigued with his MS. Patient had MRI scan January 2020 that showed no enhancing lesions, but did show 3 new white matter lesions since his last scan 5 years ago, so we will repeat his scan again in another 6 months to make sure he does not have any disease activity. The patient however states he has no clinical disease that he knows of, feels great, has not had any new weakness, numbness or focal neurologic symptoms. He is taking his vitamins and his vitamin D and his Aubagio his medications were refilled for him today and we gave him the trazodone as above. He was advised of the side effect of the medication as far as drowsiness sedation and sleepiness. .   Details of this discussion including any medical advice provided: See note above, we spent 25 minutes with the patient today, going over his medications, going over his new problems of the prostate cancer prostate surgery and making sure that that did not aggravate his MS, going over his problem of insomnia and discussing the risk and benefits of various treatment options, and is tried all the over-the-counter medications including the melatonin, so we will try him on trazodone. We discussed his need for follow-up, need for repeat MRI scan and need for repeat blood testing done in 6 months time, he agrees and his follow-up as scheduled, will schedule his MRI then when he returns, and check his blood work back then again also. He will call if he needs any further problem, 6-month follow-up as arranged. Time of visit 25 minutes discussing all these options and therapies and treatment plans      I affirm this is a Patient Initiated Episode with an Established Patient who has not had a related appointment within my department in the past 7 days or scheduled within the next 24 hours.     Total Time: minutes: 21-30 minutes    Note: not billable if this call serves to triage the patient into an appointment for the relevant concern      Mackenzie Zelaya MD

## 2020-04-30 NOTE — TELEPHONE ENCOUNTER
----- Message from Leopoldo Mac sent at 4/30/2020 10:42 AM EDT -----  Regarding: Dr. Mauro Young  Pt returning a call from Bernadine Yang regarding his phone appt today.  Contact is [phone]

## 2020-04-30 NOTE — PATIENT INSTRUCTIONS
A Healthy Lifestyle: Care Instructions Your Care Instructions A healthy lifestyle can help you feel good, stay at a healthy weight, and have plenty of energy for both work and play. A healthy lifestyle is something you can share with your whole family. A healthy lifestyle also can lower your risk for serious health problems, such as high blood pressure, heart disease, and diabetes. You can follow a few steps listed below to improve your health and the health of your family. Follow-up care is a key part of your treatment and safety. Be sure to make and go to all appointments, and call your doctor if you are having problems. It's also a good idea to know your test results and keep a list of the medicines you take. How can you care for yourself at home? · Do not eat too much sugar, fat, or fast foods. You can still have dessert and treats now and then. The goal is moderation. · Start small to improve your eating habits. Pay attention to portion sizes, drink less juice and soda pop, and eat more fruits and vegetables. ? Eat a healthy amount of food. A 3-ounce serving of meat, for example, is about the size of a deck of cards. Fill the rest of your plate with vegetables and whole grains. ? Limit the amount of soda and sports drinks you have every day. Drink more water when you are thirsty. ? Eat at least 5 servings of fruits and vegetables every day. It may seem like a lot, but it is not hard to reach this goal. A serving or helping is 1 piece of fruit, 1 cup of vegetables, or 2 cups of leafy, raw vegetables. Have an apple or some carrot sticks as an afternoon snack instead of a candy bar. Try to have fruits and/or vegetables at every meal. 
· Make exercise part of your daily routine. You may want to start with simple activities, such as walking, bicycling, or slow swimming. Try to be active 30 to 60 minutes every day.  You do not need to do all 30 to 60 minutes all at once. For example, you can exercise 3 times a day for 10 or 20 minutes. Moderate exercise is safe for most people, but it is always a good idea to talk to your doctor before starting an exercise program. 
· Keep moving. [place] Spring[place] the lawn, work in the garden, or [place]. Take the stairs instead of the elevator at work. · If you smoke, quit. People who smoke have an increased risk for heart attack, stroke, cancer, and other lung illnesses. Quitting is hard, but there are ways to boost your chance of quitting tobacco for good. ? Use nicotine gum, patches, or lozenges. ? Ask your doctor about stop-smoking programs and medicines. ? Keep trying. In addition to reducing your risk of diseases in the future, you will notice some benefits soon after you stop using tobacco. If you have shortness of breath or asthma symptoms, they will likely get better within a few weeks after you quit. · Limit how much alcohol you drink. Moderate amounts of alcohol (up to 2 drinks a day for men, 1 drink a day for women) are okay. But drinking too much can lead to liver problems, high blood pressure, and other health problems. Family health If you have a family, there are many things you can do together to improve your health. · Eat meals together as a family as often as possible. · Eat healthy foods. This includes fruits, vegetables, lean meats and dairy, and whole grains. · Include your family in your fitness plan. Most people think of activities such as jogging or tennis as the way to fitness, but there are many ways you and your family can be more active. Anything that makes you breathe hard and gets your heart pumping is exercise. Here are some tips: 
? Walk to do errands or to take your child to school or the bus. 
? Go for a family bike ride after dinner instead of watching TV. Where can you learn more? Go to http://puneet-estephania.info/ Enter F618 in the search box to learn more about \"A Healthy Lifestyle: Care Instructions. \" Current as of: May 28, 2019Content Version: 12.4 © 4344-4465 [employer]wise, Incorporated. Care instructions adapted under license by "[employer] (which disclaims liability or warranty for this information). If you have questions about a medical condition or this instruction, always ask your healthcare professional. [place] [address] any warranty or liability for your use of this information. Office Policies 
 
o Phone calls/patient messages: Please allow up to 24 hours for someone in the office to contact you about your call or message. Be mindful your provider may be out of the office or your message may require further review. We encourage you to use [employer] for your messages as this is a faster, more efficient way to communicate with our office 
 
o Medication Refills: 
Prescription medications require up to 48 business hours to process. We encourage you to use [employer] for your refills. For controlled medications: Please allow up to 72 business hours to process. Certain medications may require you to  a written prescription at our office. NO narcotic/controlled medications will be prescribed after 4pm Monday through Friday or on weekends 
 
o Form/Paperwork Completion: We ask that you allow 7-14 business days. You may also download your forms to [employer] to have your doctor print off.

## 2020-06-02 DIAGNOSIS — K59.2 CONSTIPATION DUE TO NEUROGENIC BOWEL: ICD-10-CM

## 2020-06-02 DIAGNOSIS — N52.1 ERECTILE DYSFUNCTION DUE TO DISEASES CLASSIFIED ELSEWHERE: ICD-10-CM

## 2020-06-02 DIAGNOSIS — G35 MS (MULTIPLE SCLEROSIS) (HCC): ICD-10-CM

## 2020-06-02 DIAGNOSIS — M54.16 LUMBAR BACK PAIN WITH RADICULOPATHY AFFECTING RIGHT LOWER EXTREMITY: ICD-10-CM

## 2020-06-02 DIAGNOSIS — N31.8 SPASTIC NEUROGENIC BLADDERS: ICD-10-CM

## 2020-06-02 DIAGNOSIS — G25.0 BENIGN ESSENTIAL TREMOR: ICD-10-CM

## 2020-06-02 DIAGNOSIS — G47.00 INSOMNIA, UNSPECIFIED TYPE: ICD-10-CM

## 2020-06-02 DIAGNOSIS — K59.00 CONSTIPATION DUE TO NEUROGENIC BOWEL: ICD-10-CM

## 2020-06-02 DIAGNOSIS — G35 MULTIPLE SCLEROSIS (HCC): ICD-10-CM

## 2020-06-02 RX ORDER — TRAZODONE HYDROCHLORIDE 50 MG/1
50-100 TABLET ORAL
Qty: 180 TAB | Refills: 3 | Status: SHIPPED | OUTPATIENT
Start: 2020-06-02 | End: 2022-03-09 | Stop reason: ALTCHOICE

## 2020-06-02 NOTE — TELEPHONE ENCOUNTER
Future Appointments   Date Time Provider Jimmy Andria   10/23/2020 11:00 AM Kvng Chou  [facility]                         Last Appointment My Department:  4/30/2020    Please review and send in refill below if warranted. 90 day refills change    Requested Prescriptions     Pending Prescriptions Disp Refills    traZODone (DESYREL) 50 mg tablet 180 Tab 3     Sig: Take 1-2 Tabs by mouth nightly.

## 2020-07-16 ENCOUNTER — TELEPHONE (OUTPATIENT)
Dept: NEUROLOGY | Age: 55
End: 2020-07-16

## 2020-07-16 NOTE — TELEPHONE ENCOUNTER
----- Message from Ani Rodriguez sent at 2020 10:24 AM EDT -----  Regarding: CARMEN/TELEPHONE  Contact: [phone]  General Message/Vendor Call    Patient's first and last name: Cruz Germain  : 1965    ID numbers: #[id number]      Caller's first and last name:  Cruz Germain  Reason for call: Requesting a callback from Dr. Song Edu required yes/no and why: Yes  Best contact number(s): [phone]    Details to clarify the request: Patient requesting a callback from Dr. Kely Puga to discuss his MS.

## 2020-07-17 NOTE — TELEPHONE ENCOUNTER
Patient would need to have an appointment to discuss MS if this has to do with medications or treatment or changes in his condition. I left a message of this at his normal [phone] number since there wasn't an answer at the number below.     Can be with NP if the Dr is not available

## 2020-07-21 ENCOUNTER — TELEPHONE (OUTPATIENT)
Dept: NEUROLOGY | Age: 55
End: 2020-07-21

## 2020-07-21 NOTE — TELEPHONE ENCOUNTER
rec'd pa request from [facility] for Avonex. Faxed back to cancel the request as patient is not on Avonex. [employer] is active and approval is good to 12/6/20).

## 2020-07-23 ENCOUNTER — TELEPHONE (OUTPATIENT)
Dept: NEUROLOGY | Age: 55
End: 2020-07-23

## 2020-07-23 NOTE — TELEPHONE ENCOUNTER
----- Message from Rachel Gomez sent at 7/23/2020  2:30 PM EDT -----  Regarding: Dr. Soila Raines  General Message/Vendor Calls    Caller's first and last name:Holland Canas      Reason for call:disability paperwork      Callback required yes/no and why:yes      Best contact number(s):[phone]      Details to clarify the request:Pt requested a call from the doctor to discuss paperwork for disability.       Rachel Kidney

## 2020-07-27 ENCOUNTER — TELEPHONE (OUTPATIENT)
Dept: NEUROLOGY | Age: 55
End: 2020-07-27

## 2020-07-28 ENCOUNTER — TELEPHONE (OUTPATIENT)
Dept: NEUROLOGY | Age: 55
End: 2020-07-28

## 2020-07-28 NOTE — TELEPHONE ENCOUNTER
I called the patient and notified that Luisana Morales is working on the authorization for his [place].   I also notified him that he has to come in to discuss his MS and the possibility of disability    Made appointment for the patient

## 2020-07-28 NOTE — TELEPHONE ENCOUNTER
Luis BUSBY [facility] [id number])  Covered: Retail, Mail Order Unknown: Specialty, Long-Term Care               Member ID: [id number] 1965 - M     Group ID: [id number] MASSIMO BUTLER      Group Name: Helen Combs. COMMERCIAL/FDP [address] Cardenas As Primary Coverage    S/w [facility] [employer]     Set up PA for Lizabeth Case [id number]    PCN on CC shows [facility]  BIN   [id number]    Called pt, he states he is no longer working and has a new insurance plan - he gave ID Alana   [id number]. Says his wife is on the plan with him. ( I VERIFIED THROUGH [employer] - HE IS THE SPOUSE ON THE PLAN). Placed pt on hold and s/w [facility] PA Spec Dept - she could not give me his [facility] acct number - but was able to proceed with request verbally - she stated it met the criteria but will get final approval from pharmacist and letter to be faxed to me within 24 hours. They show another address on file and stated pt needs to call them to update his address and insurance. I called pt back, advised will f/u w/ him tomorrow once it approves. Asked that he give [facility] updated insurance and address verification to them. He understood and appreciated the information.

## 2020-07-28 NOTE — TELEPHONE ENCOUNTER
This is about his Aubagio. His pharmacy apparently keeps telling him it is not authorized.   Let us know as soon as she lets you know

## 2020-07-28 NOTE — TELEPHONE ENCOUNTER
The Aubagio P. A. 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 I have emailed Bogdan Romero at [employer] to verify and requesting last ship date. Barbra Carrel,     Can you please find out if patient is referring to something else besides the Aubagio?     I will update DISHA cornejo/ Faye's reply from [facility] [employer].

## 2020-07-30 ENCOUNTER — TELEPHONE (OUTPATIENT)
Dept: NEUROLOGY | Age: 55
End: 2020-07-30

## 2020-07-30 NOTE — TELEPHONE ENCOUNTER
Rec'd Aubagio approval for one year to 7/29/21 from [employer]. Faxed to [facility] and c: [employer] faxed.

## 2020-07-31 ENCOUNTER — TELEPHONE (OUTPATIENT)
Dept: NEUROLOGY | Age: 55
End: 2020-07-31

## 2020-07-31 PROBLEM — R26.9 GAIT ABNORMALITY: Status: ACTIVE | Noted: 2020-07-31

## 2020-07-31 PROBLEM — B96.20 E COLI BACTEREMIA: Status: RESOLVED | Noted: 2018-10-16 | Resolved: 2020-07-31

## 2020-07-31 PROBLEM — R25.1 TREMORS OF NERVOUS SYSTEM: Status: ACTIVE | Noted: 2020-07-31

## 2020-07-31 PROBLEM — Z86.69 HISTORY OF OPTIC NEURITIS: Status: ACTIVE | Noted: 2020-07-31

## 2020-07-31 PROBLEM — C61 CA PROSTATE, ADENOCA (HCC): Status: ACTIVE | Noted: 2018-04-12

## 2020-07-31 PROBLEM — B96.20 E COLI BACTEREMIA: Status: ACTIVE | Noted: 2018-10-16

## 2020-07-31 PROBLEM — R78.81 E COLI BACTEREMIA: Status: RESOLVED | Noted: 2018-10-16 | Resolved: 2020-07-31

## 2020-07-31 PROBLEM — R78.81 E COLI BACTEREMIA: Status: ACTIVE | Noted: 2018-10-16

## 2020-07-31 PROBLEM — G47.00 INSOMNIA: Status: ACTIVE | Noted: 2020-07-31

## 2020-07-31 PROBLEM — T81.44XA SEPSIS FOLLOWING PROCEDURE (HCC): Status: RESOLVED | Noted: 2018-10-12 | Resolved: 2020-07-31

## 2020-07-31 PROBLEM — R97.20 ELEVATED PSA: Status: ACTIVE | Noted: 2017-03-31

## 2020-07-31 PROBLEM — T81.44XA SEPSIS FOLLOWING PROCEDURE (HCC): Status: ACTIVE | Noted: 2018-10-12

## 2020-10-19 ENCOUNTER — OFFICE VISIT (OUTPATIENT)
Dept: NEUROLOGY | Age: 55
End: 2020-10-19
Payer: COMMERCIAL

## 2020-10-19 VITALS
OXYGEN SATURATION: 98 % | WEIGHT: 190 LBS | BODY MASS INDEX: 32.44 KG/M2 | RESPIRATION RATE: 12 BRPM | HEART RATE: 72 BPM | SYSTOLIC BLOOD PRESSURE: 130 MMHG | HEIGHT: 64 IN | TEMPERATURE: 97.3 F | DIASTOLIC BLOOD PRESSURE: 80 MMHG

## 2020-10-19 DIAGNOSIS — M54.16 LUMBAR BACK PAIN WITH RADICULOPATHY AFFECTING RIGHT LOWER EXTREMITY: ICD-10-CM

## 2020-10-19 DIAGNOSIS — G25.0 BENIGN ESSENTIAL TREMOR: ICD-10-CM

## 2020-10-19 DIAGNOSIS — G35 MULTIPLE SCLEROSIS (HCC): ICD-10-CM

## 2020-10-19 DIAGNOSIS — N31.8 SPASTIC NEUROGENIC BLADDERS: ICD-10-CM

## 2020-10-19 DIAGNOSIS — N52.1 ERECTILE DYSFUNCTION DUE TO DISEASES CLASSIFIED ELSEWHERE: ICD-10-CM

## 2020-10-19 DIAGNOSIS — G35 MS (MULTIPLE SCLEROSIS) (HCC): Primary | ICD-10-CM

## 2020-10-19 PROCEDURE — 99214 OFFICE O/P EST MOD 30 MIN: CPT | Performed by: PSYCHIATRY & NEUROLOGY

## 2020-10-19 NOTE — LETTER
10/19/2020 11:04 AM 
 
Patient:  Roxanna Burkitt YOB: 1965 Date of Visit: 10/19/2020 Dear No Recipients: Thank you for referring . Jay Chin to me for evaluation/treatment. Below are the relevant portions of my assessment and plan of care. Consult Subjective:  
 
Roxanna Burkitt is a 60 y.o. Right-handed Afro-American male seen for evaluation at the request of Dr. Umberto Michael of a new problem of fatigue and urinary incontinence, usually in the morning when he first wakes up and he cannot make it to the bathroom in time since his prostate surgery last March 2020 for prostate cancer, and they want to do seed implant but he refuses so far for that. He feels generally weak, fatigues quickly, has some cognitive impairment and mental slowing and with the incontinence and his MS wants to know whether he qualifies for disability because of his prostate cancer and MS. I told him that he could be a candidate for fatigue and mental slowing, and possibly the latter problem, we will try to help him, but I could not promise him again today. He apparently is going to start trying to work or go on disability in the near future. He has had no new symptoms, no neurologic increased weakness or sensory loss, continue to be tolerating his medication of Aubagio well without side effects. His last MRI scan done January 2020 did not show any new enhancing lesions, but did show 3 new lesions that is why we switched him. . Patient has been on Aubagio for the last 2 year and doing well, and not had any major recurrent neurological symptoms. 2 years ago patient had abnormal MRI scan of the brain and cervical spine showing 1 or 2 new lesions in the brain in 2 or 3 new lesions in the spine that were not enhancing, but change in the brain from 2014, and change in the spine from 2010.   He was on Avonex once a week, and switched to Aubagio 1 year ago, and has remained stable since. Patient was seen for increasing unsteadiness in walking, being more clumsy, no longer able to move fast, and feeling that his right side may be a little bit worse as far as his weakness and numbness for the last several weeks. His back pain is gotten much better with physical therapy and treatment with orthopedist that he no longer has a problem with that. He has problem with neurogenic bladder. He does not take Cialis for his erectile dysfunction anymore either. He has no difficulty with his bowel movements, but does have to get up to void 2 times a night. He has a slight tremor in his hands when he tries to hold things or grab things. It does not occur at rest and is not associated with gait abnormalities. He had optic neuritis in the right eye one year ago. His vision has improved and the eye pain is gone. His MRI scan showed a lesion in his right optic nerve that was acute, with stable slightly progressive white matter disease typical of his MS. He had no other new neurological symptoms. He does have erectile dysfunction, and had been given shot therapy by a urologist. He is on Aubagio. He denies any side effect from the injections, other than a rare flulike episode. This is his first exacerbation in many years. He has had no other new focal weakness, visual problems, sensory loss, cognitive issues, gait problems, fevers or trauma or other new neurological symptoms. A complete review of systems and symptoms he's had no other new medical problems, complications or illnesses except for the erectile dysfunction and MS and tremor. Past Medical History:  
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 Family History Problem Relation Age of Onset  Hypertension Mother  Heart Disease Mother  High Cholesterol Mother  Arthritis-osteo Father  Hypertension Brother  Cancer Brother   
     lung  Hypertension Brother  Cancer Brother   
     prostate  Hypertension Sister  Other Sister   
     cerebral aneurysm Social History Tobacco Use  Smoking status: Never Smoker  Smokeless tobacco: Never Used Substance Use Topics  Alcohol use: No  
   
Current Outpatient Medications Medication Sig Dispense Refill  azilsartan medoxomiL (EDARBI) 40 mg tablet Take  by mouth.  fluticasone propionate (FLONASE) 50 mcg/actuation nasal spray 1 [place] by Nasal route daily.  levocetirizine (XYZAL) 5 mg tablet Take 5 mg by mouth.  Linzess 145 mcg cap capsule TAKE 1 CAPSULE BY MOUTH EVERY DAY  traZODone (DESYREL) 50 mg tablet Take 1-2 Tabs by mouth nightly. 180 Tab 3  
 teriflunomide (Aubagio) 14 mg tablet Take 1 Tab by mouth daily (with dinner). 30 Tab 11  
 tadalafil (CIALIS) 20 mg tablet Take 1 Tab by mouth daily as needed (BPH). 6 Tab 11  
 naproxen sodium (ALEVE) 220 mg tablet Take 220 mg by mouth as directed. Take 30 minutes before Avonex injections.  multivitamins-minerals-lutein (CENTRUM SILVER) Tab Take  by mouth.  Cholecalciferol, Vitamin D3, (VITAMIN D3) 1,000 unit cap Take  by mouth. No Known Allergies Review of Systems: A comprehensive review of systems was negative except for: Eyes: positive for visual disturbance Genitourinary: positive for erectile dysfunction Musculoskeletal: positive for myalgias, arthralgias and stiff joints Neurological: positive for tremor and visual loss Vitals:  
 10/19/20 1001 BP: 130/80 Pulse: 72 Temp: 97.3 °F (36.3 °C) SpO2: 98% Weight: 190 lb (86.2 kg) Height: 5' 4\" (1.626 m) Objective: I 
 
 
NEUROLOGICAL EXAM: 
 
Appearance: The patient is well developed, well nourished, provides a coherent history and is in no acute distress. Mental Status: Oriented to time, place and person, and the president. Mood and affect appropriate. Speech is fluent without aphasia or dysarthria Cranial Nerves:   Intact visual fields. Fundi show bilateral optic pallor, right side greater than left , with mild optic atrophy seen bilaterally, but no vascular lesions of the retina seen. . Visual acuity is difficult because the patient has marked presbyopia. Ricka Distad TARAS, EOM's full, but patient has strabismus with his right eye deviating outward exophoria, no nystagmus, no ptosis. Facial sensation is normal. Corneal reflexes are not tested. Facial movement is symmetric. Hearing is normal bilaterally. Palate is midline with normal sternocleidomastoid and trapezius muscles are normal. Tongue is midline. Neck is supple without meningismus or bruits. Temporal arteries are not tender or enlarged Motor:  4/5 strength in upper and lower proximal and distal muscles, but the right lower extremity shows strength about 4/5, and he has decreased rapid alternating movement in his right leg, and a trace in his right hand with slight weakness in the right upper extremity. Normal bulk and increased tone. No fasciculations. Rapid alternate movement is slightly slow bilaterally Reflexes:   Deep tendon reflexes 2+/4 and symmetrical. 
No babinski or clonus present Straight leg raising test is negative in the sitting position bilaterally He has some mild percussion tenderness over the lower lumbar spine Sensory:   Normal to touch, pinprick and vibration and temperature . Gait:  Abnormal gait, the patient has slight stiffness in his legs and slightly clumsy in his walking, particular on the right side which has a spastic right hemiparesis, and he tends to stumble occasionally on the right leg. Tremor:   Mild bilateral intention tremor noted right greater than left. Cerebellar:   Mildly abnormal Romberg and tandem cerebellar signs present. Neurovascular:  Normal heart sounds and regular rhythm, peripheral pulses decreased, and no carotid bruits. Assessment:  
 
 
 
Plan:  
 
Patient with new problem of urinary incontinence combined with increasing fatigue and generalized weakness and some mild cognitive impairment want to know whether he qualifies for disability that told him I could not guarantee it but he certainly would be a candidate for it if he wanted to try, but Social Security makes her own mind up with their own examiners. He will need a repeat MRI scan early next year, but he did have one less than a year ago in January 2020, so we will see him back in 6 months time and then decide on getting one then. 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 He agrees to get his metabolic parameters all checked again. Marcie Cr We also did complete metabolic studies to rule out other causes of his symptoms, and they were okay Patient sciatica is much better with exercise program, at this time no further treatment needed. Patient with refractory erectile dysfunction, will try to increase his sialoliths as tolerated. Patient had prostatectomy done March 2020 for prostate cancer and still has incontinence. Patient encouraged to stay physically mentally active, do not smoke, take his vitamins and vitamin D every day, and call us if there is any problem. Patient has neurogenic bladder secondary to his MS, most likely a spastic bladder with urge incontinence Routine metabolic parameters also checked to rule out side effects of his therapy on Avonex Patient's essential tremor doesn't seem significant amount to treat yet, we will follow him he will call if any problems We discussed other disease modifying drugs in view of his mild progression on his MRI scan, but the patient wants to continue his current therapy He is encouraged to make sure that he take a multivitamin vitamin D on a regular basis, and remained medically and physically active. Followup in 6 months time or earlier if needed Signed By: Kim Powers MD   
 October 19, 2020 This note will not be viewable in [address] If you have questions, please do not hesitate to call me. I look forward to following Mr. Edwardo Goltz along with you. Sincerely, Kim Powers MD

## 2020-10-19 NOTE — PROGRESS NOTES
Consult    Subjective:     Rose Marie Osorio is a 60 y.o. Right-handed Afro-American male seen for evaluation at the request of Dr. Moshe Pennington of a new problem of fatigue and urinary incontinence, usually in the morning when he first wakes up and he cannot make it to the bathroom in time since his prostate surgery last March 2020 for prostate cancer, and they want to do seed implant but he refuses so far for that. He feels generally weak, fatigues quickly, has some cognitive impairment and mental slowing and with the incontinence and his MS wants to know whether he qualifies for disability because of his prostate cancer and MS. I told him that he could be a candidate for fatigue and mental slowing, and possibly the latter problem, we will try to help him, but I could not promise him again today. He apparently is going to start trying to work or go on disability in the near future. He has had no new symptoms, no neurologic increased weakness or sensory loss, continue to be tolerating his medication of Aubagio well without side effects. His last MRI scan done January 2020 did not show any new enhancing lesions, but did show 3 new lesions that is why we switched him. . Patient has been on Aubagio for the last 2 year and doing well, and not had any major recurrent neurological symptoms. 2 years ago patient had abnormal MRI scan of the brain and cervical spine showing 1 or 2 new lesions in the brain in 2 or 3 new lesions in the spine that were not enhancing, but change in the brain from 2014, and change in the spine from 2010. He was on Avonex once a week, and switched to Aubagio 1 year ago, and has remained stable since. Patient was seen for increasing unsteadiness in walking, being more clumsy, no longer able to move fast, and feeling that his right side may be a little bit worse as far as his weakness and numbness for the last several weeks.    His back pain is gotten much better with physical therapy and treatment with orthopedist that he no longer has a problem with that. He has problem with neurogenic bladder. He does not take Cialis for his erectile dysfunction anymore either. He has no difficulty with his bowel movements, but does have to get up to void 2 times a night. He has a slight tremor in his hands when he tries to hold things or grab things. It does not occur at rest and is not associated with gait abnormalities. He had optic neuritis in the right eye one year ago. His vision has improved and the eye pain is gone. His MRI scan showed a lesion in his right optic nerve that was acute, with stable slightly progressive white matter disease typical of his MS. He had no other new neurological symptoms. He does have erectile dysfunction, and had been given shot therapy by a urologist. He is on Aubagio. He denies any side effect from the injections, other than a rare flulike episode. This is his first exacerbation in many years. He has had no other new focal weakness, visual problems, sensory loss, cognitive issues, gait problems, fevers or trauma or other new neurological symptoms. A complete review of systems and symptoms he's had no other new medical problems, complications or illnesses except for the erectile dysfunction and MS and tremor. 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   Family History   Problem Relation Age of Onset    Hypertension Mother     Heart Disease Mother    [facility] High Cholesterol Mother     Arthritis-osteo Father     Hypertension Brother     Cancer Brother         lung    Hypertension Brother     Cancer Brother         prostate    Hypertension Sister     Other Sister         cerebral aneurysm      Social History     Tobacco Use    Smoking status: Never Smoker    Smokeless tobacco: Never Used   Substance Use Topics    Alcohol use: No       Current Outpatient Medications   Medication Sig Dispense Refill    azilsartan medoxomiL (EDARBI) 40 mg tablet Take  by mouth.  fluticasone propionate (FLONASE) 50 mcg/actuation nasal spray 1 [place] by Nasal route daily.  levocetirizine (XYZAL) 5 mg tablet Take 5 mg by mouth.  Linzess 145 mcg cap capsule TAKE 1 CAPSULE BY MOUTH EVERY DAY      traZODone (DESYREL) 50 mg tablet Take 1-2 Tabs by mouth nightly. 180 Tab 3    teriflunomide (Aubagio) 14 mg tablet Take 1 Tab by mouth daily (with dinner). 30 Tab 11    tadalafil (CIALIS) 20 mg tablet Take 1 Tab by mouth daily as needed (BPH). 6 Tab 11    naproxen sodium (ALEVE) 220 mg tablet Take 220 mg by mouth as directed. Take 30 minutes before Avonex injections.  multivitamins-minerals-lutein (CENTRUM SILVER) Tab Take  by mouth.  Cholecalciferol, Vitamin D3, (VITAMIN D3) 1,000 unit cap Take  by mouth. No Known Allergies     Review of Systems:  A comprehensive review of systems was negative except for: Eyes: positive for visual disturbance  Genitourinary: positive for erectile dysfunction  Musculoskeletal: positive for myalgias, arthralgias and stiff joints  Neurological: positive for tremor and visual loss   Vitals:    10/19/20 1001   BP: 130/80   Pulse: 72   Temp: 97.3 °F (36.3 °C)   SpO2: 98%   Weight: 190 lb (86.2 kg)   Height: 5' 4\" (1.626 m)     Objective:     I      NEUROLOGICAL EXAM:    Appearance: The patient is well developed, well nourished, provides a coherent history and is in no acute distress.    Mental Status: Oriented to time, place and person, and the president. Mood and affect appropriate. Speech is fluent without aphasia or dysarthria   Cranial Nerves:   Intact visual fields. Fundi show bilateral optic pallor, right side greater than left , with mild optic atrophy seen bilaterally, but no vascular lesions of the retina seen. . Visual acuity is difficult because the patient has marked presbyopia. Yareli GRAJEDA, EOM's full, but patient has strabismus with his right eye deviating outward exophoria, no nystagmus, no ptosis. Facial sensation is normal. Corneal reflexes are not tested. Facial movement is symmetric. Hearing is normal bilaterally. Palate is midline with normal sternocleidomastoid and trapezius muscles are normal. Tongue is midline. Neck is supple without meningismus or bruits. Temporal arteries are not tender or enlarged   Motor:  4/5 strength in upper and lower proximal and distal muscles, but the right lower extremity shows strength about 4/5, and he has decreased rapid alternating movement in his right leg, and a trace in his right hand with slight weakness in the right upper extremity. Normal bulk and increased tone. No fasciculations. Rapid alternate movement is slightly slow bilaterally   Reflexes:   Deep tendon reflexes 2+/4 and symmetrical.  No babinski or clonus present  Straight leg raising test is negative in the sitting position bilaterally  He has some mild percussion tenderness over the lower lumbar spine   Sensory:   Normal to touch, pinprick and vibration and temperature . Gait:  Abnormal gait, the patient has slight stiffness in his legs and slightly clumsy in his walking, particular on the right side which has a spastic right hemiparesis, and he tends to stumble occasionally on the right leg. Tremor:   Mild bilateral intention tremor noted right greater than left. Cerebellar:   Mildly abnormal Romberg and tandem cerebellar signs present.    Neurovascular:  Normal heart sounds and regular rhythm, peripheral pulses decreased, and no carotid bruits. Assessment:         Plan:     Patient with new problem of urinary incontinence combined with increasing fatigue and generalized weakness and some mild cognitive impairment want to know whether he qualifies for disability that told him I could not guarantee it but he certainly would be a candidate for it if he wanted to try, but [employer] makes her own mind up with their own examiners. He will need a repeat MRI scan early next year, but he did have one less than a year ago in January 2020, so we will see him back in 6 months time and then decide on getting one then. 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 He agrees to get his metabolic parameters all checked again. .  We also did complete metabolic studies to rule out other causes of his symptoms, and they were okay  Patient sciatica is much better with exercise program, at this time no further treatment needed. Patient with refractory erectile dysfunction, will try to increase his sialoliths as tolerated. Patient had prostatectomy done March 2020 for prostate cancer and still has incontinence. Patient encouraged to stay physically mentally active, do not smoke, take his vitamins and vitamin D every day, and call us if there is any problem.   Patient has neurogenic bladder secondary to his MS, most likely a spastic bladder with urge incontinence  Routine metabolic parameters also checked to rule out side effects of his therapy on Avonex  Patient's essential tremor doesn't seem significant amount to treat yet, we will follow him he will call if any problems  We discussed other disease modifying drugs in view of his mild progression on his MRI scan, but the patient wants to continue his current therapy  He is encouraged to make sure that he take a multivitamin vitamin D on a regular basis, and remained medically and physically active. Followup in 6 months time or earlier if needed    Signed By: Natalie Rudd MD     October 19, 2020       This note will not be viewable in [address]

## 2020-10-19 NOTE — LETTER
10/19/20 Patient: Isabel Vela YOB: 1965 Date of Visit: 10/19/2020 Winter Cota MD 
[facility] [address] [place] [address] VIA Facsimile: [phone] Dear Winter Cota MD, Thank you for referring Mr. Hubert Young to [facility] for evaluation. My notes for this consultation are attached. Consult Subjective:  
 
Isabel Vela is a 60 y.o. Right-handed Afro-American male seen for evaluation at the request of Dr. Axel Brownlee of a new problem of fatigue and urinary incontinence, usually in the morning when he first wakes up and he cannot make it to the bathroom in time since his prostate surgery last March 2020 for prostate cancer, and they want to do seed implant but he refuses so far for that. He feels generally weak, fatigues quickly, has some cognitive impairment and mental slowing and with the incontinence and his MS wants to know whether he qualifies for disability because of his prostate cancer and MS. I told him that he could be a candidate for fatigue and mental slowing, and possibly the latter problem, we will try to help him, but I could not promise him again today. He apparently is going to start trying to work or go on disability in the near future. He has had no new symptoms, no neurologic increased weakness or sensory loss, continue to be tolerating his medication of Aubagio well without side effects. His last MRI scan done January 2020 did not show any new enhancing lesions, but did show 3 new lesions that is why we switched him. . Patient has been on Aubagio for the last 2 year and doing well, and not had any major recurrent neurological symptoms.   2 years ago patient had abnormal MRI scan of the brain and cervical spine showing 1 or 2 new lesions in the brain in 2 or 3 new lesions in the spine that were not enhancing, but change in the brain from 2014, and change in the spine from 2010.  He was on Avonex once a week, and switched to Aubagio 1 year ago, and has remained stable since. Patient was seen for increasing unsteadiness in walking, being more clumsy, no longer able to move fast, and feeling that his right side may be a little bit worse as far as his weakness and numbness for the last several weeks. His back pain is gotten much better with physical therapy and treatment with orthopedist that he no longer has a problem with that. He has problem with neurogenic bladder. He does not take Cialis for his erectile dysfunction anymore either. He has no difficulty with his bowel movements, but does have to get up to void 2 times a night. He has a slight tremor in his hands when he tries to hold things or grab things. It does not occur at rest and is not associated with gait abnormalities. He had optic neuritis in the right eye one year ago. His vision has improved and the eye pain is gone. His MRI scan showed a lesion in his right optic nerve that was acute, with stable slightly progressive white matter disease typical of his MS. He had no other new neurological symptoms. He does have erectile dysfunction, and had been given shot therapy by a urologist. He is on Aubagio. He denies any side effect from the injections, other than a rare flulike episode. This is his first exacerbation in many years. He has had no other new focal weakness, visual problems, sensory loss, cognitive issues, gait problems, fevers or trauma or other new neurological symptoms. A complete review of systems and symptoms he's had no other new medical problems, complications or illnesses except for the erectile dysfunction and MS and tremor. Past Medical History:  
Diagnosis Date  CA prostate, adenoca ([facility] [address] 4/12/2018 Last Assessment & Plan:  CA of prostate, low risk on surveillance Plan recheck PSA today  Elevated PSA 3/31/2017 Last Assessment & Plan:  Elevated PSA Plan TRUS/Bx  Erectile dysfunction 5/21/2013  Gait abnormality 7/31/2020  
 History of optic neuritis 7/31/2020 Right eye  Insomnia 7/31/2020  Multiple sclerosis ([facility] [address]  Spastic neurogenic bladders 4/7/2016  Tremors of nervous system 7/31/2020  Vitamin D deficiency 4/7/2016 No past surgical history on file. Family History Problem Relation Age of Onset  Hypertension Mother  Heart Disease Mother  High Cholesterol Mother  Arthritis-osteo Father  Hypertension Brother  Cancer Brother   
     lung  Hypertension Brother  Cancer Brother   
     prostate  Hypertension Sister  Other Sister   
     cerebral aneurysm Social History Tobacco Use  Smoking status: Never Smoker  Smokeless tobacco: Never Used Substance Use Topics  Alcohol use: No  
   
Current Outpatient Medications Medication Sig Dispense Refill  azilsartan medoxomiL (EDARBI) 40 mg tablet Take  by mouth.  fluticasone propionate (FLONASE) 50 mcg/actuation nasal spray 1 [place] by Nasal route daily.  levocetirizine (XYZAL) 5 mg tablet Take 5 mg by mouth.  Linzess 145 mcg cap capsule TAKE 1 CAPSULE BY MOUTH EVERY DAY  traZODone (DESYREL) 50 mg tablet Take 1-2 Tabs by mouth nightly. 180 Tab 3  
 teriflunomide (Aubagio) 14 mg tablet Take 1 Tab by mouth daily (with dinner). 30 Tab 11  
 tadalafil (CIALIS) 20 mg tablet Take 1 Tab by mouth daily as needed (BPH). 6 Tab 11  
 naproxen sodium (ALEVE) 220 mg tablet Take 220 mg by mouth as directed. Take 30 minutes before Avonex injections.  multivitamins-minerals-lutein (CENTRUM SILVER) Tab Take  by mouth.  Cholecalciferol, Vitamin D3, (VITAMIN D3) 1,000 unit cap Take  by mouth. No Known Allergies Review of Systems: A comprehensive review of systems was negative except for: Eyes: positive for visual disturbance Genitourinary: positive for erectile dysfunction Musculoskeletal: positive for myalgias, arthralgias and stiff joints Neurological: positive for tremor and visual loss Vitals:  
 10/19/20 1001 BP: 130/80 Pulse: 72 Temp: 97.3 °F (36.3 °C) SpO2: 98% Weight: 190 lb (86.2 kg) Height: 5' 4\" (1.626 m) Objective: I 
 
 
NEUROLOGICAL EXAM: 
 
Appearance: The patient is well developed, well nourished, provides a coherent history and is in no acute distress. Mental Status: Oriented to time, place and person, and the president. Mood and affect appropriate. Speech is fluent without aphasia or dysarthria Cranial Nerves:   Intact visual fields. Fundi show bilateral optic pallor, right side greater than left , with mild optic atrophy seen bilaterally, but no vascular lesions of the retina seen. . Visual acuity is difficult because the patient has marked presbyopia. Deboraha Buys TARAS, EOM's full, but patient has strabismus with his right eye deviating outward exophoria, no nystagmus, no ptosis. Facial sensation is normal. Corneal reflexes are not tested. Facial movement is symmetric. Hearing is normal bilaterally. Palate is midline with normal sternocleidomastoid and trapezius muscles are normal. Tongue is midline. Neck is supple without meningismus or bruits. Temporal arteries are not tender or enlarged Motor:  4/5 strength in upper and lower proximal and distal muscles, but the right lower extremity shows strength about 4/5, and he has decreased rapid alternating movement in his right leg, and a trace in his right hand with slight weakness in the right upper extremity. Normal bulk and increased tone. No fasciculations. Rapid alternate movement is slightly slow bilaterally Reflexes:   Deep tendon reflexes 2+/4 and symmetrical. 
No babinski or clonus present Straight leg raising test is negative in the sitting position bilaterally He has some mild percussion tenderness over the lower lumbar spine Sensory:   Normal to touch, pinprick and vibration and temperature . Gait:  Abnormal gait, the patient has slight stiffness in his legs and slightly clumsy in his walking, particular on the right side which has a spastic right hemiparesis, and he tends to stumble occasionally on the right leg. Tremor:   Mild bilateral intention tremor noted right greater than left. Cerebellar:   Mildly abnormal Romberg and tandem cerebellar signs present. Neurovascular:  Normal heart sounds and regular rhythm, peripheral pulses decreased, and no carotid bruits. Assessment:  
 
{No Diagnosis Found} Plan:  
 
Patient with new problem of urinary incontinence combined with increasing fatigue and generalized weakness and some mild cognitive impairment want to know whether he qualifies for disability that told him I could not guarantee it but he certainly would be a candidate for it if he wanted to try, but Social Security makes her own mind up with their own examiners. He will need a repeat MRI scan early next year, but he did have one less than a year ago in January 2020, so we will see him back in 6 months time and then decide on getting one then. 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 He agrees to get his metabolic parameters all checked again. Jony Gonzalez We also did complete metabolic studies to rule out other causes of his symptoms, and they were okay Patient sciatica is much better with exercise program, at this time no further treatment needed. Patient with refractory erectile dysfunction, will try to increase his sialoliths as tolerated. Patient had prostatectomy done March 2020 for prostate cancer and still has incontinence. Patient encouraged to stay physically mentally active, do not smoke, take his vitamins and vitamin D every day, and call us if there is any problem. Patient has neurogenic bladder secondary to his MS, most likely a spastic bladder with urge incontinence Routine metabolic parameters also checked to rule out side effects of his therapy on Avonex Patient's essential tremor doesn't seem significant amount to treat yet, we will follow him he will call if any problems We discussed other disease modifying drugs in view of his mild progression on his MRI scan, but the patient wants to continue his current therapy He is encouraged to make sure that he take a multivitamin vitamin D on a regular basis, and remained medically and physically active. Followup in 6 months time or earlier if needed Signed By: Sabrina Williamson MD   
 October 19, 2020 This note will not be viewable in [address] If you have questions, please do not hesitate to call me. I look forward to following your patient along with you. Sincerely, Sabrina Williamson MD

## 2021-01-29 DIAGNOSIS — G35 MS (MULTIPLE SCLEROSIS) (HCC): ICD-10-CM

## 2021-01-29 DIAGNOSIS — G35 MULTIPLE SCLEROSIS (HCC): ICD-10-CM

## 2021-01-29 DIAGNOSIS — M54.16 LUMBAR BACK PAIN WITH RADICULOPATHY AFFECTING RIGHT LOWER EXTREMITY: ICD-10-CM

## 2021-01-29 DIAGNOSIS — K59.2 CONSTIPATION DUE TO NEUROGENIC BOWEL: ICD-10-CM

## 2021-01-29 DIAGNOSIS — K59.00 CONSTIPATION DUE TO NEUROGENIC BOWEL: ICD-10-CM

## 2021-01-29 DIAGNOSIS — N52.1 ERECTILE DYSFUNCTION DUE TO DISEASES CLASSIFIED ELSEWHERE: ICD-10-CM

## 2021-01-29 DIAGNOSIS — G25.0 BENIGN ESSENTIAL TREMOR: ICD-10-CM

## 2021-01-29 DIAGNOSIS — N31.8 SPASTIC NEUROGENIC BLADDERS: ICD-10-CM

## 2021-01-29 NOTE — TELEPHONE ENCOUNTER
----- Message from Daija Diego sent at 1/28/2021  3:19 PM EST -----  Regarding: Dr. Dean Grant (if not patient): Pt      Relationship of caller (if not patient):Self      Best contact number(s):[phone]      Name of medication and dosage if known:teriflunomide (Aubagio) 14 mg tablet       Is patient out of this medication (yes/no): no, 5 pills left      Pharmacy name: [facility] (Mail order)    Pharmacy listed in chart? (yes/no): No  Pharmacy phone number: [phone]      Details to clarify the request: Pt is requesting a refill of his Aubagio 14mg tab be sent to the new pharmacy listed above.        Daija Diego

## 2021-02-03 ENCOUNTER — TRANSCRIBE ORDER (OUTPATIENT)
Dept: NEUROLOGY | Age: 56
End: 2021-02-03

## 2021-02-04 ENCOUNTER — TELEPHONE (OUTPATIENT)
Dept: NEUROLOGY | Age: 56
End: 2021-02-04

## 2021-02-04 NOTE — TELEPHONE ENCOUNTER
----- Message from [employer] sent at 2/3/2021  3:21 PM EST -----  Regarding: Dr Jesus Manuel Alas Message/Vendor Calls    Caller's first and last name:Nya from [facility]      Reason for call:regarding prior auth for pts Aubagio 14mg tabs, pt is out of the medication and they can accept a verbal for the prior auth can and request a rush on rx call [phone]  his [address] required yes/no and why:no, only if there are questions regarding the prior auth from [employer]      Best contact number(s):[phone]     only if you have questions,  requesting call ins com for PA at number above       Details to clarify the request:      [employer]

## 2021-02-06 NOTE — TELEPHONE ENCOUNTER
PA is good to July 2021. A new Rx was written 1/29 w/ 11 refills - so he should be okay now and not out of refills.

## 2021-02-09 ENCOUNTER — TELEPHONE (OUTPATIENT)
Dept: NEUROLOGY | Age: 56
End: 2021-02-09

## 2021-02-09 DIAGNOSIS — M54.16 LUMBAR BACK PAIN WITH RADICULOPATHY AFFECTING RIGHT LOWER EXTREMITY: ICD-10-CM

## 2021-02-09 DIAGNOSIS — G35 MS (MULTIPLE SCLEROSIS) (HCC): ICD-10-CM

## 2021-02-09 DIAGNOSIS — N52.1 ERECTILE DYSFUNCTION DUE TO DISEASES CLASSIFIED ELSEWHERE: ICD-10-CM

## 2021-02-09 DIAGNOSIS — G35 MULTIPLE SCLEROSIS (HCC): ICD-10-CM

## 2021-02-09 DIAGNOSIS — N31.8 SPASTIC NEUROGENIC BLADDERS: ICD-10-CM

## 2021-02-09 DIAGNOSIS — K59.00 CONSTIPATION DUE TO NEUROGENIC BOWEL: ICD-10-CM

## 2021-02-09 DIAGNOSIS — G25.0 BENIGN ESSENTIAL TREMOR: ICD-10-CM

## 2021-02-09 DIAGNOSIS — K59.2 CONSTIPATION DUE TO NEUROGENIC BOWEL: ICD-10-CM

## 2021-02-09 NOTE — TELEPHONE ENCOUNTER
Aubagio Previous auth valid from July 2020 to July 2021. Hector BUSBY BB CD[facility]-N [id number] [facility])  Covered: Retail, Mail Order Unknown: Specialty, Long-Term Care               Member ID: [id number] 1965 - M     Group ID: [id number]      Group Name: Kalani Bautista [employer] [address] DUKES resubmitted using same medical plan and this time the BIN/PCN went to [facility] via [facility] on [employer] form. Approved: Approvedtoday  PA Case: [id number], Status: Approved, Coverage Starts on: 2/9/2021 12:00:00 AM, Coverage Ends on: 2/9/2022 12:00:00 AM.  Faxed c: [employer] and to [employer]. It's already escribed to [facility] - but I\"ll fax a copy of the auth to them too.    Pharmacy Contact    Telephone Fax   [phone]   Pharmacy Address and Hours    Address Hours   [facility] [address] [place] [phone] None Available           I

## 2021-02-26 ENCOUNTER — OFFICE VISIT (OUTPATIENT)
Dept: NEUROLOGY | Age: 56
End: 2021-02-26
Payer: COMMERCIAL

## 2021-02-26 VITALS
HEIGHT: 64 IN | BODY MASS INDEX: 32.44 KG/M2 | WEIGHT: 190 LBS | SYSTOLIC BLOOD PRESSURE: 134 MMHG | OXYGEN SATURATION: 99 % | HEART RATE: 83 BPM | DIASTOLIC BLOOD PRESSURE: 82 MMHG | RESPIRATION RATE: 16 BRPM

## 2021-02-26 DIAGNOSIS — N31.9 NEUROGENIC BLADDER: ICD-10-CM

## 2021-02-26 DIAGNOSIS — F48.2 PSEUDOBULBAR AFFECT: ICD-10-CM

## 2021-02-26 DIAGNOSIS — H46.9 OPTIC NEURITIS, LEFT: ICD-10-CM

## 2021-02-26 DIAGNOSIS — R41.89 COGNITIVE IMPAIRMENT: ICD-10-CM

## 2021-02-26 DIAGNOSIS — R32 URINARY INCONTINENCE, UNSPECIFIED TYPE: ICD-10-CM

## 2021-02-26 DIAGNOSIS — H53.9 VISUAL DISTURBANCE: ICD-10-CM

## 2021-02-26 DIAGNOSIS — G35 MULTIPLE SCLEROSIS (HCC): Primary | ICD-10-CM

## 2021-02-26 PROCEDURE — 99215 OFFICE O/P EST HI 40 MIN: CPT | Performed by: PSYCHIATRY & NEUROLOGY

## 2021-02-26 RX ORDER — DEXTROMETHORPHAN HYDROBROMIDE AND QUINIDINE SULFATE 20; 10 MG/1; MG/1
1 CAPSULE, GELATIN COATED ORAL EVERY 12 HOURS
Qty: 60 CAP | Refills: 2 | Status: SHIPPED | OUTPATIENT
Start: 2021-02-26 | End: 2021-02-26 | Stop reason: CLARIF

## 2021-02-26 RX ORDER — DEXTROMETHORPHAN HYDROBROMIDE AND QUINIDINE SULFATE 20; 10 MG/1; MG/1
1 CAPSULE, GELATIN COATED ORAL EVERY 12 HOURS
Qty: 60 CAP | Refills: 2 | Status: SHIPPED | OUTPATIENT
Start: 2021-02-26 | End: 2022-03-09 | Stop reason: ALTCHOICE

## 2021-02-26 RX ORDER — METHYLPREDNISOLONE 4 MG/1
TABLET ORAL
Qty: 1 DOSE PACK | Refills: 0 | Status: SHIPPED | OUTPATIENT
Start: 2021-02-26 | End: 2021-03-04 | Stop reason: ALTCHOICE

## 2021-02-26 NOTE — PROGRESS NOTES
Identified pt with two pt identifiers(name and ). Reviewed record in preparation for visit and have obtained necessary documentation. Chief Complaint   Patient presents with    Follow-up    Vision Change     blurred vision and seeing lines x3 weeks happens when not wearing glasses more so than when wearing them      Vitals:    21 1432   BP: 134/82   Pulse: 83   Resp: 16   SpO2: 99%   Weight: 190 lb (86.2 kg)   Height: 5' 4\" (1.626 m)   PainSc:   5   PainLoc: Knee       Health Maintenance Review: Patient reminded of \"due or due soon\" health maintenance. I have asked the patient to contact his/her primary care provider (PCP) for follow-up on his/her health maintenance. Coordination of Care Questionnaire:  :   1) Have you been to an emergency room, urgent care, or hospitalized since your last visit? If yes, where when, and reason for visit? no       2. Have seen or consulted any other health care provider since your last visit? If yes, where when, and reason for visit? NO      Patient is accompanied by self and wife I have received verbal consent from Juan Antonio Badillo to discuss any/all medical information while they are present in the room.

## 2021-02-26 NOTE — PROGRESS NOTES
[facility]  In Office FOLLOW-UP VISIT         Lionel Vincent is a 60 y.o. male who presents today for the following:  Chief Complaint   Patient presents with    Follow-up    Vision Change     blurred vision and seeing lines x3 weeks happens when not wearing glasses more so than when wearing them    Multiple Sclerosis         ASSESSMENT AND PLAN    Multiple sclerosis  Currently on Aubagio  Patient with worsening MRI scan of his brain over time. Additionally he is having some visual disturbances I am not convinced it is necessarily MS related but the patient is therefore we will set him up with oral steroids at this time. He needs neuro-ophthalmology evaluation he will be referred to Dr. Petey Raines  Patient needs to be changed off Aubagio we talked about Ocrevus patient led me to believe that he was ready to start the paperwork process on the [employer] when the paperwork was presented to him he got very defensive and refused to fill the paperwork which is fine.   For now is to stay on the Aubagio    Neurogenic bladder  May partially be related to history of prostate cancer and resection and treatment for that  But seems to be progressing with urgency as well as incontinence  Suspect MS basis  Needs MRI of the cervical and thoracic spine which has not been completed in over 10 years  He is also good to be referred to urology for further evaluation and management of his neurogenic bladder    Cognitive impairment  Definitely needs to be seen by neuropsychiatry for evaluation of cognitive process    Mood disorder  Seemingly pseudobulbar affect of disorder  Will start on Nudexta      Patient is presently applying for disability and I do agree with disability I do not think he can safely and effectively participate in the work environment given his current situation as stated above    This is complex patient with multiple moving parts related to his functional ability  I do think he needs to change treatment options he is clinically progressing and getting worse  MRI scan also progressed over time that is spring only I will be curious to see what we see in the thoracic and cervical spine providing those scans are not cost prohibitive to the patient          ICD-10-CM ICD-9-CM    1. Multiple sclerosis ([address]  G35 340 REFERRAL TO UROLOGY      REFERRAL TO NEUROPSYCHOLOGY      REFERRAL TO NEURO-OPHTHALMOLOGY      MRI CERV SPINE W WO CONT      MRI [facility] SPINE W WO CONT      methylPREDNISolone (MEDROL DOSEPACK) 4 mg tablet      DISCONTINUED: dextromethorphan-quiNIDine (Nuedexta) 20-10 mg per capsule   2. Cognitive impairment  R41.89 294.9 REFERRAL TO NEUROPSYCHOLOGY   3. Neurogenic bladder  N31.9 596.54 REFERRAL TO UROLOGY   4. Urinary incontinence, unspecified type  R32 788.30 REFERRAL TO UROLOGY   5. Visual disturbance  H53.9 368.9 REFERRAL TO NEURO-OPHTHALMOLOGY   6. Optic neuritis, left  H46.9 377.30 REFERRAL TO NEURO-OPHTHALMOLOGY   7. Pseudobulbar affect  F48.2 310.81 dextromethorphan-quiNIDine (Nuedexta) 20-10 mg per capsule         I attest that 40 minutes was spent on today's visit reviewing medical records and diagnostic testing deemed pertinent to this patient's care, along with direct time spent at patient's visit including the history, physical assessment and plan, discussing diagnosis and management along with documentation. HPI  Historical Data  Patient is known to the practice and was previously seen by Dr. Paulette Herrera    Neurologic diagnosis  Multiple sclerosis  Essential tremor  Lumbar radiculopathy    Other significant comorbid conditions/concerns  History of prostate cancer status post surgical resection    Pertinent diagnostic data    Results from Hospital Encounter encounter on 01/10/20   MRI BRAIN W WO CONT    Impression IMPRESSION:  Mild to moderate white matter signal abnormality consistent with  the patient's diagnosis of multiple sclerosis.  Slight progression since 2015  with at least 3 new focal lesions. No enhancing lesions. Results from [facility] encounter on 08/29/15   MRI CERV SPINE W WO CONT   MRI BRAIN W WO CONT       February 23, 2010 MRI of the cervical spine  IMPRESSION:   1. Tiny focus of T2 hyperintensity in the posterior spinal cord at C1 does   not have associated enhancement and has not significantly changed. No other   cervical spinal cord abnormality demonstrated. 2. Mild diffuse degenerative disc findings with minimal stenosis.       February 8, 2010 MRI of the cervical spine  IMPRESSION:   1. Persistent cord signal abnormality posterior to C2. Differential   diagnosis includes infectious inflammatory or demyelinating etiologies. Tumor is possible but felt to be less likely   2. Question second cord signal abnormality posterior to C3-C4   3. Patient is claustrophobic and if further MR imaging is required for the   clinical diagnosis conscious sedation would be required    January 8, 2010 MRI of the cervical spine  IMPRESSION:   1. Subtle cord signal abnormality posterior to C2 for which complete   cervical spine MRI with and without contrast is recommended     January 11, 2009 MRI of the brain with and without contrast  IMPRESSION: Prominent bilateral periventricular white matter signal changes   with focus of enhancement adjacent to right atrium. Consider demyelinating   processes.      Spinal tap completed March 4, 2010 supportive of MS with 9 oligoclonal banding, elevated myelin basic protein, elevated IgG synthesis rate, elevated IgG and IgG index and elevated CSF IgG to albumin ratio    Interim Data:     Multiple sclerosis   Current DMT  Aubagio 14 mg daily    Past DMT  Avonex    Rt arm and leg pain: New difficulty with movement  Bowels: [place] surgery prostate in [place] and has trouble ever since  Bladder: frequent bladder, no seeing a urologist    Mood: depressed, anxious, +nightmares w active dreams, no halluciantions; + + + cries easily    Cognition: + memory and concentration, processing    Vision  Visual disturbances of wavy lines  Had his prescription changed and its better when he wears his glasses worse when he takes his glasses off    Patient presently is not working and is applying for disability he was involved with  as best I understand it         No Known Allergies    Current Outpatient Medications   Medication Sig    dextromethorphan-quiNIDine (Nuedexta) 20-10 mg per capsule Take 1 Cap by mouth every twelve (12) hours.  methylPREDNISolone (MEDROL DOSEPACK) 4 mg tablet According to package instructions  Indications: Chronic daily headache or migraine status    teriflunomide (Aubagio) 14 mg tablet Take 1 Tab by mouth daily (with dinner).  multivitamins-minerals-lutein (CENTRUM SILVER) Tab Take  by mouth.  Cholecalciferol, Vitamin D3, (VITAMIN D3) 1,000 unit cap Take  by mouth.  azilsartan medoxomiL (EDARBI) 40 mg tablet Take  by mouth.  fluticasone propionate (FLONASE) 50 mcg/actuation nasal spray 1 Christine by Nasal route daily.  levocetirizine (XYZAL) 5 mg tablet Take 5 mg by mouth.  Linzess 145 mcg cap capsule TAKE 1 CAPSULE BY MOUTH EVERY DAY    traZODone (DESYREL) 50 mg tablet Take 1-2 Tabs by mouth nightly.  tadalafil (CIALIS) 20 mg tablet Take 1 Tab by mouth daily as needed (BPH).  naproxen sodium (ALEVE) 220 mg tablet Take 220 mg by mouth as directed. Take 30 minutes before Avonex injections. No current facility-administered medications for this visit.         Past medical history/surgical history, family history, and social history have been reviewed for today's visit      ROS    A ten system review of constitutional, cardiovascular, respiratory, musculoskeletal, endocrine, skin, SHEENT, genitourinary, psychiatric and neurologic systems was obtained and is unremarkable except as mentioned under HPI          EXAMINATION:     Visit Vitals  /82 (BP 1 Location: Left arm, BP Patient Position: Sitting)   Pulse 83   Resp 16   Ht 5' 4\" (1.626 m)   Wt 190 lb (86.2 kg)   SpO2 99%   BMI 32.61 kg/m²         General appearance: Patient is well-developed and well-nourished in no apparent distress and well groomed. Psych/mental health:  Affect: Appropriate    PHQ  3 most recent PHQ Screens 2/26/2021   Little interest or pleasure in doing things Not at all   Feeling down, depressed, irritable, or hopeless Not at all   Total Score PHQ 2 0       HEENT:   Normocephalic  With evidence of trauma: No  Full range of motion head neck: Yes  Tenderness to palpation of the head neck region: N/A      Cardiovascular:     Extremities warm to touch: N/A  Extremity swelling: No  Discoloration: No  Evidence of PVD: No    Respiratory:   Dyspnea on exertion: No   Abnormal effort on casual observation: No   Use of portable oxygen: No   Evidence of cyanosis: No     Musculoskeletal:   Evidence of significant bone deformities: No   Spinal curvature: No     Integumentary:    Obvious bruising: No   Lacerations or discoloration on casual observation: No       Neurological Examination:   Mental Status:        MMSE  No flowsheet data found.      Formal testing was not completed       Alert oriented and appropriate to general conversation  Slowed processing on general observation; seems to have difficulty processing complex information  Followed conversation and responded seemingly appropriate throughout the office visit  No word finding difficulties noted on casual observation  Able to follow directions without difficulty     Cranial Nerves:    EOMs intact  Mildly + bilaterally on general observation  No nystagmus is appreciated  Facial motor intact bilaterally  Hearing intact to conversation  Voice with normal projection, no evidence of secretion pooling  Shoulder shrug intact bilaterally  No tongue deviation appreciated     Motor:   Normal bulk  No tremor appreciated on today's exam  No abnormal movements appreciated on today's exam  Moves extremities spontaneously and with purpose  5/5 x 4      Sensation: Intact to light touch    Coordination/Cerebellar:   Grossly intact    Gait: Ambulates independently    Reflexes: Symmetrical    Fall risk assessment  Fall Risk Assessment, last 12 mths 2/26/2021   Able to walk? Yes   Fall in past 12 months? 0   Do you feel unsteady? 0   Are you worried about falling 0           Follow-up and Dispositions    · Return in about 6 weeks (around 4/9/2021) for In office appointment w Dr Elise Velásquez previously scheduled appointment.            Doug Obrien, MS, ANP-BC, [facility]

## 2021-02-26 NOTE — PATIENT INSTRUCTIONS
As per our discussion We are going to make referral to Dr. Mike Proctor to further evaluate the cognitive concerns A referral be made to urology for the bladder incontinence Referral to neuro-ophthalmology regarding your visual disturbance there is nothing that I can actually do at this point related to that I am going to start a medication to help you with mood that has been sent to your local pharmacy I will add steroids sent to your local pharmacy take as directed to help with your vision We will have you follow-up with Dr. Jimi Velasquez go over the results of your MRI scans I do recommend changing medications we have discussed Ocrevus and you were rather interested in this you can think about it further and you Dr. Jimi Velasquez can discuss it when you see him in follow-up I strongly encourage you to use my chart if you need to contact us. Presently with a high utilization of telehealth it is very difficult to get through on her phone lines. If you have not already activated my chart or you forget your password their instructions in this packet to get my chart activated. Office Policies 
 
o Phone calls/patient messages: Please allow up to 24 hours for someone in the office to contact you about your call or message. Be mindful your provider may be out of the office or your message may require further review. We encourage you to use [employer] for your messages as this is a faster, more efficient way to communicate with our office 
 
o Medication Refills: 
Prescription medications require up to 48 business hours to process. We encourage you to use [employer] for your refills. For controlled medications: Please allow up to 72 business hours to process. Certain medications may require you to  a written prescription at our office. NO narcotic/controlled medications will be prescribed after 4pm Monday through Friday or on weekends 
 
o Form/Paperwork Completion: We ask that you allow 7-14 business days. You may also download your forms to [employer] to have your doctor print off.

## 2021-03-02 ENCOUNTER — TELEPHONE (OUTPATIENT)
Dept: NEUROLOGY | Age: 56
End: 2021-03-02

## 2021-03-02 DIAGNOSIS — G35 MULTIPLE SCLEROSIS (HCC): Primary | ICD-10-CM

## 2021-03-03 ENCOUNTER — TELEPHONE (OUTPATIENT)
Dept: NEUROLOGY | Age: 56
End: 2021-03-03

## 2021-03-04 ENCOUNTER — TELEPHONE (OUTPATIENT)
Dept: NEUROLOGY | Age: 56
End: 2021-03-04

## 2021-03-04 ENCOUNTER — TRANSCRIBE ORDER (OUTPATIENT)
Dept: INTERNAL MEDICINE CLINIC | Age: 56
End: 2021-03-04

## 2021-03-04 DIAGNOSIS — M54.16 LUMBAR BACK PAIN WITH RADICULOPATHY AFFECTING RIGHT LOWER EXTREMITY: Primary | ICD-10-CM

## 2021-03-04 RX ORDER — DICLOFENAC SODIUM 50 MG/1
50 TABLET, DELAYED RELEASE ORAL
Qty: 100 TAB | Refills: 5 | Status: SHIPPED | OUTPATIENT
Start: 2021-03-04 | End: 2022-03-09 | Stop reason: ALTCHOICE

## 2021-03-12 ENCOUNTER — HOSPITAL ENCOUNTER (OUTPATIENT)
Dept: MRI IMAGING | Age: 56
Discharge: HOME | End: 2021-03-12
Attending: PSYCHIATRY & NEUROLOGY

## 2021-03-12 ENCOUNTER — TELEPHONE (OUTPATIENT)
Dept: NEUROLOGY | Age: 56
End: 2021-03-12

## 2021-03-12 DIAGNOSIS — G35 MULTIPLE SCLEROSIS (HCC): ICD-10-CM

## 2021-03-15 ENCOUNTER — TELEPHONE (OUTPATIENT)
Dept: NEUROLOGY | Age: 56
End: 2021-03-15

## 2021-03-15 DIAGNOSIS — G35 MULTIPLE SCLEROSIS (HCC): Primary | ICD-10-CM

## 2021-03-16 ENCOUNTER — TELEPHONE (OUTPATIENT)
Dept: NEUROLOGY | Age: 56
End: 2021-03-16

## 2021-03-26 DIAGNOSIS — G35 MULTIPLE SCLEROSIS (HCC): Primary | ICD-10-CM

## 2021-03-30 ENCOUNTER — HOSPITAL ENCOUNTER (OUTPATIENT)
Dept: PREADMISSION TESTING | Age: 56
Discharge: HOME | End: 2021-03-30
Payer: COMMERCIAL

## 2021-03-30 ENCOUNTER — TRANSCRIBE ORDER (OUTPATIENT)
Dept: REGISTRATION | Age: 56
End: 2021-03-30

## 2021-03-30 DIAGNOSIS — Z01.812 PRE-PROCEDURE LAB EXAM: ICD-10-CM

## 2021-03-30 DIAGNOSIS — Z01.812 PRE-PROCEDURE LAB EXAM: Primary | ICD-10-CM

## 2021-03-30 PROCEDURE — U0003 INFECTIOUS AGENT DETECTION BY NUCLEIC ACID (DNA OR RNA); SEVERE ACUTE RESPIRATORY SYNDROME CORONAVIRUS 2 (SARS-COV-2) (CORONAVIRUS DISEASE [COVID-19]), AMPLIFIED PROBE TECHNIQUE, MAKING USE OF HIGH THROUGHPUT TECHNOLOGIES AS DESCRIBED BY CMS-2020-01-R: HCPCS

## 2021-03-31 LAB — SARS-COV-2, COV2NT: NOT DETECTED

## 2021-04-01 ENCOUNTER — TELEPHONE (OUTPATIENT)
Dept: NEUROLOGY | Age: 56
End: 2021-04-01

## 2021-04-01 NOTE — TELEPHONE ENCOUNTER
----- Message from Elinor Louie sent at 3/25/2021  9:24 AM EDT -----  Regarding: Dr. Herman Esparza first and last name: N/A   Reason for call: Medication   Best contact number(s): [phone]  Details to clarify the request: Pt stated that he has called several times about getting a medication for a MRI. Pt MRI procedure is 03/26/2021 at 2:30 pm and pt still hasn't gotten a call back yet. Pt stated that he would like a call back as soon as possible.

## 2021-04-02 ENCOUNTER — HOSPITAL ENCOUNTER (OUTPATIENT)
Dept: MRI IMAGING | Age: 56
Discharge: HOME | End: 2021-04-02
Attending: PSYCHIATRY & NEUROLOGY
Payer: COMMERCIAL

## 2021-04-02 ENCOUNTER — ANESTHESIA EVENT (OUTPATIENT)
Dept: MRI IMAGING | Age: 56
End: 2021-04-02
Payer: COMMERCIAL

## 2021-04-02 ENCOUNTER — ANESTHESIA (OUTPATIENT)
Dept: MRI IMAGING | Age: 56
End: 2021-04-02
Payer: COMMERCIAL

## 2021-04-02 ENCOUNTER — DOCUMENTATION ONLY (OUTPATIENT)
Dept: NEUROLOGY | Age: 56
End: 2021-04-02

## 2021-04-02 ENCOUNTER — TELEPHONE (OUTPATIENT)
Dept: NEUROLOGY | Age: 56
End: 2021-04-02

## 2021-04-02 VITALS
BODY MASS INDEX: 33.46 KG/M2 | TEMPERATURE: 98.2 F | SYSTOLIC BLOOD PRESSURE: 141 MMHG | RESPIRATION RATE: 18 BRPM | HEIGHT: 64 IN | DIASTOLIC BLOOD PRESSURE: 88 MMHG | HEART RATE: 92 BPM | WEIGHT: 196 LBS | OXYGEN SATURATION: 98 %

## 2021-04-02 PROCEDURE — A9575 INJ GADOTERATE MEGLUMI 0.1ML: HCPCS | Performed by: PSYCHIATRY & NEUROLOGY

## 2021-04-02 PROCEDURE — 74011250636 HC RX REV CODE- 250/636: Performed by: PSYCHIATRY & NEUROLOGY

## 2021-04-02 PROCEDURE — 76210000063 HC OR PH I REC FIRST 0.5 HR

## 2021-04-02 PROCEDURE — 77030008684 HC TU ET CUF COVD -B: Performed by: ANESTHESIOLOGY

## 2021-04-02 PROCEDURE — 76060000035 HC ANESTHESIA 2 TO 2.5 HR

## 2021-04-02 PROCEDURE — 72156 MRI NECK SPINE W/O & W/DYE: CPT

## 2021-04-02 PROCEDURE — 77030026438 HC STYL ET INTUB CARD -A: Performed by: ANESTHESIOLOGY

## 2021-04-02 PROCEDURE — 70553 MRI BRAIN STEM W/O & W/DYE: CPT

## 2021-04-02 PROCEDURE — 74011250636 HC RX REV CODE- 250/636: Performed by: NURSE ANESTHETIST, CERTIFIED REGISTERED

## 2021-04-02 PROCEDURE — 74011000250 HC RX REV CODE- 250: Performed by: NURSE ANESTHETIST, CERTIFIED REGISTERED

## 2021-04-02 PROCEDURE — 72157 MRI CHEST SPINE W/O & W/DYE: CPT

## 2021-04-02 RX ORDER — GADOTERATE MEGLUMINE 376.9 MG/ML
18 INJECTION INTRAVENOUS
Status: COMPLETED | OUTPATIENT
Start: 2021-04-02 | End: 2021-04-02

## 2021-04-02 RX ORDER — DEXAMETHASONE SODIUM PHOSPHATE 4 MG/ML
INJECTION, SOLUTION INTRA-ARTICULAR; INTRALESIONAL; INTRAMUSCULAR; INTRAVENOUS; SOFT TISSUE AS NEEDED
Status: DISCONTINUED | OUTPATIENT
Start: 2021-04-02 | End: 2021-04-02 | Stop reason: HOSPADM

## 2021-04-02 RX ORDER — PROPOFOL 10 MG/ML
INJECTION, EMULSION INTRAVENOUS AS NEEDED
Status: DISCONTINUED | OUTPATIENT
Start: 2021-04-02 | End: 2021-04-02 | Stop reason: HOSPADM

## 2021-04-02 RX ORDER — MIDAZOLAM HYDROCHLORIDE 1 MG/ML
INJECTION, SOLUTION INTRAMUSCULAR; INTRAVENOUS AS NEEDED
Status: DISCONTINUED | OUTPATIENT
Start: 2021-04-02 | End: 2021-04-02 | Stop reason: HOSPADM

## 2021-04-02 RX ORDER — FENTANYL CITRATE 50 UG/ML
INJECTION, SOLUTION INTRAMUSCULAR; INTRAVENOUS AS NEEDED
Status: DISCONTINUED | OUTPATIENT
Start: 2021-04-02 | End: 2021-04-02 | Stop reason: HOSPADM

## 2021-04-02 RX ORDER — SODIUM CHLORIDE 9 MG/ML
INJECTION, SOLUTION INTRAVENOUS
Status: DISCONTINUED | OUTPATIENT
Start: 2021-04-02 | End: 2021-04-02 | Stop reason: HOSPADM

## 2021-04-02 RX ORDER — LIDOCAINE HYDROCHLORIDE 20 MG/ML
INJECTION, SOLUTION EPIDURAL; INFILTRATION; INTRACAUDAL; PERINEURAL AS NEEDED
Status: DISCONTINUED | OUTPATIENT
Start: 2021-04-02 | End: 2021-04-02 | Stop reason: HOSPADM

## 2021-04-02 RX ADMIN — MIDAZOLAM 2 MG: 1 INJECTION INTRAMUSCULAR; INTRAVENOUS at 12:44

## 2021-04-02 RX ADMIN — PROPOFOL 200 MG: 10 INJECTION, EMULSION INTRAVENOUS at 12:44

## 2021-04-02 RX ADMIN — SODIUM CHLORIDE: 900 INJECTION, SOLUTION INTRAVENOUS at 12:40

## 2021-04-02 RX ADMIN — GADOTERATE MEGLUMINE 20 ML: 376.9 INJECTION INTRAVENOUS at 13:56

## 2021-04-02 RX ADMIN — LIDOCAINE HYDROCHLORIDE 100 MG: 20 INJECTION, SOLUTION INTRAVENOUS at 12:44

## 2021-04-02 RX ADMIN — FENTANYL CITRATE 100 MCG: 50 INJECTION, SOLUTION INTRAMUSCULAR; INTRAVENOUS at 12:44

## 2021-04-02 RX ADMIN — DEXAMETHASONE SODIUM PHOSPHATE 8 MG: 4 INJECTION, SOLUTION INTRAMUSCULAR; INTRAVENOUS at 12:48

## 2021-04-02 NOTE — ROUTINE PROCESS
Received care of pt from lobby to radiology recovery area for MRI with anesthesia. Pt alert with no complaints, Pt being prepped for procedure at this time.

## 2021-04-02 NOTE — ANESTHESIA PREPROCEDURE EVALUATION
Relevant Problems   PERSONAL HX & FAMILY HX OF CANCER   (+) CA prostate, adenoca (HCC)       Anesthetic History   No history of anesthetic complications            Review of Systems / Medical History  Patient summary reviewed, nursing notes reviewed and pertinent labs reviewed    Pulmonary  Within defined limits                 Neuro/Psych             Comments: Multiple Sclerosis    Benign essential tremor    Lumbar back pain with radiculopathy affecting right lower extremity    Neurogenic bladder    Gait abnormality Cardiovascular                  Exercise tolerance: >4 METS  Comments: ECG (11/9/18):   Sinus tachycardia   No previous ECGs available    GI/Hepatic/Renal               Comments: Constipation due to neurogenic bowel    Neurogenic bladder Endo/Other            Comments: H/O Prostate Cancer s/p Prostatectomy (4/12/18) Other Findings   Comments: Vitamin D deficiency    Erectile dysfunction         Physical Exam    Airway  Mallampati: I  TM Distance: > 6 cm  Neck ROM: normal range of motion   Mouth opening: Normal     Cardiovascular  Regular rate and rhythm,  S1 and S2 normal,  no murmur, click, rub, or gallop             Dental  No notable dental hx       Pulmonary  Breath sounds clear to auscultation               Abdominal  GI exam deferred       Other Findings            Anesthetic Plan    ASA: 3  Anesthesia type: general          Induction: Intravenous  Anesthetic plan and risks discussed with: Patient

## 2021-04-02 NOTE — ROUTINE PROCESS
Pt accepting cracker and ginger ale without difficulty, no complaints voiced. Up to RR with positive void. Discharge instructions given and reviewed with pt and pt voices complete understanding of all instructions given. Pt taken out via wheelchair and discharged to home with friend Bart Peck in car.

## 2021-04-02 NOTE — DISCHARGE INSTRUCTIONS
Anesthesia Discharge Instructions: You have been given medications during your procedure that may affect your memory and mental judgment for the next 24 hours. During this time frame,  please follow the instructions listed below:       1. Have a responsible adult to drive you home and be with you for at least 12 hours. 2.    Rest today and resume normal activities tomorrow. 3.    Start with a soft bland diet and advance as tolerated to your recommended diet. 4.    Do not drive any motor vehicle or operate dangerous equipment until [facility] 5.    Avoid making critical decisions or signing legal documents until 6am tomorrow. 6.    Do not drink alcohol prior to 6am tomorrow. 7.    If you have sleep apnea and you plan to go home and take a nap, please use          your CPAP machine not only at bedtime, but also while napping for 24 hours.

## 2021-04-02 NOTE — PERIOP NOTES
Dr. Swathi Saldaña reported patient could leave PACU and return to Xray Recovery,  Report called to [facility]

## 2021-04-02 NOTE — ANESTHESIA POSTPROCEDURE EVALUATION
* No procedures listed *.    general    Anesthesia Post Evaluation      Multimodal analgesia: multimodal analgesia used between 6 hours prior to anesthesia start to PACU discharge  Patient location during evaluation: bedside  Patient participation: complete - patient participated  Level of consciousness: awake  Pain management: adequate  Airway patency: patent  Anesthetic complications: no  Cardiovascular status: acceptable  Respiratory status: acceptable  Hydration status: acceptable  Post anesthesia nausea and vomiting:  controlled  Final Post Anesthesia Temperature Assessment:  Normothermia (36.0-37.5 degrees C)      INITIAL Post-op Vital signs:   Vitals Value Taken Time   /99 04/02/21 1520   Temp 36.8 °C (98.2 °F) 04/02/21 1520   Pulse 95 04/02/21 1520   Resp 20 04/02/21 1520   SpO2 99 % 04/02/21 1520

## 2021-04-02 NOTE — PERIOP NOTES
Handoff Report from Operating Room to PACU    Report received from Samia Viveros RN and JOSEF Carlos CRNA regarding Theodora Delcid. * No surgeons listed *  And * No procedures listed *  confirmed   with allergies discussed. Anesthesia type, drugs, patient history, complications, estimated blood loss, vital signs, intake and output, and last pain medication, lines and temperature were reviewed.

## 2021-04-02 NOTE — TELEPHONE ENCOUNTER
----- Message from Lea Diehl sent at 3/29/2021  4:30 PM EDT -----  Regarding: Dr. Samantha Patel Message/Vendor Calls    Caller's first and last name:      Reason for call: COVID Test order needed       Callback required yes/no and why: yes       Best contact number(s): [phone]       Details to clarify the request: The office of Dr. Jeyson Deutsch is needing an order for a COVID test for the patient. . Fax: [phone]       Lea Diehl

## 2021-04-23 ENCOUNTER — OFFICE VISIT (OUTPATIENT)
Dept: NEUROLOGY | Age: 56
End: 2021-04-23
Payer: COMMERCIAL

## 2021-04-23 VITALS
HEIGHT: 64 IN | TEMPERATURE: 97.6 F | SYSTOLIC BLOOD PRESSURE: 166 MMHG | DIASTOLIC BLOOD PRESSURE: 104 MMHG | BODY MASS INDEX: 32.95 KG/M2 | HEART RATE: 80 BPM | WEIGHT: 193 LBS

## 2021-04-23 DIAGNOSIS — G25.0 BENIGN ESSENTIAL TREMOR: Primary | ICD-10-CM

## 2021-04-23 DIAGNOSIS — G35 MULTIPLE SCLEROSIS (HCC): ICD-10-CM

## 2021-04-23 DIAGNOSIS — M54.16 LUMBAR BACK PAIN WITH RADICULOPATHY AFFECTING RIGHT LOWER EXTREMITY: ICD-10-CM

## 2021-04-23 DIAGNOSIS — R41.89 COGNITIVE IMPAIRMENT: ICD-10-CM

## 2021-04-23 DIAGNOSIS — N31.9 NEUROGENIC BLADDER: ICD-10-CM

## 2021-04-23 DIAGNOSIS — M25.561 ACUTE PAIN OF RIGHT KNEE: ICD-10-CM

## 2021-04-23 PROCEDURE — 99214 OFFICE O/P EST MOD 30 MIN: CPT | Performed by: PSYCHIATRY & NEUROLOGY

## 2021-04-23 NOTE — PATIENT INSTRUCTIONS
Office Policies 
 
o Phone calls/patient messages: Please allow up to 24 hours for someone in the office to contact you about your call or message. Be mindful your provider may be out of the office or your message may require further review. We encourage you to use [employer] for your messages as this is a faster, more efficient way to communicate with our office 
 
o Medication Refills: 
Prescription medications require up to 48 business hours to process. We encourage you to use [employer] for your refills. For controlled medications: Please allow up to 72 business hours to process. Certain medications may require you to  a written prescription at our office. NO narcotic/controlled medications will be prescribed after 4pm Monday through Friday or on weekends 
 
o Form/Paperwork Completion: We ask that you allow 7-14 business days. You may also download your forms to [employer] to have your doctor print off. Due to COVID-19, please note there may be a longer wait time than usual. Thank you

## 2021-04-23 NOTE — LETTER
4/23/2021 Patient: Tarun Camacho YOB: 1965 Date of Visit: 4/23/2021 Angela Rayo MD 
[facility] [address] [facility] [address] [address] Via Fax: [phone] Dear Angela Rayo MD, Thank you for referring Mr. Serg Connell to [facility] for evaluation. My notes for this consultation are attached. Consult Subjective:  
 
Tarun Camacho is a 60 y.o. Right-handed Afro-American male seen for evaluation at the request of Dr. Jose J Forrest of a new problem of severe pain in his right knee that he said began 2 years ago, and apparently he saw some physician for it but does not know who or when or where for sure, and said he had x-rays done but there are none in our system, and they told him at that time it was his MS even though it causes pain just to stand on it and to bend his knee. It appears to be primarily a joint problem to me possibly with some overlay. He said his mid back for a couple months, but my last note last fall had no mention of knee pain nor did he mention this in his February virtual visit with the nurse practitioner Elis Holcomb. She had ordered MRIs of the brain and cervical and thoracic spine and none of these showed any new or enhancing lesions, so his MS looks stable without active new lesions, and he is trying to get his Aubagio approved in addition. He is trying for disability, and with his MS he certainly might be a candidate for that, but it is hard to explain his knee pain, so I will do an x-ray of the knee check arthritis titers, and possibly refer him to a knee specialist if he is not seeing anybody in 2 years. He also complains of fatigue and urinary incontinence, usually in the morning when he first wakes up and he cannot make it to the bathroom in time since his prostate surgery last March 2020 for prostate cancer, and they want to do seed implant but he refuses so far for that.   He feels generally weak, fatigues quickly, has some cognitive impairment and mental slowing and with the incontinence and his MS wants to know whether he qualifies for disability because of his prostate cancer and MS. I told him that he could be a candidate for fatigue and mental slowing, and possibly the latter problem, we will try to help him, but I could not promise him again today. His last MRI scan done January 2020 did not show any new enhancing lesions, but did show 3 new lesions that is why we switched him to Aubagio. Fabio Donnelly Patient has been on Aubagio for the last year and doing well, and not had any major recurrent neurological symptoms. He was on Avonex once a week, and switched to Aubagio 1 year ago, and has remained stable since. Patient was seen for increasing unsteadiness in walking, being more clumsy, no longer able to move fast, and feeling that his right side may be a little bit worse as far as his weakness and numbness for the last several weeks. His back pain is gotten much better with physical therapy and treatment with orthopedist that he no longer has a problem with that. He has problem with neurogenic bladder. He does not take Cialis for his erectile dysfunction anymore either. He has no difficulty with his bowel movements, but does have to get up to void 2 times a night. He has a slight tremor in his hands when he tries to hold things or grab things. It does not occur at rest and is not associated with gait abnormalities. He had optic neuritis in the right eye one year ago. His vision has improved and the eye pain is gone. His MRI scan showed a lesion in his right optic nerve that was acute, with stable slightly progressive white matter disease typical of his MS. He had no other new neurological symptoms. He does have erectile dysfunction, and had been given shot therapy by a urologist. He is on Aubagio. He denies any side effect from the injections, other than a rare flulike episode.  This is his first exacerbation in many years. He has had no other new focal weakness, visual problems, sensory loss, cognitive issues, gait problems, fevers or trauma or other new neurological symptoms. A complete review of systems and symptoms he's had no other new medical problems, complications or illnesses except for the erectile dysfunction and MS and tremor. Past Medical History:  
Diagnosis Date  CA prostate, adenoca ([address] 4/12/2018 Last Assessment & Plan:  CA of prostate, low risk on surveillance Plan recheck PSA today  Elevated PSA 3/31/2017 Last Assessment & Plan:  Elevated PSA Plan TRUS/Bx  Erectile dysfunction 5/21/2013  Gait abnormality 7/31/2020  
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 No pertinent surgical history. Family History Problem Relation Age of Onset  Hypertension Mother  Heart Disease Mother  High Cholesterol Mother  Arthritis-osteo Father  Hypertension Brother  Cancer Brother   
     lung  Hypertension Brother  Cancer Brother   
     prostate  Hypertension Sister  Other Sister   
     cerebral aneurysm Social History Tobacco Use  Smoking status: Never Smoker  Smokeless tobacco: Never Used Substance Use Topics  Alcohol use: No  
   
Current Outpatient Medications Medication Sig Dispense Refill  diclofenac EC (VOLTAREN) 50 mg EC tablet Take 1 Tab by mouth three (3) times daily as needed for Pain. 100 Tab 5  
 dextromethorphan-quiNIDine (Nuedexta) 20-10 mg per capsule Take 1 Cap by mouth every twelve (12) hours. 60 Cap 2  teriflunomide (Aubagio) 14 mg tablet Take 1 Tab by mouth daily (with dinner). 90 Tab 4  
 azilsartan medoxomiL (EDARBI) 40 mg tablet Take  by mouth.     
 fluticasone propionate (FLONASE) 50 mcg/actuation nasal spray 1 [place] by Nasal route daily.  levocetirizine (XYZAL) 5 mg tablet Take 5 mg by mouth.  Linzess 145 mcg cap capsule TAKE 1 CAPSULE BY MOUTH EVERY DAY  traZODone (DESYREL) 50 mg tablet Take 1-2 Tabs by mouth nightly. 180 Tab 3  
 tadalafil (CIALIS) 20 mg tablet Take 1 Tab by mouth daily as needed (BPH). 6 Tab 11  
 naproxen sodium (ALEVE) 220 mg tablet Take 220 mg by mouth as directed. Take 30 minutes before Avonex injections.  multivitamins-minerals-lutein (CENTRUM SILVER) Tab Take  by mouth.  Cholecalciferol, Vitamin D3, (VITAMIN D3) 1,000 unit cap Take  by mouth. No Known Allergies Review of Systems: A comprehensive review of systems was negative except for: Eyes: positive for visual disturbance Genitourinary: positive for erectile dysfunction Musculoskeletal: positive for myalgias, arthralgias and stiff joints Neurological: positive for tremor and visual loss Vitals:  
 04/23/21 1036 BP: (!) 166/104 Pulse: 80 Temp: 97.6 °F (36.4 °C) Weight: 193 lb (87.5 kg) Height: 5' 4\" (1.626 m) Objective: I 
 
 
NEUROLOGICAL EXAM: 
 
Appearance: The patient is well developed, well nourished, provides a coherent history and is in no acute distress. Mental Status: Oriented to time, place and person, and the president. Mood and affect appropriate. Speech is fluent without aphasia or dysarthria Cranial Nerves:   Intact visual fields. Fundi show bilateral optic pallor, right side greater than left , with mild optic atrophy seen bilaterally, but no vascular lesions of the retina seen. . Visual acuity is difficult because the patient has marked presbyopia. Nishi Ruben GRAJEDA, EOM's full, but patient has strabismus with his right eye deviating outward exophoria, no nystagmus, no ptosis. Facial sensation is normal. Corneal reflexes are not tested. Facial movement is symmetric. Hearing is normal bilaterally.  Palate is midline with normal sternocleidomastoid and trapezius muscles are normal. Tongue is midline. Neck is supple without meningismus or bruits. Temporal arteries are not tender or enlarged Motor:  4/5 strength in upper and lower proximal and distal muscles, but the right lower extremity shows strength about 4/5, and he has decreased rapid alternating movement in his right leg, and a trace in his right hand with slight weakness in the right upper extremity. Normal bulk and increased tone. No fasciculations. Rapid alternate movement is slightly slow bilaterally Reflexes:   Deep tendon reflexes 2+/4 and symmetrical. 
No babinski or clonus present Straight leg raising test is negative in the sitting position bilaterally He has some mild percussion tenderness over the lower lumbar spine Sensory:   Normal to touch, pinprick and vibration and temperature . Gait:  Abnormal gait, the patient has slight stiffness in his legs and slightly clumsy in his walking, particular on the right side which has a spastic right hemiparesis, and he tends to stumble occasionally on the right leg. He also complains of pain when he bends the knee or tries to walk on the leg in addition Tremor:   Mild bilateral intention tremor noted right greater than left. Cerebellar:   Mildly abnormal Romberg and tandem cerebellar signs present. Neurovascular:  Normal heart sounds and regular rhythm, peripheral pulses decreased, and no carotid bruits. Assessment:  
 
 
 
Plan:  
 
Patient with new problem of increasing knee pain, that I told him most likely reflects degenerative arthritis, possibly due to the strain on his knee because of his MS and abnormal gait, but we need an x-ray to see if there is anything going on the knee and check his arthritis panel and he will probably need referral back to a knee surgeon Patient with new problem of urinary incontinence combined with increasing fatigue and generalized weakness and some mild cognitive impairment want to know whether he qualifies for disability that told him I could not guarantee it but he certainly would be a candidate for it if he wanted to try, but Social [employer] makes her own mind up with their own examiners. His repeat MRI scans of the brain and cervical and thoracic spine done April 2, 2021 all showed stable disease without new enhancing lesions but he still has a lot of MS lesions that are old. We also did complete metabolic studies to rule out other causes of his symptoms, and they were okay Patient sciatica is much better with exercise program, at this time no further treatment needed. Patient with refractory erectile dysfunction, will try to increase his cialis as tolerated. Patient had prostatectomy done March 2020 for prostate cancer and still has incontinence. Patient encouraged to stay physically mentally active, do not smoke, take his vitamins and vitamin D every day, and call us if there is any problem. Patient has neurogenic bladder secondary to his MS, most likely a spastic bladder with urge incontinence He is to get blood work today, making sure that there is no side effect from his Aubagio either as far as hematologic or liver function abnormalities Patient's essential tremor doesn't seem significant amount to treat yet, we will follow him he will call if any problems We discussed other disease modifying drugs in view of his mild progression on his MRI scan, but the patient wants to continue his current therapy He is encouraged to make sure that he take a multivitamin vitamin D on a regular basis, and remained medically and physically active. 39 minutes spent with the patient and his wife going over all of these problems in detail with them, discussing his diagnosis prognosis treatment options and testing needed, and reviewing all of his old notes looking for knee problems which the do not seem to see any notes justifying knee problems.  
Followup in 6 months time or earlier if needed Signed By: Lela Jones MD   
 April 23, 2021 This note will not be viewable in [address] If you have questions, please do not hesitate to call me. I look forward to following your patient along with you. Sincerely, Lela Jones MD

## 2021-04-24 NOTE — PROGRESS NOTES
Consult    Subjective:     Kamryn Quiroga is a 60 y.o. Right-handed Afro-American male seen for evaluation at the request of Dr. Ruba Bolivar of a new problem of severe pain in his right knee that he said began 2 years ago, and apparently he saw some physician for it but does not know who or when or where for sure, and said he had x-rays done but there are none in our system, and they told him at that time it was his MS even though it causes pain just to stand on it and to bend his knee. It appears to be primarily a joint problem to me possibly with some overlay. He said his mid back for a couple months, but my last note last fall had no mention of knee pain nor did he mention this in his February virtual visit with the nurse practitioner Allison Collins. She had ordered MRIs of the brain and cervical and thoracic spine and none of these showed any new or enhancing lesions, so his MS looks stable without active new lesions, and he is trying to get his Aubagio approved in addition. He is trying for disability, and with his MS he certainly might be a candidate for that, but it is hard to explain his knee pain, so I will do an x-ray of the knee check arthritis titers, and possibly refer him to a knee specialist if he is not seeing anybody in 2 years. He also complains of fatigue and urinary incontinence, usually in the morning when he first wakes up and he cannot make it to the bathroom in time since his prostate surgery last March 2020 for prostate cancer, and they want to do seed implant but he refuses so far for that. He feels generally weak, fatigues quickly, has some cognitive impairment and mental slowing and with the incontinence and his MS wants to know whether he qualifies for disability because of his prostate cancer and MS. I told him that he could be a candidate for fatigue and mental slowing, and possibly the latter problem, we will try to help him, but I could not promise him again today.    His last MRI scan done January 2020 did not show any new enhancing lesions, but did show 3 new lesions that is why we switched him to Aubagio. Trey Crews Patient has been on Aubagio for the last year and doing well, and not had any major recurrent neurological symptoms. He was on Avonex once a week, and switched to Aubagio 1 year ago, and has remained stable since. Patient was seen for increasing unsteadiness in walking, being more clumsy, no longer able to move fast, and feeling that his right side may be a little bit worse as far as his weakness and numbness for the last several weeks. His back pain is gotten much better with physical therapy and treatment with orthopedist that he no longer has a problem with that. He has problem with neurogenic bladder. He does not take Cialis for his erectile dysfunction anymore either. He has no difficulty with his bowel movements, but does have to get up to void 2 times a night. He has a slight tremor in his hands when he tries to hold things or grab things. It does not occur at rest and is not associated with gait abnormalities. He had optic neuritis in the right eye one year ago. His vision has improved and the eye pain is gone. His MRI scan showed a lesion in his right optic nerve that was acute, with stable slightly progressive white matter disease typical of his MS. He had no other new neurological symptoms. He does have erectile dysfunction, and had been given shot therapy by a urologist. He is on Aubagio. He denies any side effect from the injections, other than a rare flulike episode. This is his first exacerbation in many years. He has had no other new focal weakness, visual problems, sensory loss, cognitive issues, gait problems, fevers or trauma or other new neurological symptoms. A complete review of systems and symptoms he's had no other new medical problems, complications or illnesses except for the erectile dysfunction and MS and tremor.     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 No pertinent surgical history. Family History   Problem Relation Age of Onset    Hypertension Mother     Heart Disease Mother     High Cholesterol Mother    Kristie.Aid Arthritis-osteo Father     Hypertension Brother     Cancer Brother         lung    Hypertension Brother     Cancer Brother         prostate    Hypertension Sister     Other Sister         cerebral aneurysm      Social History     Tobacco Use    Smoking status: Never Smoker    Smokeless tobacco: Never Used   Substance Use Topics    Alcohol use: No       Current Outpatient Medications   Medication Sig Dispense Refill    diclofenac EC (VOLTAREN) 50 mg EC tablet Take 1 Tab by mouth three (3) times daily as needed for Pain. 100 Tab 5    dextromethorphan-quiNIDine (Nuedexta) 20-10 mg per capsule Take 1 Cap by mouth every twelve (12) hours. 60 Cap 2    teriflunomide (Aubagio) 14 mg tablet Take 1 Tab by mouth daily (with dinner). 90 Tab 4    azilsartan medoxomiL (EDARBI) 40 mg tablet Take  by mouth.  fluticasone propionate (FLONASE) 50 mcg/actuation nasal spray 1 [place] by Nasal route daily.  levocetirizine (XYZAL) 5 mg tablet Take 5 mg by mouth.  Linzess 145 mcg cap capsule TAKE 1 CAPSULE BY MOUTH EVERY DAY      traZODone (DESYREL) 50 mg tablet Take 1-2 Tabs by mouth nightly. 180 Tab 3    tadalafil (CIALIS) 20 mg tablet Take 1 Tab by mouth daily as needed (BPH).  6 Tab 11    naproxen sodium (ALEVE) 220 mg tablet Take 220 mg by mouth as directed. Take 30 minutes before Avonex injections.  multivitamins-minerals-lutein (CENTRUM SILVER) Tab Take  by mouth.  Cholecalciferol, Vitamin D3, (VITAMIN D3) 1,000 unit cap Take  by mouth. No Known Allergies     Review of Systems:  A comprehensive review of systems was negative except for: Eyes: positive for visual disturbance  Genitourinary: positive for erectile dysfunction  Musculoskeletal: positive for myalgias, arthralgias and stiff joints  Neurological: positive for tremor and visual loss   Vitals:    04/23/21 1036   BP: (!) 166/104   Pulse: 80   Temp: 97.6 °F (36.4 °C)   Weight: 193 lb (87.5 kg)   Height: 5' 4\" (1.626 m)     Objective:     I      NEUROLOGICAL EXAM:    Appearance: The patient is well developed, well nourished, provides a coherent history and is in no acute distress. Mental Status: Oriented to time, place and person, and the president. Mood and affect appropriate. Speech is fluent without aphasia or dysarthria   Cranial Nerves:   Intact visual fields. Fundi show bilateral optic pallor, right side greater than left , with mild optic atrophy seen bilaterally, but no vascular lesions of the retina seen. . Visual acuity is difficult because the patient has marked presbyopia. Nishi Ruben GRAJEDA, EOM's full, but patient has strabismus with his right eye deviating outward exophoria, no nystagmus, no ptosis. Facial sensation is normal. Corneal reflexes are not tested. Facial movement is symmetric. Hearing is normal bilaterally. Palate is midline with normal sternocleidomastoid and trapezius muscles are normal. Tongue is midline. Neck is supple without meningismus or bruits.     Temporal arteries are not tender or enlarged   Motor:  4/5 strength in upper and lower proximal and distal muscles, but the right lower extremity shows strength about 4/5, and he has decreased rapid alternating movement in his right leg, and a trace in his right hand with slight weakness in the right upper extremity. Normal bulk and increased tone. No fasciculations. Rapid alternate movement is slightly slow bilaterally   Reflexes:   Deep tendon reflexes 2+/4 and symmetrical.  No babinski or clonus present  Straight leg raising test is negative in the sitting position bilaterally  He has some mild percussion tenderness over the lower lumbar spine   Sensory:   Normal to touch, pinprick and vibration and temperature . Gait:  Abnormal gait, the patient has slight stiffness in his legs and slightly clumsy in his walking, particular on the right side which has a spastic right hemiparesis, and he tends to stumble occasionally on the right leg. He also complains of pain when he bends the knee or tries to walk on the leg in addition   Tremor:   Mild bilateral intention tremor noted right greater than left. Cerebellar:   Mildly abnormal Romberg and tandem cerebellar signs present. Neurovascular:  Normal heart sounds and regular rhythm, peripheral pulses decreased, and no carotid bruits. Assessment:         Plan:     Patient with new problem of increasing knee pain, that I told him most likely reflects degenerative arthritis, possibly due to the strain on his knee because of his MS and abnormal gait, but we need an x-ray to see if there is anything going on the knee and check his arthritis panel and he will probably need referral back to a knee surgeon  Patient with new problem of urinary incontinence combined with increasing fatigue and generalized weakness and some mild cognitive impairment want to know whether he qualifies for disability that told him I could not guarantee it but he certainly would be a candidate for it if he wanted to try, but [employer] Security makes her own mind up with their own examiners.   His repeat MRI scans of the brain and cervical and thoracic spine done April 2, 2021 all showed stable disease without new enhancing lesions but he still has a lot of MS lesions that are old. We also did complete metabolic studies to rule out other causes of his symptoms, and they were okay  Patient sciatica is much better with exercise program, at this time no further treatment needed. Patient with refractory erectile dysfunction, will try to increase his cialis as tolerated. Patient had prostatectomy done March 2020 for prostate cancer and still has incontinence. Patient encouraged to stay physically mentally active, do not smoke, take his vitamins and vitamin D every day, and call us if there is any problem. Patient has neurogenic bladder secondary to his MS, most likely a spastic bladder with urge incontinence  He is to get blood work today, making sure that there is no side effect from his Aubagio either as far as hematologic or liver function abnormalities  Patient's essential tremor doesn't seem significant amount to treat yet, we will follow him he will call if any problems  We discussed other disease modifying drugs in view of his mild progression on his MRI scan, but the patient wants to continue his current therapy  He is encouraged to make sure that he take a multivitamin vitamin D on a regular basis, and remained medically and physically active. 39 minutes spent with the patient and his wife going over all of these problems in detail with them, discussing his diagnosis prognosis treatment options and testing needed, and reviewing all of his old notes looking for knee problems which the do not seem to see any notes justifying knee problems. Followup in 6 months time or earlier if needed    Signed By: Shay Allen MD     April 23, 2021       This note will not be viewable in [address]

## 2021-04-27 LAB
ALBUMIN SERPL-MCNC: 4.4 G/DL (ref 3.8–4.9)
ALBUMIN/GLOB SERPL: 2 {RATIO} (ref 1.2–2.2)
ALP SERPL-CCNC: 80 IU/L (ref 39–117)
ALT SERPL-CCNC: 18 IU/L (ref 0–44)
AST SERPL-CCNC: 28 IU/L (ref 0–40)
BASOPHILS # BLD AUTO: 0.1 X10E3/UL (ref 0–0.2)
BASOPHILS NFR BLD AUTO: 1 %
BILIRUB SERPL-MCNC: 0.7 MG/DL (ref 0–1.2)
BUN SERPL-MCNC: 16 MG/DL (ref 6–24)
BUN/CREAT SERPL: 15 (ref 9–20)
CALCIUM SERPL-MCNC: 9.7 MG/DL (ref 8.7–10.2)
CCP IGA+IGG SERPL IA-ACNC: 2 UNITS (ref 0–19)
CHLORIDE SERPL-SCNC: 109 MMOL/L (ref 96–106)
CO2 SERPL-SCNC: 24 MMOL/L (ref 20–29)
CREAT SERPL-MCNC: 1.05 MG/DL (ref 0.76–1.27)
EOSINOPHIL # BLD AUTO: 0 X10E3/UL (ref 0–0.4)
EOSINOPHIL NFR BLD AUTO: 0 %
ERYTHROCYTE [DISTWIDTH] IN BLOOD BY AUTOMATED COUNT: 12 % (ref 11.6–15.4)
ERYTHROCYTE [SEDIMENTATION RATE] IN BLOOD BY WESTERGREN METHOD: 3 MM/HR (ref 0–30)
GLOBULIN SER CALC-MCNC: 2.2 G/DL (ref 1.5–4.5)
GLUCOSE SERPL-MCNC: 101 MG/DL (ref 65–99)
HCT VFR BLD AUTO: 47.6 % (ref 37.5–51)
HGB BLD-MCNC: 15.9 G/DL (ref 13–17.7)
IMM GRANULOCYTES # BLD AUTO: 0 X10E3/UL (ref 0–0.1)
IMM GRANULOCYTES NFR BLD AUTO: 0 %
LYMPHOCYTES # BLD AUTO: 1.5 X10E3/UL (ref 0.7–3.1)
LYMPHOCYTES NFR BLD AUTO: 17 %
MCH RBC QN AUTO: 30.8 PG (ref 26.6–33)
MCHC RBC AUTO-ENTMCNC: 33.4 G/DL (ref 31.5–35.7)
MCV RBC AUTO: 92 FL (ref 79–97)
MONOCYTES # BLD AUTO: 1 X10E3/UL (ref 0.1–0.9)
MONOCYTES NFR BLD AUTO: 11 %
NEUTROPHILS # BLD AUTO: 6.4 X10E3/UL (ref 1.4–7)
NEUTROPHILS NFR BLD AUTO: 71 %
PLATELET # BLD AUTO: 307 X10E3/UL (ref 150–450)
POTASSIUM SERPL-SCNC: 4.5 MMOL/L (ref 3.5–5.2)
PROT SERPL-MCNC: 6.6 G/DL (ref 6–8.5)
RBC # BLD AUTO: 5.17 X10E6/UL (ref 4.14–5.8)
RHEUMATOID FACT SERPL-ACNC: <10 IU/ML (ref 0–13.9)
SODIUM SERPL-SCNC: 146 MMOL/L (ref 134–144)
WBC # BLD AUTO: 9 X10E3/UL (ref 3.4–10.8)

## 2021-10-01 ENCOUNTER — TELEPHONE (OUTPATIENT)
Dept: NEUROLOGY | Age: 56
End: 2021-10-01

## 2021-10-01 NOTE — TELEPHONE ENCOUNTER
Patient canceled appointment for 10-5 next appointment scheduled for 3-9 patient wants to know if he will still get a lab order for blood work while waiting for the next appointment

## 2021-10-05 DIAGNOSIS — G35 MULTIPLE SCLEROSIS (HCC): Primary | ICD-10-CM

## 2021-10-05 NOTE — TELEPHONE ENCOUNTER
I called the patient and left a message that I can have this ordered and will mail out for him. Will you please order the patient's routine MS labs?

## 2021-10-14 LAB
ALBUMIN SERPL-MCNC: 4.2 G/DL (ref 3.8–4.9)
ALBUMIN/GLOB SERPL: 1.9 {RATIO} (ref 1.2–2.2)
ALP SERPL-CCNC: 64 IU/L (ref 44–121)
ALT SERPL-CCNC: 14 IU/L (ref 0–44)
AST SERPL-CCNC: 23 IU/L (ref 0–40)
BASOPHILS # BLD AUTO: 0.1 X10E3/UL (ref 0–0.2)
BASOPHILS NFR BLD AUTO: 1 %
BILIRUB SERPL-MCNC: 0.6 MG/DL (ref 0–1.2)
BUN SERPL-MCNC: 17 MG/DL (ref 6–24)
BUN/CREAT SERPL: 18 (ref 9–20)
CALCIUM SERPL-MCNC: 9.2 MG/DL (ref 8.7–10.2)
CHLORIDE SERPL-SCNC: 106 MMOL/L (ref 96–106)
CO2 SERPL-SCNC: 25 MMOL/L (ref 20–29)
CREAT SERPL-MCNC: 0.96 MG/DL (ref 0.76–1.27)
EOSINOPHIL # BLD AUTO: 0 X10E3/UL (ref 0–0.4)
EOSINOPHIL NFR BLD AUTO: 0 %
ERYTHROCYTE [DISTWIDTH] IN BLOOD BY AUTOMATED COUNT: 12.1 % (ref 11.6–15.4)
GLOBULIN SER CALC-MCNC: 2.2 G/DL (ref 1.5–4.5)
GLUCOSE SERPL-MCNC: 96 MG/DL (ref 65–99)
HCT VFR BLD AUTO: 45.6 % (ref 37.5–51)
HGB BLD-MCNC: 14.9 G/DL (ref 13–17.7)
IMM GRANULOCYTES # BLD AUTO: 0 X10E3/UL (ref 0–0.1)
IMM GRANULOCYTES NFR BLD AUTO: 0 %
LYMPHOCYTES # BLD AUTO: 1.6 X10E3/UL (ref 0.7–3.1)
LYMPHOCYTES NFR BLD AUTO: 22 %
MCH RBC QN AUTO: 29.9 PG (ref 26.6–33)
MCHC RBC AUTO-ENTMCNC: 32.7 G/DL (ref 31.5–35.7)
MCV RBC AUTO: 91 FL (ref 79–97)
MONOCYTES # BLD AUTO: 0.8 X10E3/UL (ref 0.1–0.9)
MONOCYTES NFR BLD AUTO: 11 %
NEUTROPHILS # BLD AUTO: 4.7 X10E3/UL (ref 1.4–7)
NEUTROPHILS NFR BLD AUTO: 66 %
PLATELET # BLD AUTO: 283 X10E3/UL (ref 150–450)
POTASSIUM SERPL-SCNC: 4.2 MMOL/L (ref 3.5–5.2)
PROT SERPL-MCNC: 6.4 G/DL (ref 6–8.5)
RBC # BLD AUTO: 4.99 X10E6/UL (ref 4.14–5.8)
SODIUM SERPL-SCNC: 144 MMOL/L (ref 134–144)
WBC # BLD AUTO: 7.1 X10E3/UL (ref 3.4–10.8)

## 2022-02-03 DIAGNOSIS — N52.1 ERECTILE DYSFUNCTION DUE TO DISEASES CLASSIFIED ELSEWHERE: ICD-10-CM

## 2022-02-03 DIAGNOSIS — N31.8 SPASTIC NEUROGENIC BLADDERS: ICD-10-CM

## 2022-02-03 DIAGNOSIS — G35 MULTIPLE SCLEROSIS (HCC): ICD-10-CM

## 2022-02-03 DIAGNOSIS — G35 MS (MULTIPLE SCLEROSIS) (HCC): ICD-10-CM

## 2022-02-03 DIAGNOSIS — K59.00 CONSTIPATION DUE TO NEUROGENIC BOWEL: ICD-10-CM

## 2022-02-03 DIAGNOSIS — K59.2 CONSTIPATION DUE TO NEUROGENIC BOWEL: ICD-10-CM

## 2022-02-03 DIAGNOSIS — G25.0 BENIGN ESSENTIAL TREMOR: ICD-10-CM

## 2022-02-03 DIAGNOSIS — M54.16 LUMBAR BACK PAIN WITH RADICULOPATHY AFFECTING RIGHT LOWER EXTREMITY: ICD-10-CM

## 2022-02-03 RX ORDER — TERIFLUNOMIDE 14 MG/1
TABLET, FILM COATED ORAL
Qty: 30 TABLET | Refills: 3 | Status: SHIPPED | OUTPATIENT
Start: 2022-02-03 | End: 2022-03-09 | Stop reason: SDUPTHER

## 2022-02-12 ENCOUNTER — TELEPHONE (OUTPATIENT)
Dept: NEUROLOGY | Age: 57
End: 2022-02-12

## 2022-02-12 NOTE — TELEPHONE ENCOUNTER
[facility]ub[facility]      Approved PA Case: [id number], Status: Approved, Coverage Starts on: 2/12/2022 12:00:00 AM, Coverage Ends on: 2/12/2023 12:00:00 AM.    Faxed to [facility] [employer].

## 2022-03-09 ENCOUNTER — OFFICE VISIT (OUTPATIENT)
Dept: NEUROLOGY | Age: 57
End: 2022-03-09
Payer: COMMERCIAL

## 2022-03-09 VITALS
HEIGHT: 64 IN | SYSTOLIC BLOOD PRESSURE: 132 MMHG | OXYGEN SATURATION: 96 % | RESPIRATION RATE: 16 BRPM | WEIGHT: 196 LBS | HEART RATE: 77 BPM | BODY MASS INDEX: 33.46 KG/M2 | TEMPERATURE: 97.9 F | DIASTOLIC BLOOD PRESSURE: 80 MMHG

## 2022-03-09 DIAGNOSIS — G35 MULTIPLE SCLEROSIS (HCC): ICD-10-CM

## 2022-03-09 DIAGNOSIS — G35 MS (MULTIPLE SCLEROSIS) (HCC): ICD-10-CM

## 2022-03-09 DIAGNOSIS — N31.8 SPASTIC NEUROGENIC BLADDERS: ICD-10-CM

## 2022-03-09 DIAGNOSIS — N52.1 ERECTILE DYSFUNCTION DUE TO DISEASES CLASSIFIED ELSEWHERE: ICD-10-CM

## 2022-03-09 DIAGNOSIS — K59.00 CONSTIPATION DUE TO NEUROGENIC BOWEL: ICD-10-CM

## 2022-03-09 DIAGNOSIS — M54.16 LUMBAR BACK PAIN WITH RADICULOPATHY AFFECTING RIGHT LOWER EXTREMITY: Primary | ICD-10-CM

## 2022-03-09 DIAGNOSIS — G25.0 BENIGN ESSENTIAL TREMOR: ICD-10-CM

## 2022-03-09 DIAGNOSIS — K59.2 CONSTIPATION DUE TO NEUROGENIC BOWEL: ICD-10-CM

## 2022-03-09 PROCEDURE — 99213 OFFICE O/P EST LOW 20 MIN: CPT | Performed by: PSYCHIATRY & NEUROLOGY

## 2022-03-09 NOTE — PROGRESS NOTES
Consult    Subjective:     Jason Pineda is a 60 y.o. Right-handed Afro-American male seen for evaluation at the request of Dr. Kalyani Collazo for evaluation of his multiple sclerosis, and she has not been seen in 1 years time, and he states that he has had not had any new focal neurologic symptoms, nor has he developed any signs of a relapse or exacerbation of his MS, and 1 year ago he had MRIs of the brain and cervical spine and thoracic spine that showed stable MS disease without any acute lesions, and he is on Aubagio and tolerating it well without any side effects so far, and is trying to exercise and stay active and healthy and take his vitamins and vitamin D on a regular basis. Because of this we will continue his current medications and refill sent in for the patient today. He just had blood work done that was normal of the CBC differential and CMP we did not do any more blood work today. His exam remained stable with a mild quadriplegia and limp on the right side. That may be because of his knee and he seen several orthopedist for it. He is trying for disability, and with his MS he certainly might be a candidate for that, but it is hard to explain his knee pain, so I will do an x-ray of the knee check arthritis titers, and possibly refer him to a knee specialist if he is not seeing anybody in 2 years. He also complains of fatigue and urinary incontinence, usually in the morning when he first wakes up and he cannot make it to the bathroom in time since his prostate surgery last March 2020 for prostate cancer, and they want to do seed implant but he refuses so far for that. He feels generally weak, fatigues quickly, has some cognitive impairment and mental slowing and with the incontinence and his MS wants to know whether he qualifies for disability because of his prostate cancer and MS.   I told him that he could be a candidate for fatigue and mental slowing, and possibly the latter problem, we will try to help him, but I could not promise him again today. Patient has been on Aubagio for the last 2 years and doing well, and not had any major recurrent neurological symptoms. He was on Avonex once a week, and switched to Aubagio 1 year ago, and has remained stable since. Patient was seen for increasing unsteadiness in walking, being more clumsy, no longer able to move fast, and feeling that his right side may be a little bit worse as far as his weakness and numbness for the last several weeks. His back pain is gotten much better with physical therapy and treatment with orthopedist that he no longer has a problem with that. He has problem with neurogenic bladder. He does not take Cialis for his erectile dysfunction anymore either. He has no difficulty with his bowel movements, but does have to get up to void 2 times a night. He has a slight tremor in his hands when he tries to hold things or grab things. It does not occur at rest and is not associated with gait abnormalities. He had optic neuritis in the right eye one year ago. His vision has improved and the eye pain is gone. His MRI scan showed a lesion in his right optic nerve that was acute, with stable slightly progressive white matter disease typical of his MS. He had no other new neurological symptoms. He does have erectile dysfunction, and had been given shot therapy by a urologist. He is on Aubagio. He denies any side effect from the injections, other than a rare flulike episode. This is his first exacerbation in many years. He has had no other new focal weakness, visual problems, sensory loss, cognitive issues, gait problems, fevers or trauma or other new neurological symptoms. A complete review of systems and symptoms he's had no other new medical problems, complications or illnesses except for the erectile dysfunction and MS and tremor.     Past Medical History:   Diagnosis Date    CA prostate, adenoca ([facility] [address] 4/12/2018    Last Assessment & Plan:  CA of prostate, low risk on surveillance Plan recheck PSA today    Elevated PSA 3/31/2017    Last Assessment & Plan:  Elevated PSA Plan TRUS/Bx    Erectile dysfunction 5/21/2013    Gait abnormality 7/31/2020    History of optic neuritis 7/31/2020    Right eye    Insomnia 7/31/2020    Multiple sclerosis ([facility] [address]     Spastic neurogenic bladders 4/7/2016    Tremors of nervous system 7/31/2020    Vitamin D deficiency 4/7/2016      History reviewed. No pertinent surgical history. Family History   Problem Relation Age of Onset    Hypertension Mother     Heart Disease Mother     High Cholesterol Mother     OSTEOARTHRITIS Father     Hypertension Brother     Cancer Brother         lung    Hypertension Brother     Cancer Brother         prostate    Hypertension Sister     Other Sister         cerebral aneurysm      Social History     Tobacco Use    Smoking status: Never Smoker    Smokeless tobacco: Never Used   Substance Use Topics    Alcohol use: No       Current Outpatient Medications   Medication Sig Dispense Refill    teriflunomide (Aubagio) 14 mg tablet TAKE 1 TABLET BY MOUTH 1 TIME A DAY WITH DINNER. 30 Tablet 11    Linzess 145 mcg cap capsule TAKE 1 CAPSULE BY MOUTH EVERY DAY      naproxen sodium (ALEVE) 220 mg tablet Take 220 mg by mouth as directed. Take 30 minutes before Avonex injections.  multivitamins-minerals-lutein (CENTRUM SILVER) Tab Take  by mouth.  Cholecalciferol, Vitamin D3, (VITAMIN D3) 1,000 unit cap Take  by mouth.           No Known Allergies     Review of Systems:  A comprehensive review of systems was negative except for: Eyes: positive for visual disturbance  Genitourinary: positive for erectile dysfunction  Musculoskeletal: positive for myalgias, arthralgias and stiff joints  Neurological: positive for tremor and visual loss   Vitals:    03/09/22 0758   BP: 132/80   Pulse: 77   Resp: 16   Temp: 97.9 °F (36.6 °C)   TempSrc: Temporal   SpO2: 96%   Weight: 196 lb (88.9 kg)   Height: 5' 4\" (1.626 m)     Objective:     I      NEUROLOGICAL EXAM:    Appearance: The patient is well developed, well nourished, provides a coherent history and is in no acute distress. Mental Status: Oriented to time, place and person, and the president. Mood and affect appropriate. Speech is fluent without aphasia or dysarthria   Cranial Nerves:   Intact visual fields. Fundi show bilateral optic pallor, right side greater than left , with mild optic atrophy seen bilaterally, but no vascular lesions of the retina seen. . Visual acuity is difficult because the patient has marked presbyopia. Bonne Major TARAS, EOM's full, but patient has strabismus with his right eye deviating outward exophoria, no nystagmus, no ptosis. Facial sensation is normal. Corneal reflexes are not tested. Facial movement is symmetric. Hearing is normal bilaterally. Palate is midline with normal sternocleidomastoid and trapezius muscles are normal. Tongue is midline. Neck is supple without meningismus or bruits. Temporal arteries are not tender or enlarged   Motor:  4/5 strength in upper and lower proximal and distal muscles, but the right lower extremity shows strength about 4/5, and he has decreased rapid alternating movement in his right leg, and a trace in his right hand with slight weakness in the right upper extremity. Normal bulk and increased tone. No fasciculations. Rapid alternate movement is slightly slow bilaterally   Reflexes:   Deep tendon reflexes 2+/4 and symmetrical.  No babinski or clonus present  Straight leg raising test is negative in the sitting position bilaterally  He has some mild percussion tenderness over the lower lumbar spine   Sensory:   Normal to touch, pinprick and vibration and temperature .    Gait:  Abnormal gait, the patient has slight stiffness in his legs and slightly clumsy in his walking, particular on the right side which has a spastic right hemiparesis, and he tends to stumble occasionally on the right leg. He also complains of pain when he bends the knee or tries to walk on the leg in addition   Tremor:   Mild bilateral intention tremor noted right greater than left. Cerebellar:   Mildly abnormal Romberg and tandem cerebellar signs present. Neurovascular:  Normal heart sounds and regular rhythm, peripheral pulses decreased, and no carotid bruits. Assessment:         Plan:     Patient with MS, and he is not had any clear exacerbating or relapsing symptoms over the last year, and stable on Aubagio, and his last MRI scans of the brain and cervical and thoracic spine were all stable 1 year ago, and we will not repeat them yet, but he will continue his medication and he just had recent blood levels done so no blood monitoring was needed. Patient with problem of increasing knee pain, that I told him most likely reflects degenerative arthritis, possibly due to the strain on his knee because of his MS and abnormal gait, but we need an x-ray to see if there is anything going on the knee and check his arthritis panel and he will probably need referral back to a knee surgeon  Patient with new problem of urinary incontinence combined with increasing fatigue and generalized weakness and some mild cognitive impairment want to know whether he qualifies for disability that told him I could not guarantee it but he certainly would be a candidate for it if he wanted to try, but [employer] makes her own mind up with their own examiners. His repeat MRI scans of the brain and cervical and thoracic spine done April 2, 2021 all showed stable disease without new enhancing lesions but he still has a lot of MS lesions that are old. We also did complete metabolic studies to rule out other causes of his symptoms, and they were okay  Patient sciatica is much better with exercise program, at this time no further treatment needed.   Patient with refractory erectile dysfunction, will try to increase his cialis as tolerated. Patient had prostatectomy done March 2020 for prostate cancer and still has incontinence. Patient encouraged to stay physically mentally active, do not smoke, take his vitamins and vitamin D every day, and call us if there is any problem. Patient has neurogenic bladder secondary to his MS, most likely a spastic bladder with urge incontinence  He is to get blood work today, making sure that there is no side effect from his Aubagio either as far as hematologic or liver function abnormalities  Patient's essential tremor doesn't seem significant amount to treat yet, we will follow him he will call if any problems  We discussed other disease modifying drugs in view of his mild progression on his MRI scan, but the patient wants to continue his current therapy  He is encouraged to make sure that he take a multivitamin vitamin D on a regular basis, and remained medically and physically active. 29 minutes spent with the patient and his wife going over all of these problems in detail with them, discussing his diagnosis prognosis treatment options and testing needed, and reviewing all of his old notes looking for knee problems which the do not seem to see any notes justifying knee problems.   Followup in 6 months time or earlier if needed    Signed By: Yumiko Schuler MD     March 9, 2022

## 2022-03-09 NOTE — LETTER
3/9/2022    Patient: Christi Welsh   YOB: 1965   Date of Visit: 3/9/2022     Obed Hayes MD  [facility]  [address]  [address] [facility] [address]  Via Fax: [phone]    Dear Obed Hayes MD,      Thank you for referring Mr. Anali Dexter to [facility] for evaluation. My notes for this consultation are attached. Chief Complaint   Patient presents with    Follow-up     tremors and memory issues         Consult    Subjective:     Christi Welsh is a 60 y.o. Right-handed Afro-American male seen for evaluation at the request of Dr. Aleksandr Bolton for evaluation of his multiple sclerosis, and she has not been seen in 1 years time, and he states that he has had not had any new focal neurologic symptoms, nor has he developed any signs of a relapse or exacerbation of his MS, and 1 year ago he had MRIs of the brain and cervical spine and thoracic spine that showed stable MS disease without any acute lesions, and he is on Aubagio and tolerating it well without any side effects so far, and is trying to exercise and stay active and healthy and take his vitamins and vitamin D on a regular basis. Because of this we will continue his current medications and refill sent in for the patient today. He just had blood work done that was normal of the CBC differential and CMP we did not do any more blood work today. His exam remained stable with a mild quadriplegia and limp on the right side. That may be because of his knee and he seen several orthopedist for it. He is trying for disability, and with his MS he certainly might be a candidate for that, but it is hard to explain his knee pain, so I will do an x-ray of the knee check arthritis titers, and possibly refer him to a knee specialist if he is not seeing anybody in 2 years.   He also complains of fatigue and urinary incontinence, usually in the morning when he first wakes up and he cannot make it to the bathroom in time since his prostate surgery last March 2020 for prostate cancer, and they want to do seed implant but he refuses so far for that. He feels generally weak, fatigues quickly, has some cognitive impairment and mental slowing and with the incontinence and his MS wants to know whether he qualifies for disability because of his prostate cancer and MS. I told him that he could be a candidate for fatigue and mental slowing, and possibly the latter problem, we will try to help him, but I could not promise him again today. Patient has been on Aubagio for the last 2 years and doing well, and not had any major recurrent neurological symptoms. He was on Avonex once a week, and switched to Aubagio 1 year ago, and has remained stable since. Patient was seen for increasing unsteadiness in walking, being more clumsy, no longer able to move fast, and feeling that his right side may be a little bit worse as far as his weakness and numbness for the last several weeks. His back pain is gotten much better with physical therapy and treatment with orthopedist that he no longer has a problem with that. He has problem with neurogenic bladder. He does not take Cialis for his erectile dysfunction anymore either. He has no difficulty with his bowel movements, but does have to get up to void 2 times a night. He has a slight tremor in his hands when he tries to hold things or grab things. It does not occur at rest and is not associated with gait abnormalities. He had optic neuritis in the right eye one year ago. His vision has improved and the eye pain is gone. His MRI scan showed a lesion in his right optic nerve that was acute, with stable slightly progressive white matter disease typical of his MS. He had no other new neurological symptoms. He does have erectile dysfunction, and had been given shot therapy by a urologist. He is on Aubagio. He denies any side effect from the injections, other than a rare flulike episode. This is his first exacerbation in many years. He has had no other new focal weakness, visual problems, sensory loss, cognitive issues, gait problems, fevers or trauma or other new neurological symptoms. A complete review of systems and symptoms he's had no other new medical problems, complications or illnesses except for the erectile dysfunction and MS and tremor. Past Medical History:   Diagnosis Date    CA prostate, adenoca ([address] 4/12/2018    Last Assessment & Plan:  CA of prostate, low risk on surveillance Plan recheck PSA today    Elevated PSA 3/31/2017    Last Assessment & Plan:  Elevated PSA Plan TRUS/Bx    Erectile dysfunction 5/21/2013    Gait abnormality 7/31/2020    History of optic neuritis 7/31/2020    Right eye    Insomnia 7/31/2020    Multiple sclerosis ([facility] [address]     Spastic neurogenic bladders 4/7/2016    Tremors of nervous system 7/31/2020    Vitamin D deficiency 4/7/2016      History reviewed. No pertinent surgical history. Family History   Problem Relation Age of Onset    Hypertension Mother     Heart Disease Mother     High Cholesterol Mother     OSTEOARTHRITIS Father     Hypertension Brother     Cancer Brother         lung    Hypertension Brother     Cancer Brother         prostate    Hypertension Sister     Other Sister         cerebral aneurysm      Social History     Tobacco Use    Smoking status: Never Smoker    Smokeless tobacco: Never Used   Substance Use Topics    Alcohol use: No       Current Outpatient Medications   Medication Sig Dispense Refill    teriflunomide (Aubagio) 14 mg tablet TAKE 1 TABLET BY MOUTH 1 TIME A DAY WITH DINNER. 30 Tablet 11    Linzess 145 mcg cap capsule TAKE 1 CAPSULE BY MOUTH EVERY DAY      naproxen sodium (ALEVE) 220 mg tablet Take 220 mg by mouth as directed. Take 30 minutes before Avonex injections.  multivitamins-minerals-lutein (CENTRUM SILVER) Tab Take  by mouth.       Cholecalciferol, Vitamin D3, (VITAMIN D3) 1,000 unit cap Take  by mouth. No Known Allergies     Review of Systems:  A comprehensive review of systems was negative except for: Eyes: positive for visual disturbance  Genitourinary: positive for erectile dysfunction  Musculoskeletal: positive for myalgias, arthralgias and stiff joints  Neurological: positive for tremor and visual loss   Vitals:    03/09/22 0758   BP: 132/80   Pulse: 77   Resp: 16   Temp: 97.9 °F (36.6 °C)   TempSrc: Temporal   SpO2: 96%   Weight: 196 lb (88.9 kg)   Height: 5' 4\" (1.626 m)     Objective:     I      NEUROLOGICAL EXAM:    Appearance: The patient is well developed, well nourished, provides a coherent history and is in no acute distress. Mental Status: Oriented to time, place and person, and the president. Mood and affect appropriate. Speech is fluent without aphasia or dysarthria   Cranial Nerves:   Intact visual fields. Fundi show bilateral optic pallor, right side greater than left , with mild optic atrophy seen bilaterally, but no vascular lesions of the retina seen. . Visual acuity is difficult because the patient has marked presbyopia. Jgarjun Gleason TARAS, EOM's full, but patient has strabismus with his right eye deviating outward exophoria, no nystagmus, no ptosis. Facial sensation is normal. Corneal reflexes are not tested. Facial movement is symmetric. Hearing is normal bilaterally. Palate is midline with normal sternocleidomastoid and trapezius muscles are normal. Tongue is midline. Neck is supple without meningismus or bruits. Temporal arteries are not tender or enlarged   Motor:  4/5 strength in upper and lower proximal and distal muscles, but the right lower extremity shows strength about 4/5, and he has decreased rapid alternating movement in his right leg, and a trace in his right hand with slight weakness in the right upper extremity. Normal bulk and increased tone. No fasciculations.   Rapid alternate movement is slightly slow bilaterally   Reflexes:   Deep tendon reflexes 2+/4 and symmetrical.  No babinski or clonus present  Straight leg raising test is negative in the sitting position bilaterally  He has some mild percussion tenderness over the lower lumbar spine   Sensory:   Normal to touch, pinprick and vibration and temperature . Gait:  Abnormal gait, the patient has slight stiffness in his legs and slightly clumsy in his walking, particular on the right side which has a spastic right hemiparesis, and he tends to stumble occasionally on the right leg. He also complains of pain when he bends the knee or tries to walk on the leg in addition   Tremor:   Mild bilateral intention tremor noted right greater than left. Cerebellar:   Mildly abnormal Romberg and tandem cerebellar signs present. Neurovascular:  Normal heart sounds and regular rhythm, peripheral pulses decreased, and no carotid bruits. Assessment:         Plan:     Patient with MS, and he is not had any clear exacerbating or relapsing symptoms over the last year, and stable on Aubagio, and his last MRI scans of the brain and cervical and thoracic spine were all stable 1 year ago, and we will not repeat them yet, but he will continue his medication and he just had recent blood levels done so no blood monitoring was needed.   Patient with problem of increasing knee pain, that I told him most likely reflects degenerative arthritis, possibly due to the strain on his knee because of his MS and abnormal gait, but we need an x-ray to see if there is anything going on the knee and check his arthritis panel and he will probably need referral back to a knee surgeon  Patient with new problem of urinary incontinence combined with increasing fatigue and generalized weakness and some mild cognitive impairment want to know whether he qualifies for disability that told him I could not guarantee it but he certainly would be a candidate for it if he wanted to try, but [employer] makes her own mind up with their own examiners. His repeat MRI scans of the brain and cervical and thoracic spine done April 2, 2021 all showed stable disease without new enhancing lesions but he still has a lot of MS lesions that are old. We also did complete metabolic studies to rule out other causes of his symptoms, and they were okay  Patient sciatica is much better with exercise program, at this time no further treatment needed. Patient with refractory erectile dysfunction, will try to increase his cialis as tolerated. Patient had prostatectomy done March 2020 for prostate cancer and still has incontinence. Patient encouraged to stay physically mentally active, do not smoke, take his vitamins and vitamin D every day, and call us if there is any problem. Patient has neurogenic bladder secondary to his MS, most likely a spastic bladder with urge incontinence  He is to get blood work today, making sure that there is no side effect from his Aubagio either as far as hematologic or liver function abnormalities  Patient's essential tremor doesn't seem significant amount to treat yet, we will follow him he will call if any problems  We discussed other disease modifying drugs in view of his mild progression on his MRI scan, but the patient wants to continue his current therapy  He is encouraged to make sure that he take a multivitamin vitamin D on a regular basis, and remained medically and physically active. 29 minutes spent with the patient and his wife going over all of these problems in detail with them, discussing his diagnosis prognosis treatment options and testing needed, and reviewing all of his old notes looking for knee problems which the do not seem to see any notes justifying knee problems. Followup in 6 months time or earlier if needed    Signed By: Ijeoma Rodas MD     March 9, 2022                 If you have questions, please do not hesitate to call me.  I look forward to following your patient along with you.      Sincerely,    Ericka Barreto MD

## 2022-03-19 PROBLEM — M25.561 ACUTE PAIN OF RIGHT KNEE: Status: ACTIVE | Noted: 2021-04-23

## 2022-03-19 PROBLEM — R97.20 ELEVATED PSA: Status: ACTIVE | Noted: 2017-03-31

## 2022-03-19 PROBLEM — R26.9 GAIT ABNORMALITY: Status: ACTIVE | Noted: 2020-07-31

## 2022-03-19 PROBLEM — R41.89 COGNITIVE IMPAIRMENT: Status: ACTIVE | Noted: 2021-04-23

## 2022-03-19 PROBLEM — R25.1 TREMORS OF NERVOUS SYSTEM: Status: ACTIVE | Noted: 2020-07-31

## 2022-03-19 PROBLEM — Z86.69 HISTORY OF OPTIC NEURITIS: Status: ACTIVE | Noted: 2020-07-31

## 2022-03-20 PROBLEM — N31.8 SPASTIC NEUROGENIC BLADDERS: Status: ACTIVE | Noted: 2020-10-19

## 2022-03-20 PROBLEM — G47.00 INSOMNIA: Status: ACTIVE | Noted: 2020-07-31

## 2022-03-20 PROBLEM — M54.16 LUMBAR BACK PAIN WITH RADICULOPATHY AFFECTING RIGHT LOWER EXTREMITY: Status: ACTIVE | Noted: 2017-07-28

## 2022-03-20 PROBLEM — C61 CA PROSTATE, ADENOCA (HCC): Status: ACTIVE | Noted: 2018-04-12

## 2022-07-18 ENCOUNTER — OFFICE VISIT (OUTPATIENT)
Dept: NEUROLOGY | Age: 57
End: 2022-07-18
Payer: MEDICARE

## 2022-07-18 VITALS
WEIGHT: 192.8 LBS | TEMPERATURE: 97.5 F | BODY MASS INDEX: 32.91 KG/M2 | SYSTOLIC BLOOD PRESSURE: 158 MMHG | HEART RATE: 78 BPM | HEIGHT: 64 IN | DIASTOLIC BLOOD PRESSURE: 82 MMHG | OXYGEN SATURATION: 98 % | RESPIRATION RATE: 16 BRPM

## 2022-07-18 DIAGNOSIS — G44.52 NEW PERSISTENT DAILY HEADACHE: ICD-10-CM

## 2022-07-18 DIAGNOSIS — G35 MULTIPLE SCLEROSIS (HCC): Primary | ICD-10-CM

## 2022-07-18 DIAGNOSIS — G43.019 INTRACTABLE MIGRAINE WITHOUT AURA AND WITHOUT STATUS MIGRAINOSUS: ICD-10-CM

## 2022-07-18 PROCEDURE — 3017F COLORECTAL CA SCREEN DOC REV: CPT | Performed by: PSYCHIATRY & NEUROLOGY

## 2022-07-18 PROCEDURE — G8427 DOCREV CUR MEDS BY ELIG CLIN: HCPCS | Performed by: PSYCHIATRY & NEUROLOGY

## 2022-07-18 PROCEDURE — G8417 CALC BMI ABV UP PARAM F/U: HCPCS | Performed by: PSYCHIATRY & NEUROLOGY

## 2022-07-18 PROCEDURE — 99215 OFFICE O/P EST HI 40 MIN: CPT | Performed by: PSYCHIATRY & NEUROLOGY

## 2022-07-18 PROCEDURE — G8432 DEP SCR NOT DOC, RNG: HCPCS | Performed by: PSYCHIATRY & NEUROLOGY

## 2022-07-18 PROCEDURE — 96372 THER/PROPH/DIAG INJ SC/IM: CPT | Performed by: PSYCHIATRY & NEUROLOGY

## 2022-07-18 RX ORDER — GABAPENTIN 100 MG/1
CAPSULE ORAL
Qty: 180 CAPSULE | Refills: 0 | Status: SHIPPED | OUTPATIENT
Start: 2022-07-18

## 2022-07-18 RX ORDER — KETOROLAC TROMETHAMINE 30 MG/ML
60 INJECTION, SOLUTION INTRAMUSCULAR; INTRAVENOUS ONCE
Qty: 2 ML | Refills: 0
Start: 2022-07-18 | End: 2022-07-18

## 2022-07-18 RX ORDER — ROSUVASTATIN CALCIUM 10 MG/1
10 TABLET, COATED ORAL DAILY
COMMUNITY
Start: 2022-03-28

## 2022-07-18 NOTE — ASSESSMENT & PLAN NOTE
Onset approximately July 11  Good results using Tylenol but now probably in rebound  No new neurologic symptoms other than the headache    He does have a history of headache and migraine this certainly may be migraine status which is now morphed into rebound    For now or deferring neuroimaging but if it does not improve in approximately a week I would recommend MRI scan of the brain    For now he was given a shot of Toradol 60 mg in the office  He is not to use any further over-the-counter analgesics  I have given him gabapentin 100 mg 1 to 2 tablets 3 times a day as needed which should help with the headaches and help to reduce analgesic rebound  He was given a 1 month supply no refills

## 2022-07-18 NOTE — PROGRESS NOTES
[address]  In Office FOLLOW-UP VISIT         Lyndsay Garcia is a 59 y.o. male who presents today for the following:  Chief Complaint   Patient presents with    Follow-up     pain in the back of the head to the forehead and started last monday and for the last couple of days it has gotten worse and wife states that his speech will be sluggish         ASSESSMENT AND PLAN    1. Multiple sclerosis ([address]  Assessment & Plan:  Seemingly stable continue on Aubagio 14 mg/day    Therapeutic blood work was ordered    When I saw him last back in 2021 there was some progression on MRI scan and we talked about changing DMT but for now he prefers to stay on the Aubagio  Orders:  -     [address] DIFF; Future  -     METABOLIC PANEL, COMPREHENSIVE; Future  2. Intractable migraine without aura and without status migrainosus  -     ketorolac tromethamine (TORADOL) 60 mg/2 mL soln; 2 mL by IntraMUSCular route once for 1 dose., No Print, Disp-2 mL, R-0  -     KETOROLAC TROMETHAMINE INJ  -     gabapentin (NEURONTIN) 100 mg capsule; 1 to 2 tablets 3 times daily as needed headache  Indications: neuropathic pain, Normal, Disp-180 Capsule, R-0  3. New persistent daily headache  Assessment & Plan:    Onset approximately July 11  Good results using Tylenol but now probably in rebound  No new neurologic symptoms other than the headache    He does have a history of headache and migraine this certainly may be migraine status which is now morphed into rebound    For now or deferring neuroimaging but if it does not improve in approximately a week I would recommend MRI scan of the brain    For now he was given a shot of Toradol 60 mg in the office  He is not to use any further over-the-counter analgesics  I have given him gabapentin 100 mg 1 to 2 tablets 3 times a day as needed which should help with the headaches and help to reduce analgesic rebound  He was given a 1 month supply no refills        This is complex patient with multiple moving parts related to his functional ability  I do think he needs to change treatment options he is clinically progressing and getting worse  MRI scan also progressed over time that is spring only I will be curious to see what we see in the thoracic and cervical spine providing those scans are not cost prohibitive to the patient          ICD-10-CM ICD-9-CM    1. Multiple sclerosis ([address]  G35 340 CBC WITH AUTOMATED DIFF      METABOLIC PANEL, COMPREHENSIVE   2. Intractable migraine without aura and without status migrainosus  G43.019 346.11 ketorolac tromethamine (TORADOL) 60 mg/2 mL soln      KETOROLAC TROMETHAMINE INJ      gabapentin (NEURONTIN) 100 mg capsule      CANCELED: SD THER/PROPH/DIAG INJECTION, SUBCUT/IM   3. New persistent daily headache  G44.52 339.42          I attest that 40 minutes was spent on today's visit reviewing medical records and diagnostic testing deemed pertinent to this patient's care, along with direct time spent at patient's visit including the history, physical assessment and plan, discussing diagnosis and management along with documentation.       HPI  Historical Data  Patient is known to the practice and was previously seen by Dr. Jorge Schilder    Neurologic diagnosis  Multiple sclerosis  Essential tremor  Lumbar radiculopathy    Other significant comorbid conditions/concerns  History of prostate cancer status post surgical resection      Interim Data:     Patient is being worked in today for the following reason:  Patient has had an acute headache now for several days with slurred speech   Started  Monday a week ago   Saw PCP Thursday and told to take tylenol    If does not take tylenol then has a headache but in between doses of Tylenol his headaches are well controlled   States it started at the back of his head on the right went behind his ear and came forward     He denies any other associated symptoms he states that his forehead felt warm today but he did not take his temperature            Multiple sclerosis    Was most recently seen by Dr. Goldie Jiménez March 2022 1 is invited to review that for details    Current DMT  Aubagio 14 mg daily    Past DMT  Avonex       Rt arm and leg pain along with reduced mobility  Bowels: [place] prostate in [place] and has trouble ever since  Bladder: frequent bladder, no seeing a urologist    Mood: depressed, anxious, +nightmares w active dreams, no halluciantions; + + + cries easily    Cognition: + memory and concentration, processing    Vision  Visual disturbances of wavy lines  Had his prescription changed and its better when he wears his glasses worse when he takes his glasses off    Patient presently is not working and is applying for disability he was involved with  as best I understand it      Pertinent diagnostic data    Results from [facility] encounter on 03/12/21    MRI CERV SPINE W WO CONT    Impression  1. Slight progression of cord disease likely relate to the known demyelination  without enhancing component. 2. Extrinsic disease mainly left side as above. MRI [facility] SPINE W WO CONT    Impression  Line  1. Diffuse mild spondylosis and disc degeneration without focal findings. 2. No significant intrinsic cord lesion no enhancement. MRI BRAIN W WO CONT    Impression  1. Stable burden of white matter disease known to be demyelination. 2. Optic neuritis. February 23, 2010 MRI of the cervical spine  IMPRESSION:   1. Tiny focus of T2 hyperintensity in the posterior spinal cord at C1 does   not have associated enhancement and has not significantly changed. No other   cervical spinal cord abnormality demonstrated. 2. Mild diffuse degenerative disc findings with minimal stenosis. February 8, 2010 MRI of the cervical spine  IMPRESSION:   1. Persistent cord signal abnormality posterior to C2. Differential   diagnosis includes infectious inflammatory or demyelinating etiologies.    Tumor is possible but felt to be less likely   2. Question second cord signal abnormality posterior to C3-C4   3. Patient is claustrophobic and if further MR imaging is required for the   clinical diagnosis conscious sedation would be required    January 8, 2010 MRI of the cervical spine  IMPRESSION:   1. Subtle cord signal abnormality posterior to C2 for which complete   cervical spine MRI with and without contrast is recommended     January 11, 2009 MRI of the brain with and without contrast  IMPRESSION: Prominent bilateral periventricular white matter signal changes   with focus of enhancement adjacent to right atrium. Consider demyelinating   processes. Spinal tap completed March 4, 2010 supportive of MS with 9 oligoclonal banding, elevated myelin basic protein, elevated IgG synthesis rate, elevated IgG and IgG index and elevated CSF IgG to albumin ratio             No Known Allergies    Current Outpatient Medications   Medication Sig    rosuvastatin (CRESTOR) 10 mg tablet Take 10 mg by mouth daily. ketorolac tromethamine (TORADOL) 60 mg/2 mL soln 2 mL by IntraMUSCular route once for 1 dose. gabapentin (NEURONTIN) 100 mg capsule 1 to 2 tablets 3 times daily as needed headache  Indications: neuropathic pain    teriflunomide (Aubagio) 14 mg tablet TAKE 1 TABLET BY MOUTH 1 TIME A DAY WITH DINNER. Linzess 145 mcg cap capsule TAKE 1 CAPSULE BY MOUTH EVERY DAY    naproxen sodium (ALEVE) 220 mg tablet Take 220 mg by mouth as directed. Take 30 minutes before Avonex injections. multivitamins-minerals-lutein (CENTRUM SILVER) Tab Take  by mouth. Cholecalciferol, Vitamin D3, (VITAMIN D3) 1,000 unit cap Take  by mouth. No current facility-administered medications for this visit.        Past medical history/surgical history, family history, and social history have been reviewed for today's visit      ROS    A ten system review of constitutional, cardiovascular, respiratory, musculoskeletal, endocrine, skin, [address] [place] [address], genitourinary, psychiatric and neurologic systems was obtained and is unremarkable except as mentioned under HPI          EXAMINATION:     Visit Vitals  BP (!) 158/82 (BP 1 Location: Left upper arm, BP Patient Position: Sitting, BP Cuff Size: Adult)   Pulse 78   Temp 97.5 °F (36.4 °C) (Temporal)   Resp 16   Ht 5' 4\" (1.626 m)   Wt 192 lb 12.8 oz (87.5 kg)   SpO2 98%   BMI 33.09 kg/m²         General appearance: Patient is well-developed and well-nourished in no apparent distress and well groomed. Psych/mental health:  Affect: Appropriate    PHQ  3 most recent PHQ Screens 7/18/2022   Little interest or pleasure in doing things Not at all   Feeling down, depressed, irritable, or hopeless Not at all   Total Score PHQ 2 0       HEENT:   Normocephalic  With evidence of trauma: No  Full range of motion head neck: Yes  Tenderness to palpation of the head neck region: none      Cardiovascular:     Extremities warm to touch: yes  Extremity swelling: No  Discoloration: No  Evidence of PVD: No    Respiratory:   Dyspnea on exertion: No   Abnormal effort on casual observation: No   Use of portable oxygen: No   Evidence of cyanosis: No     Musculoskeletal:   Evidence of significant bone deformities: No   Spinal curvature: No     Integumentary:    Obvious bruising: No   Lacerations or discoloration on casual observation: No       Neurological Examination:   Mental Status:        MMSE  No flowsheet data found.      Formal testing was not completed       Alert oriented and appropriate to general conversation  Slowed processing on general observation; seems to have difficulty processing complex information  Followed conversation and responded seemingly appropriate throughout the office visit  No word finding difficulties noted on casual observation  Able to follow directions without difficulty     Cranial Nerves:    EOMs intact  No nystagmus is appreciated  Facial motor intact bilaterally  Hearing intact to conversation  Voice with normal projection, no evidence of secretion pooling  Shoulder shrug intact bilaterally  No tongue deviation appreciated     Motor:   Normal bulk  No tremor appreciated on today's exam  No abnormal movements appreciated on today's exam  Moves extremities spontaneously and with purpose  5/5 x 4      Sensation: Intact to light touch    Coordination/Cerebellar:   Grossly intact    Gait: Ambulates independently    Reflexes: Symmetrical    Fall risk assessment  Fall Risk Assessment, last 12 mths 2/26/2021   Able to walk? Yes   Fall in past 12 months? 0   Do you feel unsteady? 0   Are you worried about falling 0           Follow-up and Dispositions    Return in about 6 months (around 1/18/2023) for In office appointment with Dr Jorge Schilder.            Jean Castillo, MS, ANP-BC, [facility]

## 2022-07-18 NOTE — PROGRESS NOTES
Chief Complaint   Patient presents with    Follow-up     pain in the back of the head to the forehead and started last monday and for the last couple of days it has gotten worse and wife states that his speech will be sluggish

## 2022-07-18 NOTE — PATIENT INSTRUCTIONS
As per our discussion    Regarding your headache is given you a shot of Toradol today in the office  I have sent a prescription for gabapentin 1 to 2 tablets up to 3 times a day as needed for headache to [facility]  Do not take any more over-the-counter medication for your headaches and migraines    If your headaches are no better in 1 week please notify the office and we will set you up to have a brain scan completed  If things get worse instead of better please go to nearest emergency room for evaluation    You need to take all of your medications as prescribed including your cholesterol medicines  Elevated cholesterol can lead to such things such as heart attack or stroke so please take all your medicines as prescribed        Office Policies      o Appointments  Please make sure that you arrive for your next appointment at least 15 minutes prior to your appointment time. If for some reason you are going to be late please notify the office to determine if you need to be rescheduled or we can adjust your appointment time      o Phone calls/patient messages:  Please allow up to 24 hours for someone in the office to contact you about your call or message. Be mindful your provider may be out of the office or your message may require further review. We encourage you to use [email or web address] for your messages as this is a faster, more efficient way to communicate with our office    o Medication Refills:  Prescription medications require up to 48 business hours to process. We encourage you to use [email or web address] for your refills. For controlled medications: Please allow up to 72 business hours to process. Certain medications may require you to  a written prescription at our office. NO narcotic/controlled medications will be prescribed after 4pm Monday through Friday or on weekends    o Form/Paperwork Completion:  We ask that you allow 7-14 business days.  You may also download your forms to [email or web address] to have your doctor print off.

## 2022-07-18 NOTE — ASSESSMENT & PLAN NOTE
Seemingly stable continue on Aubagio 14 mg/day    Therapeutic blood work was ordered    When I saw him last back in 2021 there was some progression on MRI scan and we talked about changing DMT but for now he prefers to stay on the Aubagio

## 2022-07-18 NOTE — LETTER
7/18/2022    Patient: Augustine Romeo   YOB: 1965   Date of Visit: 7/18/2022     Shayy Villegas MD  [facility]  [address]  [address] [phone]  Via Fax: [phone]    Dear Shayy Villegas MD,      Thank you for referring Mr. Rosa Maria Jon to [address] [place] [address] for evaluation. My notes for this consultation are attached. If you have questions, please do not hesitate to call me. I look forward to following your patient along with you.       Sincerely,    Giovanni Richardson NP

## 2022-12-12 ENCOUNTER — TELEPHONE (OUTPATIENT)
Dept: NEUROLOGY | Age: 57
End: 2022-12-12

## 2022-12-12 NOTE — TELEPHONE ENCOUNTER
I called the patient, no answer, advised the patient by leaving a message that we cannot follow the MS by blood test, and he just had a scan about a year and a half ago, and most of his headaches seem more tension related, his scan showed no lesion last year, so we will decide on need for further scanning and I see him in February or 2 months from now.

## 2022-12-12 NOTE — TELEPHONE ENCOUNTER
Patient called stating that he has been having headaches and he would like to discuss it with Dr Terence Munguia. Patient also would like to know if Dr Terence Munguia will put in an order for him to have some blood work done.  Please call [phone]

## 2022-12-12 NOTE — TELEPHONE ENCOUNTER
Spoke with patient. Verified patient with two patient identifiers. States his HA are much better compared to what it used to be but still gets a HA when he gets upset or argues with someone. Has about 6 a month. Is on Aubagio and uses Gabapentin 100 mg only once for the headache, feels high if he takes more. Asking for blood work to check the status of his MS and wondering if this will give the answer why he still has Headaches at times. Has FU appt 2-. Pls advise.

## 2023-02-16 ENCOUNTER — OFFICE VISIT (OUTPATIENT)
Dept: NEUROLOGY | Age: 58
End: 2023-02-16
Payer: MEDICARE

## 2023-02-16 VITALS
OXYGEN SATURATION: 98 % | HEART RATE: 71 BPM | DIASTOLIC BLOOD PRESSURE: 84 MMHG | HEIGHT: 64 IN | RESPIRATION RATE: 15 BRPM | BODY MASS INDEX: 30.05 KG/M2 | WEIGHT: 176 LBS | SYSTOLIC BLOOD PRESSURE: 130 MMHG

## 2023-02-16 DIAGNOSIS — M54.16 LUMBAR BACK PAIN WITH RADICULOPATHY AFFECTING RIGHT LOWER EXTREMITY: ICD-10-CM

## 2023-02-16 DIAGNOSIS — G35 MULTIPLE SCLEROSIS (HCC): Primary | ICD-10-CM

## 2023-02-16 DIAGNOSIS — G25.0 BENIGN ESSENTIAL TREMOR: ICD-10-CM

## 2023-02-16 DIAGNOSIS — Z51.81 THERAPEUTIC DRUG MONITORING: ICD-10-CM

## 2023-02-16 NOTE — PROGRESS NOTES
1. Have you been to the ER, urgent care clinic since your last visit? Hospitalized since your last visit? No.    2. Have you seen or consulted any other health care providers outside of the [address] [place] [address] since your last visit? Include any pap smears or colon screening.    No.        Chief Complaint   Patient presents with    Multiple Sclerosis     6 month- pt denies any symptoms [Normal Conjunctiva] : the conjunctiva exhibited no abnormalities [Normal Jugular Venous A Waves Present] : normal jugular venous A waves present [Normal Jugular Venous V Waves Present] : normal jugular venous V waves present [Normal Oral Mucosa] : normal oral mucosa [No Jugular Venous Palacios A Waves] : no jugular venous palacios A waves [Heart Sounds] : normal S1 and S2 [Murmurs] : no murmurs present [Arterial Pulses Normal] : the arterial pulses were normal [Edema] : no peripheral edema present [Abdomen Soft] : soft [Auscultation Breath Sounds / Voice Sounds] : lungs were clear to auscultation bilaterally [Abdomen Tenderness] : non-tender [Cyanosis, Localized] : no localized cyanosis [Nail Clubbing] : no clubbing of the fingernails [Petechial Hemorrhages (___cm)] : no petechial hemorrhages [No Venous Stasis] : no venous stasis [] : no rash [Oriented To Time, Place, And Person] : oriented to person, place, and time [FreeTextEntry1] : obese

## 2023-02-16 NOTE — LETTER
2/16/2023    Patient: Kennedi Beckett   YOB: 1965   Date of Visit: 2/16/2023     Iris Ribeiro MD  [facility]  [address]  [address] [address]  Via Fax: [phone]    Dear Iris Ribeiro MD,      Thank you for referring Mr. Jovi Benton to [address] [place] [address] for evaluation. My notes for this consultation are attached. 1. Have you been to the ER, urgent care clinic since your last visit? Hospitalized since your last visit? No.    2. Have you seen or consulted any other health care providers outside of the [address] [place] [address] since your last visit? Include any pap smears or colon screening. No.        Chief Complaint   Patient presents with    Multiple Sclerosis     6 month- pt denies any symptoms           Consult    Subjective:     Kennedi Beckett is a 59 y.o. Right-handed Afro-American male seen for evaluation at the request of Dr. Juanito Adams for evaluation of his multiple sclerosis, and he has been stable and followed by the nurse practitioner over the last year, and had no major flareups or exacerbations, and his last MRI scans done about 2 years ago remained stable of the brain and cervical and thoracic spine, and he is not anxious to have it repeated yet, he wants to wait till 3 years, so we will wait and do it then. Patient states that he has had not had any new focal neurologic symptoms, nor has he developed any signs of a relapse or exacerbation of his MS, and 1 year ago he had MRIs of the brain and cervical spine and thoracic spine that showed stable MS disease without any acute lesions, and he is on Aubagio and tolerating it well without any side effects so far, and is trying to exercise and stay active and healthy and take his vitamins and vitamin D on a regular basis. Because of this we will continue his current medications and refill sent in for the patient today.   He just had blood work done that was normal of the CBC differential and CMP we did not do any more blood work today. His exam remained stable with a mild quadriplegia and limp on the right side. That may be because of his knee and he seen several orthopedist for it. He is trying for disability, and with his MS he certainly might be a candidate for that, but it is hard to explain his knee pain, so I will do an x-ray of the knee check arthritis titers, and possibly refer him to a knee specialist if he is not seeing anybody in 2 years. He also complains of fatigue and urinary incontinence, usually in the morning when he first wakes up and he cannot make it to the bathroom in time since his prostate surgery last March 2020 for prostate cancer, and they want to do seed implant but he refuses so far for that. He feels generally weak, fatigues quickly, has some cognitive impairment and mental slowing and with the incontinence and his MS wants to know whether he qualifies for disability because of his prostate cancer and MS. I told him that he could be a candidate for fatigue and mental slowing, and possibly the latter problem, we will try to help him, but I could not promise him again today. Patient has been on Aubagio for the last 2 years and doing well, and not had any major recurrent neurological symptoms. He was on Avonex once a week, and switched to Aubagio 1 year ago, and has remained stable since. Patient was seen for increasing unsteadiness in walking, being more clumsy, no longer able to move fast, and feeling that his right side may be a little bit worse as far as his weakness and numbness for the last several weeks. His back pain is gotten much better with physical therapy and treatment with orthopedist that he no longer has a problem with that. He has problem with neurogenic bladder. He does not take Cialis for his erectile dysfunction anymore either. He has no difficulty with his bowel movements, but does have to get up to void 2 times a night.  He has a slight tremor in his hands when he tries to hold things or grab things. It does not occur at rest and is not associated with gait abnormalities. s MRI scan showed a lesion in his right optic nerve that was acute, with stable slightly progressive white matter disease typical of his MS. He had no other new neurological symptoms. He does have erectile dysfunction, and had been given shot therapy by a urologist. He is on Aubagio. He denies any side effect from the injections, other than a rare flulike episode. This is his first exacerbation in many years. He has had no other new focal weakness, visual problems, sensory loss, cognitive issues, gait problems, fevers or trauma or other new neurological symptoms. A complete review of systems and symptoms he's had no other new medical problems, complications or illnesses except for the erectile dysfunction and MS and tremor. 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 Family History   Problem Relation Age of Onset    Hypertension Mother     Heart Disease Mother     High Cholesterol Mother     OSTEOARTHRITIS Father     Hypertension Brother     Cancer Brother         lung    Hypertension Brother     Cancer Brother         prostate    Hypertension Sister     Other Sister         cerebral aneurysm      Social History     Tobacco Use    Smoking status: Never    Smokeless tobacco: Never   Substance Use Topics    Alcohol use:  No Current Outpatient Medications   Medication Sig Dispense Refill    gabapentin (NEURONTIN) 100 mg capsule 1 to 2 tablets 3 times daily as needed headache  Indications: neuropathic pain 180 Capsule 0    teriflunomide (Aubagio) 14 mg tablet TAKE 1 TABLET BY MOUTH 1 TIME A DAY WITH DINNER. 30 Tablet 11    multivitamins-minerals-lutein (MEN'S CENTRUM SILVER WITH LUTEIN) tab tablet Take 1 Tablet by mouth daily.  cholecalciferol (VITAMIN D3) 25 mcg (1,000 unit) cap Take 1,000 Units by mouth daily. No Known Allergies     Review of Systems:  A comprehensive review of systems was negative except for: Eyes: positive for visual disturbance  Genitourinary: positive for erectile dysfunction  Musculoskeletal: positive for myalgias, arthralgias and stiff joints  Neurological: positive for tremor and visual loss    Vitals:    02/16/23 1018 02/16/23 1027 02/16/23 1111   BP: (!) 142/98 (!) 142/98 130/84   Pulse: 71     Resp: 15     SpO2: 98%     Weight: 176 lb (79.8 kg)     Height: 5' 4\" (1.626 m)       Objective:     I      NEUROLOGICAL EXAM:    Appearance: The patient is well developed, well nourished, provides a coherent history and is in no acute distress. Mental Status: Oriented to time, place and person, and the president. Mood and affect appropriate. Speech is fluent without aphasia or dysarthria   Cranial Nerves:   Intact visual fields. Fundi show bilateral optic pallor, right side greater than left , with mild optic atrophy seen bilaterally, but no vascular lesions of the retina seen. . Visual acuity is difficult because the patient has marked presbyopia. Coral GRAJEDA EOM's full, but patient has strabismus with his right eye deviating outward exophoria, no nystagmus, no ptosis. Facial sensation is normal. Corneal reflexes are not tested. Facial movement is symmetric. Hearing is normal bilaterally. Palate is midline with normal sternocleidomastoid and trapezius muscles are normal. Tongue is midline.     Neck is supple without meningismus or bruits. Temporal arteries are not tender or enlarged   Motor:  4/5 strength in upper and lower proximal and distal muscles, but the right lower extremity shows strength about 4/5, and he has decreased rapid alternating movement in his right leg, and a trace in his right hand with slight weakness in the right upper extremity. Normal bulk and increased tone. No fasciculations. Rapid alternate movement is slightly slow bilaterally   Reflexes:   Deep tendon reflexes 2+/4 and symmetrical.  No babinski or clonus present  Straight leg raising test is negative in the sitting position bilaterally  He has some mild percussion tenderness over the lower lumbar spine   Sensory:   Normal to touch, pinprick and vibration and temperature . Gait:  Abnormal gait, the patient has slight stiffness in his legs and slightly clumsy in his walking, particular on the right side which has a spastic right hemiparesis, and he tends to stumble occasionally on the right leg. He also complains of pain when he bends the knee or tries to walk on the leg in addition   Tremor:   Mild bilateral intention tremor noted right greater than left. Cerebellar:   Mildly abnormal Romberg and tandem cerebellar signs present. Neurovascular:  Normal heart sounds and regular rhythm, peripheral pulses decreased, and no carotid bruits. Assessment:         Plan:     Patient with MS, and he is not had any clear exacerbating or relapsing symptoms over the last year, and stable on Aubagio, and his last MRI scans of the brain and cervical and thoracic spine were all stable 2 year ago, and we will not repeat them yet, but he will continue his medication and he just had recent blood levels done so no blood monitoring was needed.   Patient with problem of increasing knee pain, that I told him most likely reflects degenerative arthritis, possibly due to the strain on his knee because of his MS and abnormal gait, but we need an x-ray to see if there is anything going on the knee and check his arthritis panel and he will probably need referral back to a knee surgeon  Patient with new problem of urinary incontinence combined with increasing fatigue and generalized weakness and some mild cognitive impairment want to know whether he qualifies for disability that told him I could not guarantee it but he certainly would be a candidate for it if he wanted to try, but [employer] makes her own mind up with their own examiners. His repeat MRI scans of the brain and cervical and thoracic spine done April 2, 2021 all showed stable disease without new enhancing lesions but he still has a lot of MS lesions that are old. We also did complete metabolic studies to rule out other causes of his symptoms, and they were okay  Patient sciatica is much better with exercise program, at this time no further treatment needed. Patient with refractory erectile dysfunction, will try to increase his cialis as tolerated. Patient had prostatectomy done March 2020 for prostate cancer and still has incontinence. Patient encouraged to stay physically mentally active, do not smoke, take his vitamins and vitamin D every day, and call us if there is any problem. Patient has neurogenic bladder secondary to his MS, most likely a spastic bladder with urge incontinence  He is to get blood work today, making sure that there is no side effect from his Aubagio either as far as hematologic or liver function abnormalities  Patient's essential tremor doesn't seem significant amount to treat yet, we will follow him he will call if any problems  We discussed other disease modifying drugs in view of his mild progression on his MRI scan, but the patient wants to continue his current therapy  He is encouraged to make sure that he take a multivitamin vitamin D on a regular basis, and remained medically and physically active.   29 minutes spent with the patient and his wife going over all of these problems in detail with them, discussing his diagnosis prognosis treatment options and testing needed, and reviewing all of his old notes looking for knee problems which the do not seem to see any notes justifying knee problems. Followup in 6 months time or earlier if needed    Signed By: Len Malhotra MD     February 16, 2023                 If you have questions, please do not hesitate to call me. I look forward to following your patient along with you.       Sincerely,    Len Malhotra MD

## 2023-02-17 NOTE — PROGRESS NOTES
Consult    Subjective:     Amber Prabhakar is a 59 y.o. Right-handed Afro-American male seen for evaluation at the request of Dr. Vero You for evaluation of his multiple sclerosis, and he has been stable and followed by the nurse practitioner over the last year, and had no major flareups or exacerbations, and his last MRI scans done about 2 years ago remained stable of the brain and cervical and thoracic spine, and he is not anxious to have it repeated yet, he wants to wait till 3 years, so we will wait and do it then. Patient states that he has had not had any new focal neurologic symptoms, nor has he developed any signs of a relapse or exacerbation of his MS, and 1 year ago he had MRIs of the brain and cervical spine and thoracic spine that showed stable MS disease without any acute lesions, and he is on Aubagio and tolerating it well without any side effects so far, and is trying to exercise and stay active and healthy and take his vitamins and vitamin D on a regular basis. Because of this we will continue his current medications and refill sent in for the patient today. He just had blood work done that was normal of the CBC differential and CMP we did not do any more blood work today. His exam remained stable with a mild quadriplegia and limp on the right side. That may be because of his knee and he seen several orthopedist for it. He is trying for disability, and with his MS he certainly might be a candidate for that, but it is hard to explain his knee pain, so I will do an x-ray of the knee check arthritis titers, and possibly refer him to a knee specialist if he is not seeing anybody in 2 years. He also complains of fatigue and urinary incontinence, usually in the morning when he first wakes up and he cannot make it to the bathroom in time since his prostate surgery last March 2020 for prostate cancer, and they want to do seed implant but he refuses so far for that.   He feels generally weak, fatigues quickly, has some cognitive impairment and mental slowing and with the incontinence and his MS wants to know whether he qualifies for disability because of his prostate cancer and MS. I told him that he could be a candidate for fatigue and mental slowing, and possibly the latter problem, we will try to help him, but I could not promise him again today. Patient has been on Aubagio for the last 2 years and doing well, and not had any major recurrent neurological symptoms. He was on Avonex once a week, and switched to Aubagio 1 year ago, and has remained stable since. Patient was seen for increasing unsteadiness in walking, being more clumsy, no longer able to move fast, and feeling that his right side may be a little bit worse as far as his weakness and numbness for the last several weeks. His back pain is gotten much better with physical therapy and treatment with orthopedist that he no longer has a problem with that. He has problem with neurogenic bladder. He does not take Cialis for his erectile dysfunction anymore either. He has no difficulty with his bowel movements, but does have to get up to void 2 times a night. He has a slight tremor in his hands when he tries to hold things or grab things. It does not occur at rest and is not associated with gait abnormalities. s MRI scan showed a lesion in his right optic nerve that was acute, with stable slightly progressive white matter disease typical of his MS. He had no other new neurological symptoms. He does have erectile dysfunction, and had been given shot therapy by a urologist. He is on Aubagio. He denies any side effect from the injections, other than a rare flulike episode. This is his first exacerbation in many years. He has had no other new focal weakness, visual problems, sensory loss, cognitive issues, gait problems, fevers or trauma or other new neurological symptoms.     A complete review of systems and symptoms he's had no other new medical problems, complications or illnesses except for the erectile dysfunction and MS and tremor. 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 Family History   Problem Relation Age of Onset    Hypertension Mother     Heart Disease Mother     High Cholesterol Mother     OSTEOARTHRITIS Father     Hypertension Brother     Cancer Brother         lung    Hypertension Brother     Cancer Brother         prostate    Hypertension Sister     Other Sister         cerebral aneurysm      Social History     Tobacco Use    Smoking status: Never    Smokeless tobacco: Never   Substance Use Topics    Alcohol use: No       Current Outpatient Medications   Medication Sig Dispense Refill    gabapentin (NEURONTIN) 100 mg capsule 1 to 2 tablets 3 times daily as needed headache  Indications: neuropathic pain 180 Capsule 0    teriflunomide (Aubagio) 14 mg tablet TAKE 1 TABLET BY MOUTH 1 TIME A DAY WITH DINNER. 30 Tablet 11    multivitamins-minerals-lutein (MEN'S CENTRUM SILVER WITH LUTEIN) tab tablet Take 1 Tablet by mouth daily. cholecalciferol (VITAMIN D3) 25 mcg (1,000 unit) cap Take 1,000 Units by mouth daily.           No Known Allergies     Review of Systems:  A comprehensive review of systems was negative except for: Eyes: positive for visual disturbance  Genitourinary: positive for erectile dysfunction  Musculoskeletal: positive for myalgias, arthralgias and stiff joints  Neurological: positive for tremor and visual loss    Vitals:    02/16/23 1018 02/16/23 1027 02/16/23 1111   BP: (!) 142/98 (!) 142/98 130/84   Pulse: 71     Resp: 15     SpO2: 98%     Weight: 176 lb (79.8 kg)     Height: 5' 4\" (1.626 m)       Objective:     I      NEUROLOGICAL EXAM:    Appearance: The patient is well developed, well nourished, provides a coherent history and is in no acute distress. Mental Status: Oriented to time, place and person, and the president. Mood and affect appropriate. Speech is fluent without aphasia or dysarthria   Cranial Nerves:   Intact visual fields. Fundi show bilateral optic pallor, right side greater than left , with mild optic atrophy seen bilaterally, but no vascular lesions of the retina seen. . Visual acuity is difficult because the patient has marked presbyopia. Yeni GRAJEDA, EOM's full, but patient has strabismus with his right eye deviating outward exophoria, no nystagmus, no ptosis. Facial sensation is normal. Corneal reflexes are not tested. Facial movement is symmetric. Hearing is normal bilaterally. Palate is midline with normal sternocleidomastoid and trapezius muscles are normal. Tongue is midline. Neck is supple without meningismus or bruits. Temporal arteries are not tender or enlarged   Motor:  4/5 strength in upper and lower proximal and distal muscles, but the right lower extremity shows strength about 4/5, and he has decreased rapid alternating movement in his right leg, and a trace in his right hand with slight weakness in the right upper extremity. Normal bulk and increased tone. No fasciculations. Rapid alternate movement is slightly slow bilaterally   Reflexes:   Deep tendon reflexes 2+/4 and symmetrical.  No babinski or clonus present  Straight leg raising test is negative in the sitting position bilaterally  He has some mild percussion tenderness over the lower lumbar spine   Sensory:   Normal to touch, pinprick and vibration and temperature .    Gait:  Abnormal gait, the patient has slight stiffness in his legs and slightly clumsy in his walking, particular on the right side which has a spastic right hemiparesis, and he tends to stumble occasionally on the right leg. He also complains of pain when he bends the knee or tries to walk on the leg in addition   Tremor:   Mild bilateral intention tremor noted right greater than left. Cerebellar:   Mildly abnormal Romberg and tandem cerebellar signs present. Neurovascular:  Normal heart sounds and regular rhythm, peripheral pulses decreased, and no carotid bruits. Assessment:         Plan:     Patient with MS, and he is not had any clear exacerbating or relapsing symptoms over the last year, and stable on Aubagio, and his last MRI scans of the brain and cervical and thoracic spine were all stable 2 year ago, and we will not repeat them yet, but he will continue his medication and he just had recent blood levels done so no blood monitoring was needed. Patient with problem of increasing knee pain, that I told him most likely reflects degenerative arthritis, possibly due to the strain on his knee because of his MS and abnormal gait, but we need an x-ray to see if there is anything going on the knee and check his arthritis panel and he will probably need referral back to a knee surgeon  Patient with new problem of urinary incontinence combined with increasing fatigue and generalized weakness and some mild cognitive impairment want to know whether he qualifies for disability that told him I could not guarantee it but he certainly would be a candidate for it if he wanted to try, but [employer] makes her own mind up with their own examiners. His repeat MRI scans of the brain and cervical and thoracic spine done April 2, 2021 all showed stable disease without new enhancing lesions but he still has a lot of MS lesions that are old.   We also did complete metabolic studies to rule out other causes of his symptoms, and they were okay  Patient sciatica is much better with exercise program, at this time no further treatment needed. Patient with refractory erectile dysfunction, will try to increase his cialis as tolerated. Patient had prostatectomy done March 2020 for prostate cancer and still has incontinence. Patient encouraged to stay physically mentally active, do not smoke, take his vitamins and vitamin D every day, and call us if there is any problem. Patient has neurogenic bladder secondary to his MS, most likely a spastic bladder with urge incontinence  He is to get blood work today, making sure that there is no side effect from his Aubagio either as far as hematologic or liver function abnormalities  Patient's essential tremor doesn't seem significant amount to treat yet, we will follow him he will call if any problems  We discussed other disease modifying drugs in view of his mild progression on his MRI scan, but the patient wants to continue his current therapy  He is encouraged to make sure that he take a multivitamin vitamin D on a regular basis, and remained medically and physically active. 29 minutes spent with the patient and his wife going over all of these problems in detail with them, discussing his diagnosis prognosis treatment options and testing needed, and reviewing all of his old notes looking for knee problems which the do not seem to see any notes justifying knee problems.   Followup in 6 months time or earlier if needed    Signed By: Dilip Hou MD     February 16, 2023

## 2023-02-21 DIAGNOSIS — N31.8 SPASTIC NEUROGENIC BLADDERS: ICD-10-CM

## 2023-02-21 DIAGNOSIS — G25.0 BENIGN ESSENTIAL TREMOR: ICD-10-CM

## 2023-02-21 DIAGNOSIS — N52.1 ERECTILE DYSFUNCTION DUE TO DISEASES CLASSIFIED ELSEWHERE: ICD-10-CM

## 2023-02-21 DIAGNOSIS — G35 MS (MULTIPLE SCLEROSIS) (HCC): ICD-10-CM

## 2023-02-21 DIAGNOSIS — G35 MULTIPLE SCLEROSIS (HCC): ICD-10-CM

## 2023-02-21 DIAGNOSIS — M54.16 LUMBAR BACK PAIN WITH RADICULOPATHY AFFECTING RIGHT LOWER EXTREMITY: ICD-10-CM

## 2023-02-21 DIAGNOSIS — K59.2 CONSTIPATION DUE TO NEUROGENIC BOWEL: ICD-10-CM

## 2023-02-21 DIAGNOSIS — K59.00 CONSTIPATION DUE TO NEUROGENIC BOWEL: ICD-10-CM

## 2023-03-14 ENCOUNTER — TELEPHONE (OUTPATIENT)
Dept: NEUROLOGY | Age: 58
End: 2023-03-14

## 2023-03-14 DIAGNOSIS — N52.1 ERECTILE DYSFUNCTION DUE TO DISEASES CLASSIFIED ELSEWHERE: ICD-10-CM

## 2023-03-14 DIAGNOSIS — N31.8 SPASTIC NEUROGENIC BLADDERS: ICD-10-CM

## 2023-03-14 DIAGNOSIS — K59.2 CONSTIPATION DUE TO NEUROGENIC BOWEL: ICD-10-CM

## 2023-03-14 DIAGNOSIS — G35 MULTIPLE SCLEROSIS (HCC): ICD-10-CM

## 2023-03-14 DIAGNOSIS — M54.16 LUMBAR BACK PAIN WITH RADICULOPATHY AFFECTING RIGHT LOWER EXTREMITY: ICD-10-CM

## 2023-03-14 DIAGNOSIS — G35 MS (MULTIPLE SCLEROSIS) (HCC): ICD-10-CM

## 2023-03-14 DIAGNOSIS — G25.0 BENIGN ESSENTIAL TREMOR: ICD-10-CM

## 2023-03-14 DIAGNOSIS — K59.00 CONSTIPATION DUE TO NEUROGENIC BOWEL: ICD-10-CM

## 2023-03-14 NOTE — TELEPHONE ENCOUNTER
Need printed Rx for Augagio, faxed to [facility] (Pharmacy option listed as [facility])

## 2023-03-14 NOTE — TELEPHONE ENCOUNTER
Received fax from [facility] requesting Aubagio Rx be faxed to them with physician signature. Faxed to [phone]. Confirmation received. Forms sent for scanning to [facility].

## 2023-03-20 ENCOUNTER — TELEPHONE (OUTPATIENT)
Dept: NEUROLOGY | Age: 58
End: 2023-03-20

## 2023-03-20 NOTE — TELEPHONE ENCOUNTER
Pt states he still has not gotten his aubagio. Pharmacy states they are still waiting on Dr. Kathy Hyde approval. Has the paperwork with [employer] been completed?      Please call pt with status [phone]

## 2023-03-21 NOTE — TELEPHONE ENCOUNTER
Aubagio renewal sent via link bird to [facility] JODI     CASE   [id number]    [facility] request. Turnaround time for review of a PA request is dependent upon insurance plan and can range from 24 hours to 5 calendar days. You may close this dialog, return to your dashboard, and perform other tasks. To check for an update later, go to the \"Search\" tab and click on \"Search [facility] Detailed Status. \"    Email sent to Bel Gr at [employer] to advise this has been submitted and is pending approval.

## 2023-03-31 NOTE — TELEPHONE ENCOUNTER
Lizabeth approval  Case [id number]  3/27/23 to 3/27/24     Right F[facility] to [facility] and [employer]. Letter scanned to Media.

## 2023-07-10 ENCOUNTER — TELEPHONE (OUTPATIENT)
Age: 58
End: 2023-07-10

## 2023-07-10 DIAGNOSIS — G35 MULTIPLE SCLEROSIS (HCC): Primary | ICD-10-CM

## 2023-07-10 RX ORDER — TERIFLUNOMIDE 14 MG/1
TABLET, FILM COATED ORAL
Qty: 90 TABLET | Refills: 3 | Status: ACTIVE | OUTPATIENT
Start: 2023-07-10 | End: 2023-07-14 | Stop reason: ALTCHOICE

## 2023-07-10 NOTE — TELEPHONE ENCOUNTER
Re:  [address] email to Kasey Polanco / [facility] the following:     - I sent message to Dr. Gerhardt Richter today to escribe it to [facility].      New insurance [facility]     In EPIC - shows 3 plans:     VINCENT, [address] ([address]) ([facility])  Covered: Retail, Mail Order Unknown: Specialty, Long-Term Care               Member ID: [id number] 1965 - M     Group ID: [id number] [facility], [address] [facility]        [address] [place] [address], [address] [facility] AND MA/RDS Jacob Quinteros)  Covered: Retail, Mail Order Unknown: Specialty, Long-Term Care               Member ID: [id number] 1965 - M     Group ID: [id number]      Group Name: Vito Mays, [address]        [address] [place] [address], [address] [employer] ([facility])  Covered: Retail, Mail Order Unknown: Specialty, Long-Term Care               Member ID: [id number] 1965 - M     Group ID: [id number] [facility], [address]

## 2023-07-12 ENCOUNTER — TELEPHONE (OUTPATIENT)
Age: 58
End: 2023-07-12

## 2023-07-12 NOTE — TELEPHONE ENCOUNTER
Pt lvm requesting to speak with Dr Senia Huffman asap regarding his insurance and them wanting him to do the generic version of the Aubagio. If Pt goes on generic, he will lose all his benefits.  Please call [phone]

## 2023-07-14 ENCOUNTER — TELEPHONE (OUTPATIENT)
Age: 58
End: 2023-07-14

## 2023-07-14 DIAGNOSIS — G35 MULTIPLE SCLEROSIS (HCC): Primary | ICD-10-CM

## 2023-07-14 RX ORDER — DIROXIMEL FUMARATE 231 MG/1
1 CAPSULE ORAL 2 TIMES DAILY
Qty: 14 CAPSULE | Refills: 0 | Status: SHIPPED | OUTPATIENT
Start: 2023-07-14 | End: 2023-07-21

## 2023-07-14 RX ORDER — DIROXIMEL FUMARATE 231 MG/1
462 CAPSULE ORAL 2 TIMES DAILY WITH MEALS
Qty: 120 CAPSULE | Refills: 11 | Status: SHIPPED | OUTPATIENT
Start: 2023-07-14

## 2023-07-14 NOTE — TELEPHONE ENCOUNTER
I called the patient, advised him that the best thing to do is either take generic Aubagio and see if he has a side effect or better than that try to make a switch to Vumerity which does have co-pay assistance for the patient and he wants to try the latter so I sent in a new prescription for Vumerity and he will come in and sign the starter form will fill that out also.

## 2023-07-14 NOTE — TELEPHONE ENCOUNTER
? Is there a reason we can give insurance for pt to continue on Aubagio name brand so he keeps his benefits?

## 2023-07-17 ENCOUNTER — TELEPHONE (OUTPATIENT)
Age: 58
End: 2023-07-17

## 2023-07-17 DIAGNOSIS — G35 MULTIPLE SCLEROSIS (HCC): Primary | ICD-10-CM

## 2023-07-17 RX ORDER — TERIFLUNOMIDE 14 MG/1
14 TABLET, FILM COATED ORAL
Qty: 30 TABLET | Refills: 11 | Status: ACTIVE | OUTPATIENT
Start: 2023-07-17

## 2023-07-17 NOTE — TELEPHONE ENCOUNTER
Pt called stating that he does not want to start the Vumerity. Stated that he was able to speak with his insurance company and they advised him that they will cover the Aubagio brand name only, but Pt will have to pay a bit more out of pocket. Pt stated that he is fine with having to pay a bit more because the Aubagio brand name works for him and he does not wish to change it. Asked that Dr Chanda Herman please call in a new script to [facility]. Patient asked that that Dr Chanda Herman please give him a call back to discuss this so that he can make sure we are all on the same page.  [phone]

## 2023-07-17 NOTE — TELEPHONE ENCOUNTER
Does not want Vumerity, wants Aubagio name brand only and ins will cover. Wants Dr. Alaniz Cast approval to do this.

## 2023-07-18 NOTE — TELEPHONE ENCOUNTER
LMVM advising Aubagio name brand only sent in to [address] [place] [address] today, per Dr. Judy Lyman. Advised to call back if any questions. [place] Pregnancy And Lactation Text: This medication is not safe during pregnancy. It is unknown if this medication is excreted in breast milk.

## 2023-07-18 NOTE — TELEPHONE ENCOUNTER
Aubagio notes from [facility] today:     Medication us currently being filled at [facility] and has been routed there. Prior authorization effective through 7/11/24. Please call us with any questions at [phone] opt.  2.      07/18/2023 [address] [facility] Update

## 2023-07-21 ENCOUNTER — TELEPHONE (OUTPATIENT)
Age: 58
End: 2023-07-21

## 2023-07-21 NOTE — TELEPHONE ENCOUNTER
Faxed to [address] [place] [address] [place] Name Brand only, however Extension Germán Cisneros is processing. Faxed form to [phone]. Confirmation received, form sent for scanning to [facility].

## 2023-08-01 ENCOUNTER — TELEPHONE (OUTPATIENT)
Age: 58
End: 2023-08-01

## 2023-08-01 NOTE — TELEPHONE ENCOUNTER
Myleia w/ [employer] called requesting to speak with PA specialist. Wants to know if we have received PA for Vumerity.      Reference key: [id number]

## 2023-08-02 NOTE — TELEPHONE ENCOUNTER
HH note:     Medication us currently being filled at [facility] and has been routed there. Prior authorization effective through 7/11/24. Please call us with any questions at [phone] opt.  2.

## 2023-08-22 ENCOUNTER — TELEPHONE (OUTPATIENT)
Age: 58
End: 2023-08-22

## 2023-08-22 NOTE — TELEPHONE ENCOUNTER
Patient called stating that he was informed that his insurance is requiring him to get a new PA for his Aubagio because Medicare is taking over. Sent e-mail to Radha andrade/ [facility] to take a look into this.  [phone]

## 2023-08-28 NOTE — TELEPHONE ENCOUNTER
Notes from [facility] ([facility]      07/18/2023 Lauryn Levine    Medication us currently being filled at [facility] and has been routed there. Prior authorization effective through 7/11/24. Please call us with any questions at [phone] opt.  2.

## 2023-08-29 ENCOUNTER — TELEPHONE (OUTPATIENT)
Age: 58
End: 2023-08-29

## 2023-08-29 NOTE — TELEPHONE ENCOUNTER
Re: Liliana Barry / [facility] asked if [facility] able to fill it or if it needs to be sent to [facility]  (from a fax I received).

## 2023-11-22 ENCOUNTER — TELEPHONE (OUTPATIENT)
Age: 58
End: 2023-11-22

## 2023-11-22 NOTE — TELEPHONE ENCOUNTER
Pt called stated she would like to have the generic kind of Aubagio due to his insurance not paying for it anymore. Pt stated he needs a call back pertaining this medication because he needs the best generic version of it.     Please call: [phone]

## 2023-11-27 NOTE — TELEPHONE ENCOUNTER
Patient called again regarding the Aubagio. Wants to know if Dr Alvarez can prescribe anything that is similar to Aubagio that his insurance will cover. Please advise. [phone]

## 2023-11-28 NOTE — TELEPHONE ENCOUNTER
[facility] pt needs to call his ins company and ask which medication comparable to Aubagio is on formulary, so we know which meds are covered for him.  Advised to call us once he has the name of the medication.    Call back if any questions.

## 2023-12-06 NOTE — TELEPHONE ENCOUNTER
Teriflunomide is apparently on formulary.    We had ordered Aubagio name brand so ins would cover, but it is not on formulary.    Generic is.  ? Okay to change to generic?  Pls advise.

## 2023-12-06 NOTE — TELEPHONE ENCOUNTER
Patient called stating that he called his insurance but they would not give him a list of medications that were comparable to the Aubagio. Stated they only gave him one name \"Perillunomnde\" (Asked Pt to respell this medication multiple times and got different letters each time) and stated they told him they would fax this information to us for Dr Alvarez to review.    Pt asked if Dr Alvarez could give him a list of medications he would recommend and then Pt will call his insurance company with that list to see what they will cover. Please advise.   [phone]

## 2023-12-07 ENCOUNTER — TELEPHONE (OUTPATIENT)
Age: 58
End: 2023-12-07

## 2023-12-07 DIAGNOSIS — G35 MULTIPLE SCLEROSIS (HCC): ICD-10-CM

## 2023-12-07 RX ORDER — TERIFLUNOMIDE 14 MG/1
14 TABLET, FILM COATED ORAL
Qty: 30 TABLET | Refills: 11 | Status: ACTIVE | OUTPATIENT
Start: 2023-12-07

## 2023-12-07 NOTE — TELEPHONE ENCOUNTER
Per Dr Karl Hicks, change Aubagio to Teriflunomide, due to cost.    Forwarding to [facility].

## 2023-12-07 NOTE — TELEPHONE ENCOUNTER
Spoke with pt, he is requesting an alternative to [place] due to his insurance company not paying for it due to the cost. (\"$2800.00\") He is requesting a call back please.

## 2023-12-07 NOTE — TELEPHONE ENCOUNTER
Spoke with patient.  Verified patient with two patient identifiers.    Advised per Dr. Alvarez, may change to Teriflunomide.    Will send to [facility].    Patient verbalized understanding.

## 2023-12-12 ENCOUNTER — CLINICAL DOCUMENTATION (OUTPATIENT)
Age: 58
End: 2023-12-12

## 2023-12-12 NOTE — PROGRESS NOTES
Received signed and completed forms from provider for [facility] request form. Faxed to [facility] at [phone]. Received fax confirmation.

## 2024-01-12 ENCOUNTER — CLINICAL DOCUMENTATION (OUTPATIENT)
Age: 59
End: 2024-01-12

## 2024-01-12 NOTE — PROGRESS NOTES
Received fax from [facility] specialty pharmacy stating that pt's medication teriflunomide was shipped 01/03/2024.

## 2024-01-17 ENCOUNTER — TELEPHONE (OUTPATIENT)
Age: 59
End: 2024-01-17

## 2024-01-17 DIAGNOSIS — G35 MULTIPLE SCLEROSIS (HCC): Primary | ICD-10-CM

## 2024-01-17 NOTE — TELEPHONE ENCOUNTER
Patient would like a call back regarding his medications for his diagnosis. (He was not willing to give any further information on what it was about and why he needed a call back.)

## 2024-01-18 ENCOUNTER — TELEPHONE (OUTPATIENT)
Age: 59
End: 2024-01-18

## 2024-01-18 RX ORDER — DIROXIMEL FUMARATE 231 MG/1
462 CAPSULE ORAL 2 TIMES DAILY WITH MEALS
Qty: 120 CAPSULE | Refills: 11 | Status: SHIPPED | OUTPATIENT
Start: 2024-01-18

## 2024-01-18 RX ORDER — DIROXIMEL FUMARATE 231 MG/1
1 CAPSULE ORAL 2 TIMES DAILY
Qty: 14 CAPSULE | Refills: 0 | Status: SHIPPED | OUTPATIENT
Start: 2024-01-18 | End: 2024-01-25

## 2024-01-18 NOTE — TELEPHONE ENCOUNTER
Spoke with patient.  Verified patient with two patient identifiers.    States we had him on Aubgio which was too expensive.  Changed him to Teriflunomide which was going to be $450.00 a month, which is too expensive.    He called his [employer] to see which med for MS might be cheaper and they told him we would have to prescribed him something as they cannot tell us which one is cheapest because that would mean they are prescribing meds for pt.    Asking for something for MS that he can afford.  Advised we do not know the cost, but can prescribe another MS drug and see what it comes up at the Pharmacy.    Did discuss Good Rx card also.    Pls advise.

## 2024-01-19 ENCOUNTER — TELEPHONE (OUTPATIENT)
Age: 59
End: 2024-01-19

## 2024-01-19 NOTE — TELEPHONE ENCOUNTER
Pt called back returning 's call, I advised what note stated, Pt verbalized understanding, and I provided the Customer Service Number to [employer].

## 2024-01-19 NOTE — TELEPHONE ENCOUNTER
Vumerity PA    (MS drug) that harness health processes.     Sent email to Vanessa with [employer]     Case ID (CM)  [id number]

## 2024-01-19 NOTE — TELEPHONE ENCOUNTER
I called the patient, no answer, left him a message we can try Vumerity, but that does not work he needs to call the MS [employer] to see if he can get patient assistance from one of the foundation to get his patient assistance, I will prescribe Vumerity to [facility] health and see what happens

## 2024-01-21 ENCOUNTER — CLINICAL DOCUMENTATION (OUTPATIENT)
Age: 59
End: 2024-01-21

## 2024-01-25 ENCOUNTER — TELEPHONE (OUTPATIENT)
Age: 59
End: 2024-01-25

## 2024-01-25 NOTE — TELEPHONE ENCOUNTER
Vumerity form on Dr. Alvarez's desk, does pt want to try Vumerity?      Santy yuen MD sent to Mary Jo Le LPN  Caller: Unspecified (1 week ago)    Mary Jo, can you give me a starter form for Vumerity, to see if they will be any cheaper for him on Monday or Tuesday when I come into the office

## 2024-02-01 ENCOUNTER — TELEPHONE (OUTPATIENT)
Age: 59
End: 2024-02-01

## 2024-02-01 NOTE — TELEPHONE ENCOUNTER
Good Afternoon!  Re: Tyler Harmon [birth year] Vumerity  PA was denied, patient must try/fail Tecfidera.  Note on 1/21 states provider spoke with patient regarding this.  Can you please cancel the 2 orders for the Vumerity?     Thanks!!   Dr Alvarez,   This message is from Vanessa at [facility]

## 2024-03-20 ENCOUNTER — OFFICE VISIT (OUTPATIENT)
Age: 59
End: 2024-03-20
Payer: MEDICARE

## 2024-03-20 VITALS
WEIGHT: 171 LBS | BODY MASS INDEX: 29.19 KG/M2 | TEMPERATURE: 98 F | HEIGHT: 64 IN | SYSTOLIC BLOOD PRESSURE: 138 MMHG | HEART RATE: 66 BPM | OXYGEN SATURATION: 97 % | DIASTOLIC BLOOD PRESSURE: 88 MMHG | RESPIRATION RATE: 16 BRPM

## 2024-03-20 DIAGNOSIS — Z51.81 THERAPEUTIC DRUG MONITORING: ICD-10-CM

## 2024-03-20 DIAGNOSIS — G35 MULTIPLE SCLEROSIS (HCC): ICD-10-CM

## 2024-03-20 DIAGNOSIS — R25.1 TREMORS OF NERVOUS SYSTEM: ICD-10-CM

## 2024-03-20 DIAGNOSIS — R26.9 GAIT ABNORMALITY: ICD-10-CM

## 2024-03-20 DIAGNOSIS — R41.89 COGNITIVE IMPAIRMENT: ICD-10-CM

## 2024-03-20 DIAGNOSIS — M54.16 LUMBAR BACK PAIN WITH RADICULOPATHY AFFECTING RIGHT LOWER EXTREMITY: Primary | ICD-10-CM

## 2024-03-20 DIAGNOSIS — G25.0 BENIGN ESSENTIAL TREMOR: ICD-10-CM

## 2024-03-20 PROCEDURE — G8427 DOCREV CUR MEDS BY ELIG CLIN: HCPCS | Performed by: PSYCHIATRY & NEUROLOGY

## 2024-03-20 PROCEDURE — 1036F TOBACCO NON-USER: CPT | Performed by: PSYCHIATRY & NEUROLOGY

## 2024-03-20 PROCEDURE — 3017F COLORECTAL CA SCREEN DOC REV: CPT | Performed by: PSYCHIATRY & NEUROLOGY

## 2024-03-20 PROCEDURE — 99214 OFFICE O/P EST MOD 30 MIN: CPT | Performed by: PSYCHIATRY & NEUROLOGY

## 2024-03-20 PROCEDURE — G8484 FLU IMMUNIZE NO ADMIN: HCPCS | Performed by: PSYCHIATRY & NEUROLOGY

## 2024-03-20 PROCEDURE — G8419 CALC BMI OUT NRM PARAM NOF/U: HCPCS | Performed by: PSYCHIATRY & NEUROLOGY

## 2024-03-20 RX ORDER — ROSUVASTATIN CALCIUM 10 MG/1
10 TABLET, COATED ORAL DAILY
COMMUNITY
Start: 2022-03-28

## 2024-03-20 RX ORDER — DIMETHYL FUMARATE 240 MG/1
240 CAPSULE ORAL 2 TIMES DAILY
Qty: 60 CAPSULE | Refills: 11
Start: 2024-03-20

## 2024-03-20 NOTE — PROGRESS NOTES
Tyler Harmon is a 59 y.o. male    Chief Complaint   Patient presents with    Follow-up       BP (!) 138/90   Pulse 66   Temp 98 °F (36.7 °C)   Resp 16   Ht 1.626 m (5' 4\")   Wt 77.6 kg (171 lb)   SpO2 97%   BMI 29.35 kg/m²         1. Have you been to the ER, urgent care clinic since your last visit?  Hospitalized since your last visit? No    2. Have you seen or consulted any other health care providers outside of the [facility] since your last visit?  Include any pap smears or colon screening. No    Learning Assessment:       No data to display                Fall Risk Assessment:      4/2/2021     8:21 AM 2/26/2021     2:34 PM   Amb Fall Risk Assessment and TUG Test   Fall in past 12 months?  0   Able to walk?  Yes   Total Score 2        Abuse Screening:       No data to display                ADL Screening:       No data to display

## 2024-03-20 NOTE — PROGRESS NOTES
Consult  REFERRED BY:  Raymundo Sheets MD    CHIEF COMPLAINT: Multiple sclerosis and needs to monitor his medications which he finally got and is now on generic Tecfidera for the last 3 months.      Subjective:     Tyler Harmon is a 59 y.o. right-handed -American male we are seeing for work in basis for 3-month monitoring after he been on his new multiple sclerosis medication of Tecfidera which she has been on for 3 months now, taking 1 pill twice a day, had no side effects at all feeling good.  We have tried him on Vumerity but he never could get that approved and insurance want him to take this medication so we did this 1 and he is doing fine on it so far.  His last MRI scans were done about 3 years ago, showed no active lesions, and that is why we switched his medication from Aubagio to Tecfidera, after making a switch from Avonex to Aubagio because of injection site reactions.  Will check his metabolic parameters to make sure that he is stable.  Has a slight tremor in his hands that look like a benign essential tremor but does not bother him too much to not treating that now.  He has some neurogenic bladder symptoms and erectile dysfunction symptoms from his MS.  He has a history of prostate cancer also.  He has degenerative arthritis of his cervical and lumbar area that is not surgical and mild degenerative arthritis in his knees also.  Encouraged the patient to stay physically mentally active, exercise regular, and take his multivitamins and vitamin D every day.  Encouraged him to take his medications with food to minimize GI side effects we will check his metabolic parameters now to monitor his medication for any type of adverse reaction.  We suggest that he take another MRI scan but he said he wants to wait just a little while longer because of the cost of the study he refused to get his last 1 year ago 1 that was recommended.  His last MRI scans were done in April 2021.    Past Medical History:

## 2024-03-21 LAB
ALBUMIN SERPL-MCNC: 3.7 G/DL (ref 3.5–5)
ALBUMIN/GLOB SERPL: 1.3 (ref 1.1–2.2)
ALP SERPL-CCNC: 60 U/L (ref 45–117)
ALT SERPL-CCNC: 24 U/L (ref 12–78)
ANION GAP SERPL CALC-SCNC: 7 MMOL/L (ref 5–15)
AST SERPL-CCNC: 20 U/L (ref 15–37)
BASOPHILS # BLD: 0.1 K/UL (ref 0–0.1)
BASOPHILS NFR BLD: 1 % (ref 0–1)
BILIRUB SERPL-MCNC: 0.8 MG/DL (ref 0.2–1)
BUN SERPL-MCNC: 15 MG/DL (ref 6–20)
BUN/CREAT SERPL: 16 (ref 12–20)
CALCIUM SERPL-MCNC: 9.4 MG/DL (ref 8.5–10.1)
CHLORIDE SERPL-SCNC: 110 MMOL/L (ref 97–108)
CO2 SERPL-SCNC: 27 MMOL/L (ref 21–32)
CREAT SERPL-MCNC: 0.92 MG/DL (ref 0.7–1.3)
DIFFERENTIAL METHOD BLD: ABNORMAL
EOSINOPHIL # BLD: 0 K/UL (ref 0–0.4)
EOSINOPHIL NFR BLD: 0 % (ref 0–7)
ERYTHROCYTE [DISTWIDTH] IN BLOOD BY AUTOMATED COUNT: 13.2 % (ref 11.5–14.5)
GLOBULIN SER CALC-MCNC: 2.8 G/DL (ref 2–4)
GLUCOSE SERPL-MCNC: 101 MG/DL (ref 65–100)
HCT VFR BLD AUTO: 43.3 % (ref 36.6–50.3)
HGB BLD-MCNC: 14.7 G/DL (ref 12.1–17)
IMM GRANULOCYTES # BLD AUTO: 0 K/UL (ref 0–0.04)
IMM GRANULOCYTES NFR BLD AUTO: 1 % (ref 0–0.5)
LYMPHOCYTES # BLD: 1.6 K/UL (ref 0.8–3.5)
LYMPHOCYTES NFR BLD: 24 % (ref 12–49)
MCH RBC QN AUTO: 30.4 PG (ref 26–34)
MCHC RBC AUTO-ENTMCNC: 33.9 G/DL (ref 30–36.5)
MCV RBC AUTO: 89.6 FL (ref 80–99)
MONOCYTES # BLD: 0.8 K/UL (ref 0–1)
MONOCYTES NFR BLD: 13 % (ref 5–13)
NEUTS SEG # BLD: 4.1 K/UL (ref 1.8–8)
NEUTS SEG NFR BLD: 61 % (ref 32–75)
NRBC # BLD: 0 K/UL (ref 0–0.01)
NRBC BLD-RTO: 0 PER 100 WBC
PLATELET # BLD AUTO: 263 K/UL (ref 150–400)
PMV BLD AUTO: 10.6 FL (ref 8.9–12.9)
POTASSIUM SERPL-SCNC: 3.6 MMOL/L (ref 3.5–5.1)
PROT SERPL-MCNC: 6.5 G/DL (ref 6.4–8.2)
RBC # BLD AUTO: 4.83 M/UL (ref 4.1–5.7)
SODIUM SERPL-SCNC: 144 MMOL/L (ref 136–145)
WBC # BLD AUTO: 6.6 K/UL (ref 4.1–11.1)

## 2024-08-30 ENCOUNTER — CLINICAL DOCUMENTATION (OUTPATIENT)
Age: 59
End: 2024-08-30

## 2024-08-30 NOTE — PROGRESS NOTES
Received order shipped report from: [facility]  Drug: Teriflunomide  Shipped date: 08/23/2024

## 2024-09-16 NOTE — TELEPHONE ENCOUNTER
I called the patient back and left a message that per previous message from Dr. Robert Pepper, the patient should go ahead with the referral to vascular as he stated the swelling is not coming from his MS. More information in previous phone call.     Asked for call back if needed
Please call in regards to last message.
There are no Wet Read(s) to document.

## 2024-09-30 ENCOUNTER — OFFICE VISIT (OUTPATIENT)
Age: 59
End: 2024-09-30
Payer: MEDICARE

## 2024-09-30 VITALS
HEART RATE: 58 BPM | SYSTOLIC BLOOD PRESSURE: 138 MMHG | WEIGHT: 162 LBS | BODY MASS INDEX: 27.81 KG/M2 | OXYGEN SATURATION: 98 % | DIASTOLIC BLOOD PRESSURE: 88 MMHG

## 2024-09-30 DIAGNOSIS — R41.89 COGNITIVE IMPAIRMENT: ICD-10-CM

## 2024-09-30 DIAGNOSIS — G25.0 BENIGN ESSENTIAL TREMOR: Primary | ICD-10-CM

## 2024-09-30 DIAGNOSIS — M54.16 LUMBAR BACK PAIN WITH RADICULOPATHY AFFECTING RIGHT LOWER EXTREMITY: ICD-10-CM

## 2024-09-30 DIAGNOSIS — R25.1 TREMORS OF NERVOUS SYSTEM: ICD-10-CM

## 2024-09-30 DIAGNOSIS — G35 MULTIPLE SCLEROSIS (HCC): ICD-10-CM

## 2024-09-30 PROCEDURE — 1036F TOBACCO NON-USER: CPT | Performed by: PSYCHIATRY & NEUROLOGY

## 2024-09-30 PROCEDURE — 99214 OFFICE O/P EST MOD 30 MIN: CPT | Performed by: PSYCHIATRY & NEUROLOGY

## 2024-09-30 PROCEDURE — G8427 DOCREV CUR MEDS BY ELIG CLIN: HCPCS | Performed by: PSYCHIATRY & NEUROLOGY

## 2024-09-30 PROCEDURE — G8419 CALC BMI OUT NRM PARAM NOF/U: HCPCS | Performed by: PSYCHIATRY & NEUROLOGY

## 2024-09-30 PROCEDURE — 3017F COLORECTAL CA SCREEN DOC REV: CPT | Performed by: PSYCHIATRY & NEUROLOGY

## 2024-09-30 NOTE — PROGRESS NOTES
Consult  REFERRED BY:  Raymundo Sheets MD    CHIEF COMPLAINT: Multiple sclerosis and needs to monitor his medications of generic Tecfidera for the last 9 months.      Subjective:     Tyler Harmon is a 59 y.o. right-handed -American male we are seeing at the request of Dr. Sheets for of his MS, for which he is now on generic Tecfidera for the last 9 months, still has had no major exacerbations or side effects on the medication, and his last blood monitoring 6 months ago was all stable, he has done well there, and not had any new neurologic symptoms at all to suggest a flareup of his MS.  He has had no side effect on the medication tolerating it well want to continue the medication, we suggested that he get repeat MRI scans done again, but he refuses at this time, and says he is doing well and cannot afford it but will consider it next time.   We have tried him on Vumerity but he never could get that approved and insurance want him to take this medication so we did this 1 and he is doing fine on it so far.  His last MRI scans were done about 3 years ago, showed no active lesions, and that is why we switched his medication from Aubagio to Tecfidera, after making a switch from Avonex to Aubagio because of injection site reactions.  His blood test last time showed stable lab test and no problems from his medication and he does not want to repeat that yet today.  He says his PCP has done that.  Has a slight tremor in his hands that look like a benign essential tremor but does not bother him too much to not treating that now.  He has some neurogenic bladder symptoms and erectile dysfunction symptoms from his MS.  He has a history of prostate cancer also.  He has degenerative arthritis of his cervical and lumbar area that is not surgical and mild degenerative arthritis in his knees also.  Encouraged the patient to stay physically mentally active, exercise regular, and take his multivitamins and vitamin D every

## 2024-12-03 RX ORDER — TERIFLUNOMIDE 14 MG/1
TABLET, FILM COATED ORAL
Qty: 30 TABLET | Refills: 7 | Status: ACTIVE | OUTPATIENT
Start: 2024-12-03

## 2024-12-03 RX ORDER — TERIFLUNOMIDE 14 MG/1
14 TABLET, FILM COATED ORAL
Qty: 30 TABLET | Refills: 11 | Status: ACTIVE | OUTPATIENT
Start: 2024-12-03

## 2024-12-03 NOTE — TELEPHONE ENCOUNTER
Last office visit: 09/30/2024  Next office visit: No future visit scheduled   Last med refill: 12/07/2023 DISCONTINUED DUE TO COST

## 2024-12-26 ENCOUNTER — CLINICAL DOCUMENTATION (OUTPATIENT)
Age: 59
End: 2024-12-26

## 2025-04-04 ENCOUNTER — CLINICAL DOCUMENTATION (OUTPATIENT)
Age: 60
End: 2025-04-04

## 2025-04-22 ENCOUNTER — OFFICE VISIT (OUTPATIENT)
Age: 60
End: 2025-04-22
Payer: MEDICARE

## 2025-04-22 ENCOUNTER — HOSPITAL ENCOUNTER (OUTPATIENT)
Facility: HOSPITAL | Age: 60
Discharge: HOME | End: 2025-04-25
Payer: MEDICARE

## 2025-04-22 VITALS
SYSTOLIC BLOOD PRESSURE: 145 MMHG | HEART RATE: 61 BPM | HEIGHT: 64 IN | TEMPERATURE: 98.4 F | RESPIRATION RATE: 20 BRPM | OXYGEN SATURATION: 97 % | BODY MASS INDEX: 27.35 KG/M2 | DIASTOLIC BLOOD PRESSURE: 100 MMHG | WEIGHT: 160.2 LBS

## 2025-04-22 DIAGNOSIS — Z51.81 THERAPEUTIC DRUG MONITORING: ICD-10-CM

## 2025-04-22 DIAGNOSIS — G89.29 CHRONIC PAIN OF RIGHT KNEE: ICD-10-CM

## 2025-04-22 DIAGNOSIS — M54.16 LUMBAR BACK PAIN WITH RADICULOPATHY AFFECTING RIGHT LOWER EXTREMITY: ICD-10-CM

## 2025-04-22 DIAGNOSIS — M48.061 DEGENERATIVE LUMBAR SPINAL STENOSIS: ICD-10-CM

## 2025-04-22 DIAGNOSIS — R25.1 TREMORS OF NERVOUS SYSTEM: ICD-10-CM

## 2025-04-22 DIAGNOSIS — M25.561 CHRONIC PAIN OF RIGHT KNEE: ICD-10-CM

## 2025-04-22 DIAGNOSIS — I10 ESSENTIAL HYPERTENSION: Primary | ICD-10-CM

## 2025-04-22 DIAGNOSIS — G35 MULTIPLE SCLEROSIS (HCC): ICD-10-CM

## 2025-04-22 PROCEDURE — 72110 X-RAY EXAM L-2 SPINE 4/>VWS: CPT

## 2025-04-22 PROCEDURE — 73562 X-RAY EXAM OF KNEE 3: CPT

## 2025-04-22 PROCEDURE — 99214 OFFICE O/P EST MOD 30 MIN: CPT | Performed by: PSYCHIATRY & NEUROLOGY

## 2025-04-22 PROCEDURE — 3077F SYST BP >= 140 MM HG: CPT | Performed by: PSYCHIATRY & NEUROLOGY

## 2025-04-22 PROCEDURE — 3080F DIAST BP >= 90 MM HG: CPT | Performed by: PSYCHIATRY & NEUROLOGY

## 2025-04-22 RX ORDER — LISINOPRIL 10 MG/1
10 TABLET ORAL DAILY
Qty: 90 TABLET | Refills: 3 | Status: SHIPPED | OUTPATIENT
Start: 2025-04-22

## 2025-04-22 RX ORDER — LACTULOSE 10 G/15ML
SOLUTION ORAL
COMMUNITY
Start: 2025-03-12

## 2025-04-22 RX ORDER — OXYBUTYNIN CHLORIDE 5 MG/1
TABLET ORAL
COMMUNITY
Start: 2025-02-03

## 2025-04-22 ASSESSMENT — PATIENT HEALTH QUESTIONNAIRE - PHQ9
SUM OF ALL RESPONSES TO PHQ QUESTIONS 1-9: 0
1. LITTLE INTEREST OR PLEASURE IN DOING THINGS: NOT AT ALL
2. FEELING DOWN, DEPRESSED OR HOPELESS: NOT AT ALL
SUM OF ALL RESPONSES TO PHQ QUESTIONS 1-9: 0

## 2025-04-22 NOTE — PROGRESS NOTES
Consult  REFERRED BY:  Raymundo Sheets MD    CHIEF COMPLAINT: Patient seen for new problem of feeling that he is getting weaker all over, somewhat worse on the right side, having more right knee pain and swelling, and back pain and sciatica into his right leg, all of which are chronic problems and has been to orthopedic in the past, and he feels his MS is getting worse, and also says he cannot do jury duty and needs a letter stating that because of his moderate to severe MS and all of his degenerative arthritis.    Subjective:     Tyler Harmon is a 60 y.o. right-handed -American male we are seeing at the request of Dr. Sheets for evaluation of new problem of Patient seen for new problem of feeling that he is getting weaker all over, somewhat worse on the right side, having more right knee pain and swelling, and back pain and sciatica into his right leg, all of which are chronic problems and has been to orthopedic in the past, and he feels his MS is getting worse, and also says he cannot do jury duty and needs a letter stating that because of his moderate to severe MS and all of his degenerative arthritis.  Patient is now on generic Aubagio for the last 16 months, and his last blood monitoring 6 months ago was all stable.  For his orthopedic problems we suggest he go back to orthopedics, we will x-ray his knee for him and his back to make sure there is no new change, he needs to get back with his orthopedist or pain management doctors for all this.  We reassured him this is not part of the MS.  He has had no side effect on the medication tolerating it well want to continue the medication, we suggested that he get repeat MRI scans done again, but he refuses at this time, and says he is doing well and cannot afford it but will consider it next time.   We have tried him on Vumerity but he never could get that approved and insurance want him to take this medication so we did this 1 and he is doing fine on it

## 2025-04-23 ENCOUNTER — CLINICAL DOCUMENTATION (OUTPATIENT)
Age: 60
End: 2025-04-23

## 2025-05-21 ENCOUNTER — TELEPHONE (OUTPATIENT)
Age: 60
End: 2025-05-21

## 2025-05-21 NOTE — TELEPHONE ENCOUNTER
Earline (on [facility]) is asking that the MRI order be sent to an imaging center in [place]. Fax number [phone]

## 2025-05-23 RX ORDER — TERIFLUNOMIDE 14 MG/1
14 TABLET, FILM COATED ORAL DAILY
Qty: 30 TABLET | Refills: 11 | Status: ACTIVE | OUTPATIENT
Start: 2025-05-23

## 2025-05-23 NOTE — TELEPHONE ENCOUNTER
The patient is requesting refills on Teriflunomide 14 mg to go to the [facility] pharmacy in his chart.

## 2025-06-04 ENCOUNTER — CLINICAL DOCUMENTATION (OUTPATIENT)
Age: 60
End: 2025-06-04

## 2025-06-05 NOTE — PROGRESS NOTES
Patient cervical and brain MRI scan showed no enhancing lesions, just old white matter lesions consistent with his history of MS.

## 2025-06-10 ENCOUNTER — TELEPHONE (OUTPATIENT)
Age: 60
End: 2025-06-10

## 2025-06-10 NOTE — TELEPHONE ENCOUNTER
Patient would like to have a call back to discuss his MRI results. Pt can be reached at [phone]

## 2025-07-02 ENCOUNTER — CLINICAL DOCUMENTATION (OUTPATIENT)
Age: 60
End: 2025-07-02

## 2025-07-02 NOTE — PROGRESS NOTES
Received patient ordered shipped report shipped:   Date shipped: 06/25/2025  Medication:  teriflunomide

## 2025-07-08 DIAGNOSIS — G35 MULTIPLE SCLEROSIS (HCC): ICD-10-CM

## 2025-07-08 DIAGNOSIS — R25.1 TREMORS OF NERVOUS SYSTEM: ICD-10-CM
